# Patient Record
Sex: FEMALE | Race: WHITE | NOT HISPANIC OR LATINO | Employment: OTHER | URBAN - METROPOLITAN AREA
[De-identification: names, ages, dates, MRNs, and addresses within clinical notes are randomized per-mention and may not be internally consistent; named-entity substitution may affect disease eponyms.]

---

## 2020-01-08 ENCOUNTER — HOSPITAL ENCOUNTER (INPATIENT)
Facility: CLINIC | Age: 70
LOS: 3 days | Discharge: HOME | DRG: 438 | End: 2020-01-11
Attending: EMERGENCY MEDICINE | Admitting: INTERNAL MEDICINE
Payer: COMMERCIAL

## 2020-01-08 ENCOUNTER — APPOINTMENT (OUTPATIENT)
Dept: ULTRASOUND IMAGING | Facility: CLINIC | Age: 70
DRG: 438 | End: 2020-01-08
Attending: INTERNAL MEDICINE
Payer: COMMERCIAL

## 2020-01-08 DIAGNOSIS — F10.10 ALCOHOL ABUSE: ICD-10-CM

## 2020-01-08 DIAGNOSIS — R82.4 KETONURIA: ICD-10-CM

## 2020-01-08 DIAGNOSIS — F10.931 ALCOHOL WITHDRAWAL SYNDROME, WITH DELIRIUM (H): ICD-10-CM

## 2020-01-08 DIAGNOSIS — K85.20 ALCOHOL-INDUCED ACUTE PANCREATITIS, UNSPECIFIED COMPLICATION STATUS: ICD-10-CM

## 2020-01-08 DIAGNOSIS — E87.20 LACTIC ACIDOSIS: ICD-10-CM

## 2020-01-08 DIAGNOSIS — E87.29 HIGH ANION GAP METABOLIC ACIDOSIS: Primary | ICD-10-CM

## 2020-01-08 PROBLEM — F10.939 ALCOHOL WITHDRAWAL (H): Status: ACTIVE | Noted: 2020-01-08

## 2020-01-08 LAB
ALBUMIN SERPL-MCNC: 4.1 G/DL (ref 3.4–5)
ALBUMIN UR-MCNC: 50 MG/DL
ALP SERPL-CCNC: 90 U/L (ref 40–150)
ALT SERPL W P-5'-P-CCNC: 47 U/L (ref 0–50)
ANION GAP SERPL CALCULATED.3IONS-SCNC: 26 MMOL/L (ref 3–14)
APPEARANCE UR: CLEAR
AST SERPL W P-5'-P-CCNC: 103 U/L (ref 0–45)
BACTERIA #/AREA URNS HPF: ABNORMAL /HPF
BASOPHILS # BLD AUTO: 0.1 10E9/L (ref 0–0.2)
BASOPHILS NFR BLD AUTO: 0.8 %
BILIRUB SERPL-MCNC: 1.1 MG/DL (ref 0.2–1.3)
BILIRUB UR QL STRIP: NEGATIVE
BUN SERPL-MCNC: 9 MG/DL (ref 7–30)
CALCIUM SERPL-MCNC: 10.1 MG/DL (ref 8.5–10.1)
CHLORIDE SERPL-SCNC: 93 MMOL/L (ref 94–109)
CO2 SERPL-SCNC: 13 MMOL/L (ref 20–32)
COLOR UR AUTO: YELLOW
CREAT SERPL-MCNC: 0.63 MG/DL (ref 0.52–1.04)
DIFFERENTIAL METHOD BLD: ABNORMAL
EOSINOPHIL # BLD AUTO: 0 10E9/L (ref 0–0.7)
EOSINOPHIL NFR BLD AUTO: 0 %
ERYTHROCYTE [DISTWIDTH] IN BLOOD BY AUTOMATED COUNT: 12.6 % (ref 10–15)
ETHANOL SERPL-MCNC: <0.01 G/DL
GFR SERPL CREATININE-BSD FRML MDRD: >90 ML/MIN/{1.73_M2}
GLUCOSE SERPL-MCNC: 90 MG/DL (ref 70–99)
GLUCOSE UR STRIP-MCNC: NEGATIVE MG/DL
HCT VFR BLD AUTO: 44.2 % (ref 35–47)
HGB BLD-MCNC: 15.5 G/DL (ref 11.7–15.7)
HGB UR QL STRIP: NEGATIVE
HYALINE CASTS #/AREA URNS LPF: 4 /LPF (ref 0–2)
IMM GRANULOCYTES # BLD: 0.1 10E9/L (ref 0–0.4)
IMM GRANULOCYTES NFR BLD: 0.9 %
KETONES UR STRIP-MCNC: >150 MG/DL
LACTATE BLD-SCNC: 2.1 MMOL/L (ref 0.7–2)
LACTATE BLD-SCNC: 2.7 MMOL/L (ref 0.7–2)
LEUKOCYTE ESTERASE UR QL STRIP: NEGATIVE
LIPASE SERPL-CCNC: 1048 U/L (ref 73–393)
LYMPHOCYTES # BLD AUTO: 1 10E9/L (ref 0.8–5.3)
LYMPHOCYTES NFR BLD AUTO: 10.9 %
MAGNESIUM SERPL-MCNC: 1.7 MG/DL (ref 1.6–2.3)
MCH RBC QN AUTO: 35.7 PG (ref 26.5–33)
MCHC RBC AUTO-ENTMCNC: 35.1 G/DL (ref 31.5–36.5)
MCV RBC AUTO: 102 FL (ref 78–100)
MONOCYTES # BLD AUTO: 0.7 10E9/L (ref 0–1.3)
MONOCYTES NFR BLD AUTO: 8.1 %
MUCOUS THREADS #/AREA URNS LPF: PRESENT /LPF
NEUTROPHILS # BLD AUTO: 7.2 10E9/L (ref 1.6–8.3)
NEUTROPHILS NFR BLD AUTO: 79.3 %
NITRATE UR QL: NEGATIVE
NRBC # BLD AUTO: 0 10*3/UL
NRBC BLD AUTO-RTO: 0 /100
PH UR STRIP: 5.5 PH (ref 5–7)
PLATELET # BLD AUTO: 240 10E9/L (ref 150–450)
POTASSIUM SERPL-SCNC: 3.6 MMOL/L (ref 3.4–5.3)
PROT SERPL-MCNC: 8.2 G/DL (ref 6.8–8.8)
RBC # BLD AUTO: 4.34 10E12/L (ref 3.8–5.2)
RBC #/AREA URNS AUTO: <1 /HPF (ref 0–2)
SODIUM SERPL-SCNC: 132 MMOL/L (ref 133–144)
SOURCE: ABNORMAL
SP GR UR STRIP: 1.02 (ref 1–1.03)
SQUAMOUS #/AREA URNS AUTO: 4 /HPF (ref 0–1)
TROPONIN I SERPL-MCNC: <0.015 UG/L (ref 0–0.04)
UROBILINOGEN UR STRIP-MCNC: NORMAL MG/DL (ref 0–2)
WBC # BLD AUTO: 9.1 10E9/L (ref 4–11)
WBC #/AREA URNS AUTO: 1 /HPF (ref 0–5)

## 2020-01-08 PROCEDURE — 85025 COMPLETE CBC W/AUTO DIFF WBC: CPT | Performed by: EMERGENCY MEDICINE

## 2020-01-08 PROCEDURE — 96375 TX/PRO/DX INJ NEW DRUG ADDON: CPT

## 2020-01-08 PROCEDURE — 96361 HYDRATE IV INFUSION ADD-ON: CPT

## 2020-01-08 PROCEDURE — 25000125 ZZHC RX 250: Performed by: INTERNAL MEDICINE

## 2020-01-08 PROCEDURE — 99223 1ST HOSP IP/OBS HIGH 75: CPT | Mod: AI | Performed by: INTERNAL MEDICINE

## 2020-01-08 PROCEDURE — 76705 ECHO EXAM OF ABDOMEN: CPT

## 2020-01-08 PROCEDURE — 80320 DRUG SCREEN QUANTALCOHOLS: CPT | Performed by: EMERGENCY MEDICINE

## 2020-01-08 PROCEDURE — 83735 ASSAY OF MAGNESIUM: CPT | Performed by: EMERGENCY MEDICINE

## 2020-01-08 PROCEDURE — 96374 THER/PROPH/DIAG INJ IV PUSH: CPT

## 2020-01-08 PROCEDURE — 99285 EMERGENCY DEPT VISIT HI MDM: CPT | Mod: 25

## 2020-01-08 PROCEDURE — 25800030 ZZH RX IP 258 OP 636: Performed by: INTERNAL MEDICINE

## 2020-01-08 PROCEDURE — 25000132 ZZH RX MED GY IP 250 OP 250 PS 637: Performed by: INTERNAL MEDICINE

## 2020-01-08 PROCEDURE — 12000000 ZZH R&B MED SURG/OB

## 2020-01-08 PROCEDURE — 25800030 ZZH RX IP 258 OP 636: Performed by: EMERGENCY MEDICINE

## 2020-01-08 PROCEDURE — 25000128 H RX IP 250 OP 636: Performed by: EMERGENCY MEDICINE

## 2020-01-08 PROCEDURE — 83690 ASSAY OF LIPASE: CPT | Performed by: EMERGENCY MEDICINE

## 2020-01-08 PROCEDURE — 93005 ELECTROCARDIOGRAM TRACING: CPT

## 2020-01-08 PROCEDURE — 80053 COMPREHEN METABOLIC PANEL: CPT | Performed by: EMERGENCY MEDICINE

## 2020-01-08 PROCEDURE — 83605 ASSAY OF LACTIC ACID: CPT | Performed by: EMERGENCY MEDICINE

## 2020-01-08 PROCEDURE — C9113 INJ PANTOPRAZOLE SODIUM, VIA: HCPCS | Performed by: EMERGENCY MEDICINE

## 2020-01-08 PROCEDURE — 81001 URINALYSIS AUTO W/SCOPE: CPT | Performed by: EMERGENCY MEDICINE

## 2020-01-08 PROCEDURE — 25000128 H RX IP 250 OP 636: Performed by: INTERNAL MEDICINE

## 2020-01-08 PROCEDURE — 84484 ASSAY OF TROPONIN QUANT: CPT | Performed by: EMERGENCY MEDICINE

## 2020-01-08 RX ORDER — ACETAMINOPHEN 325 MG/1
650 TABLET ORAL EVERY 4 HOURS PRN
Status: DISCONTINUED | OUTPATIENT
Start: 2020-01-08 | End: 2020-01-11 | Stop reason: HOSPADM

## 2020-01-08 RX ORDER — ATENOLOL 50 MG/1
50 TABLET ORAL DAILY
COMMUNITY
End: 2022-12-04

## 2020-01-08 RX ORDER — ATENOLOL 50 MG/1
50 TABLET ORAL DAILY
Status: DISCONTINUED | OUTPATIENT
Start: 2020-01-08 | End: 2020-01-11 | Stop reason: HOSPADM

## 2020-01-08 RX ORDER — LORAZEPAM 2 MG/ML
1-2 INJECTION INTRAMUSCULAR EVERY 30 MIN PRN
Status: DISCONTINUED | OUTPATIENT
Start: 2020-01-08 | End: 2020-01-11 | Stop reason: HOSPADM

## 2020-01-08 RX ORDER — POLYETHYLENE GLYCOL 3350 17 G/17G
17 POWDER, FOR SOLUTION ORAL DAILY PRN
Status: DISCONTINUED | OUTPATIENT
Start: 2020-01-08 | End: 2020-01-11 | Stop reason: HOSPADM

## 2020-01-08 RX ORDER — LANOLIN ALCOHOL/MO/W.PET/CERES
100 CREAM (GRAM) TOPICAL DAILY
Status: DISCONTINUED | OUTPATIENT
Start: 2020-01-09 | End: 2020-01-11 | Stop reason: HOSPADM

## 2020-01-08 RX ORDER — LISINOPRIL 30 MG/1
30 TABLET ORAL DAILY
COMMUNITY
End: 2021-05-07

## 2020-01-08 RX ORDER — ONDANSETRON 2 MG/ML
4 INJECTION INTRAMUSCULAR; INTRAVENOUS ONCE
Status: COMPLETED | OUTPATIENT
Start: 2020-01-08 | End: 2020-01-08

## 2020-01-08 RX ORDER — FOLIC ACID 1 MG/1
1 TABLET ORAL DAILY
Status: DISCONTINUED | OUTPATIENT
Start: 2020-01-09 | End: 2020-01-11 | Stop reason: HOSPADM

## 2020-01-08 RX ORDER — LORAZEPAM 1 MG/1
1-2 TABLET ORAL EVERY 30 MIN PRN
Status: DISCONTINUED | OUTPATIENT
Start: 2020-01-08 | End: 2020-01-11 | Stop reason: HOSPADM

## 2020-01-08 RX ORDER — LIDOCAINE 40 MG/G
CREAM TOPICAL
Status: DISCONTINUED | OUTPATIENT
Start: 2020-01-08 | End: 2020-01-11 | Stop reason: HOSPADM

## 2020-01-08 RX ORDER — SODIUM CHLORIDE, SODIUM LACTATE, POTASSIUM CHLORIDE, CALCIUM CHLORIDE 600; 310; 30; 20 MG/100ML; MG/100ML; MG/100ML; MG/100ML
INJECTION, SOLUTION INTRAVENOUS CONTINUOUS
Status: DISCONTINUED | OUTPATIENT
Start: 2020-01-08 | End: 2020-01-10

## 2020-01-08 RX ORDER — ACETAMINOPHEN 325 MG/1
325-650 TABLET ORAL EVERY 8 HOURS PRN
COMMUNITY
End: 2024-04-27

## 2020-01-08 RX ORDER — MULTIPLE VITAMINS W/ MINERALS TAB 9MG-400MCG
1 TAB ORAL DAILY
Status: DISCONTINUED | OUTPATIENT
Start: 2020-01-09 | End: 2020-01-11 | Stop reason: HOSPADM

## 2020-01-08 RX ORDER — CALCIUM CARBONATE 500(1250)
1 TABLET ORAL DAILY
COMMUNITY
End: 2024-04-27

## 2020-01-08 RX ADMIN — SODIUM CHLORIDE 1000 ML: 9 INJECTION, SOLUTION INTRAVENOUS at 17:04

## 2020-01-08 RX ADMIN — LISINOPRIL 30 MG: 20 TABLET ORAL at 19:41

## 2020-01-08 RX ADMIN — SODIUM CHLORIDE 1000 ML: 9 INJECTION, SOLUTION INTRAVENOUS at 15:30

## 2020-01-08 RX ADMIN — PANTOPRAZOLE SODIUM 40 MG: 40 INJECTION, POWDER, FOR SOLUTION INTRAVENOUS at 15:58

## 2020-01-08 RX ADMIN — FOLIC ACID: 5 INJECTION, SOLUTION INTRAMUSCULAR; INTRAVENOUS; SUBCUTANEOUS at 19:46

## 2020-01-08 RX ADMIN — ONDANSETRON HYDROCHLORIDE 4 MG: 2 INJECTION, SOLUTION INTRAMUSCULAR; INTRAVENOUS at 15:30

## 2020-01-08 RX ADMIN — ATENOLOL 50 MG: 50 TABLET ORAL at 19:41

## 2020-01-08 ASSESSMENT — ENCOUNTER SYMPTOMS
ACTIVITY CHANGE: 1
VOMITING: 1
SHORTNESS OF BREATH: 0
BLOOD IN STOOL: 0
APPETITE CHANGE: 1
FEVER: 0
WEAKNESS: 1
CHILLS: 0
FATIGUE: 1
COUGH: 0
DIARRHEA: 1
ABDOMINAL PAIN: 0

## 2020-01-08 ASSESSMENT — ACTIVITIES OF DAILY LIVING (ADL): ADLS_ACUITY_SCORE: 11

## 2020-01-08 ASSESSMENT — MIFFLIN-ST. JEOR: SCORE: 1090.88

## 2020-01-08 NOTE — ED NOTES
[facility]  ED Nurse Handoff Report    Rizwana Aguilar is a 69 year old female   ED Chief complaint: Fall and Generalized Weakness  . ED Diagnosis:   Final diagnoses:   None     Allergies: No Known Allergies    Code Status: Full Code  Activity level - Baseline/Home:  Independent. Activity Level - Current:   Stand by Assist. Lift room needed: No. Bariatric: No   Needed: No   Isolation: No. Infection: Not Applicable.     Vital Signs:   Vitals:    01/08/20 1609 01/08/20 1615 01/08/20 1630 01/08/20 1645   BP:  (!) 165/95 (!) 156/105 (!) 169/117   Pulse:  116 115 144   Resp:       Temp:       TempSrc:       SpO2: 99% 100% 100% 94%   Weight:       Height:           Cardiac Rhythm:  ,   Cardiac  Cardiac Rhythm: Other (Comment)(ST with PVCs)  Pain level: 0-10 Pain Scale: 0  Patient confused: No. Patient Falls Risk: Yes.   Elimination Status: Has voided   Patient Report - Initial Complaint: Generalized weakness. Focused Assessment: Respiratory- Lung sounds diminished. Cardiac- Tele showing ST with occ. PVCs.  Neuro- A&O. GI- Nausea, vomiting and diarrhea over last few days. Bowel sounds active x 4. Musculoskeletal- Generalized weakness. MHCD- Daily drinker for 40+ years. Admits to a couple a day. Last drink yesterday. Drinks wine, daniel and vodka.   Tests Performed: Labs, EKG. Abnormal Results:   Labs Ordered and Resulted from Time of ED Arrival Up to the Time of Departure from the ED   CBC WITH PLATELETS DIFFERENTIAL - Abnormal; Notable for the following components:       Result Value     (*)     MCH 35.7 (*)     All other components within normal limits   COMPREHENSIVE METABOLIC PANEL - Abnormal; Notable for the following components:    Sodium 132 (*)     Chloride 93 (*)     Carbon Dioxide 13 (*)     Anion Gap 26 (*)      (*)     All other components within normal limits   LIPASE - Abnormal; Notable for the following components:    Lipase 1,048 (*)     All other components within normal  limits   LACTIC ACID WHOLE BLOOD - Abnormal; Notable for the following components:    Lactic Acid 2.7 (*)     All other components within normal limits   ROUTINE UA WITH MICROSCOPIC - Abnormal; Notable for the following components:    Ketones Urine >150 (*)     Protein Albumin Urine 50 (*)     Bacteria Urine Few (*)     Squamous Epithelial /HPF Urine 4 (*)     Mucous Urine Present (*)     Hyaline Casts 4 (*)     All other components within normal limits   TROPONIN I   MAGNESIUM   ALCOHOL ETHYL   LACTIC ACID   PERIPHERAL IV CATHETER   CARDIAC CONTINUOUS MONITORING   PULSE OXIMETRY NURSING   ORTHOSTATIC BLOOD PRESSURE AND PULSE   .   Treatments provided: 2 L NS, 4 mg Zofran, 40 mg protonix  Family Comments:  Blas at bedside  OBS brochure/video discussed/provided to patient:  N/A  ED Medications:   Medications   0.9% sodium chloride BOLUS (1,000 mLs Intravenous New Bag 1/8/20 1704)   0.9% sodium chloride BOLUS (0 mLs Intravenous Stopped 1/8/20 1704)   ondansetron (ZOFRAN) injection 4 mg (4 mg Intravenous Given 1/8/20 1530)   pantoprazole (PROTONIX) 40 mg IV push injection (40 mg Intravenous Given 1/8/20 1558)     Drips infusing:  Yes  For the majority of the shift, the patient's behavior Green. Interventions performed were N/A.     Severe Sepsis OR Septic Shock Diagnosis Present: No      ED Nurse Name/Phone Number: Mickie Lopez RN,   5:40 PM    RECEIVING UNIT ED HANDOFF REVIEW    Above ED Nurse Handoff Report was reviewed: Yes  Reviewed by: Olivier Echavarria RN on January 8, 2020 at 5:56 PM

## 2020-01-08 NOTE — ED PROVIDER NOTES
"  History     Chief Complaint:  Fall and Generalized Weakness    The history is provided by the patient and the spouse.      Rizwana Aguilar is a 69 year old female with a history of HTN who presents from [facility] for evaluation of generalized weakness and a fall. She has had fatigue and weakness for a least a couple months, worsening over the last 4 days. This has resulted in her sitting in her chair most of the time. She has associated yellow, non-bloody emesis with about 4-5 episodes in the last few days and mild diarrhea. Last night she was walking up the stairs and \"collapsed\" but this was witnessed by her  and she did not have loss of consciousness or head injury. He had to assist her up and into bed though she was able to get up and dressed independently today. She visited primary care who recommended ER visit. She denies injuries related to this fall and denies prodromal chest pain or shortness of breath.    The patient's  reports Rizwana complains of feeling ill for 4 years, complaining of \"the flu for a year.\" She saw a GI about 1.5 years ago and was diagnosed with a gastric ulcer. She was instructed to take omeprazole for her symptoms, but has not been complaint with this medication. She has discontinued use of NSAIDs, but continues to use alcohol daily. She admits to 2 drinks, but her  reports it is more than this. She denies abdominal pain, fever, cough, chills, or black or bloody stools. She has had anorexia, consuming only a piece of toast, a cocktail, and water yesterday.    Allergies:  NKDA     Medications:    Atenolol  Lisinopril      Past Medical History:    Gastric ulcer  UTI    Past Surgical History:    Oophorectomy    Family History:    No past pertinent family history.     Social History:  The patient was accompanied to the ED by her .  Alcohol Use: Positive, 2-3 drinks daily  Smoking Status: Negative   Marital Status:       Review of Systems " "  Constitutional: Positive for activity change, appetite change and fatigue. Negative for chills and fever.   Respiratory: Negative for cough and shortness of breath.    Cardiovascular: Negative for chest pain.   Gastrointestinal: Positive for diarrhea and vomiting. Negative for abdominal pain and blood in stool.   Neurological: Positive for weakness (generalized). Negative for syncope.   All other systems reviewed and are negative.    Physical Exam     Patient Vitals for the past 24 hrs:   BP Temp Temp src Pulse Heart Rate Resp SpO2 Height Weight   01/08/20 1929 (!) 189/118 98.5  F (36.9  C) Oral 142 -- 20 100 % -- --   01/08/20 1741 (!) 188/113 -- -- 134 144 -- 99 % -- --   01/08/20 1645 (!) 169/117 -- -- 144 149 -- 94 % -- --   01/08/20 1630 (!) 156/105 -- -- 115 116 -- 100 % -- --   01/08/20 1615 (!) 165/95 -- -- 116 105 -- 100 % -- --   01/08/20 1609 -- -- -- -- 110 -- 99 % -- --   01/08/20 1600 (!) 154/99 -- -- 101 112 -- -- -- --   01/08/20 1530 (!) 173/119 -- -- 122 131 -- 100 % -- --   01/08/20 1507 (!) 167/115 97.5  F (36.4  C) Oral -- 118 20 100 % 1.651 m (5' 5\") 56 kg (123 lb 7.3 oz)        Lying Orthostatic BP: 166/106  Pulse: 118 bpm  Sitting Orthostatic BP: 173/91  Pulse: 124 bpm  Standing Orthostatic BP: 169/117  Pulse: 140 bpm    Physical Exam  General: Well-developed and well-nourished. Well appearing elderly  woman. Cooperative.  Head:  Atraumatic.  Eyes:  Conjunctivae, lids, and sclerae are normal.  ENT:    Normal nose. Moist mucous membranes.  Neck:  Supple. Normal range of motion.  CV:  Tachycardic rate and regular rhythm. Normal heart sounds with no murmurs, rubs, or gallops detected.  Resp:  No respiratory distress. Clear to auscultation bilaterally without decreased breath sounds, wheezing, rales, or rhonchi.  GI:  Soft. Non-distended. Non-tender.    MS:  Normal ROM. No bilateral lower extremity edema.  Skin:  Warm. Non-diaphoretic. No pallor.  Neuro:  Awake. A&Ox3. Normal strength. " No tremor or tongue fasciculations.  Psych: Normal mood and affect. Normal speech.  Vitals reviewed.    Emergency Department Course   EKG  Indication: fall, weakness  Time: 1524  Rate 127 bpm. AK interval 124. QRS duration 66. QT/QTc 346/502.   Sinus tachycardia with premature supraventricular complexes  Nonspecific ST abnormality   Abnormal QRS-T angle, consider primary T wave abnormality  Abnormal ECG   No acute ST changes.  No EKG for comparison.    Laboratory:  CBC: WBC: 9.1, HGB 15.5, PLT: 240    CMP: Sodium 132 (L), Chloride 93 (L), Carbon Dioxide 13 (L), Anion Gap 26 (H),  (H), o/w WNL (Creatinine: 0.63)    1526 Troponin I: <0.015    Lipase: 1048 (H)    Magnesium: 1.7    52272 Lactic acid whole blood: 2.7 (H)    1526 Alcohol ethyl: <0.01    UA: Ketones >150, Protein Albumin 50, Bacteria Few, Squamous Epithelial 4 (H), Mucous Present, Hyalin Casts 4 (H), o/w WNL     Interventions:  1530 NS 1000 mL IV Bolus  1530 Zofran 4 mg IV  1558 Protonix 40 mg IV  1704 NS 1000 mL IV Bolus    Emergency Department Course:   Past medical records, nursing notes, and vitals reviewed.  1525: I performed an exam of the patient and obtained history, as documented above.    EKG obtained in the ED, see results above.      IV was inserted and blood was drawn for laboratory testing, results above.     The patient provided a urine sample here in the emergency department. This was sent for laboratory testing, findings above.     Medication and IV fluids administered.     1620 I rechecked and updated the patient.     1645 I spoke with Dr. Dalton, hospitalist, who agreed to admit the patient.     Findings and plan explained to the patient who consents to admission. Discussed the patient with Dr. Dalton, who will admit the patient to a Med/Surg bed for further monitoring, evaluation, and treatment.    I personally reviewed the laboratory results with the patient and answered all related questions prior to admission.     1826 I  rechecked and updated the patient. The patient reports she is doing well.     Impression & Plan    CMS Diagnoses: The Lactic acid level is elevated due to dehydration, acidosis, ketosis, at this time there is no sign of severe sepsis or septic shock. and None    Medical Decision Making:  Rizwana is a 69-year-old female who drinks daily and tells me she is felt unwell for the last 4 days with generalized weakness and vomiting.  She did have a fall yesterday which she insists was mechanical though she needed assistance to get up and get to bed.  Her  finally convinced her to see primary care who sent her to the emergency department.  She has no injuries from the fall and generally appears well.  She has no abdominal tenderness to warrant imaging of the abdomen.  EKG is reassuring without acute ST changes or arrhythmias and troponin is undetectable.  I doubt a cardiac etiology for her symptoms today and she did not have syncope yesterday.  Alcohol level is undetectable.  She does have tachycardia and hypertension though she does not have other signs of alcohol withdrawal including no diaphoresis, tremors, or tongue fasciculations and benzodiazepines are not indicated in the emergency department.  Her laboratory work-up is significant for pancreatitis with a lipase of 1,048 which is almost certainly related to alcohol.  Again, imaging of the abdomen can safely be deferred to an inpatient basis if indicated without pain or tenderness.  Electrolytes are generally reassuring though she does have evidence of anion gap acidosis with a bicarb of 13.  Concurrently she has a lactic acidosis to 2.7 and greater than 150 urine ketones.  I suspect this is related to degree of dehydration in addition to starvation ketosis and possibly a degree of alcoholic ketosis.  However, there is no evidence of infection including no leukocytosis or urinary tract infection and antibiotics are not indicated.  After 2 L of IV fluids, her  lactic acid improved to 2.1.  She was given Protonix and Zofran and had no further vomiting.  Otherwise she required no interventions while under my care but this patient does require admission for alcoholic pancreatitis with anion gap acidosis, lactic acidosis, and ketosis.  Orthostatics were negative for BP, but nearly positive for heart rate which increased 22 bpm with standing. I discussed the findings and this plan with the patient.  She is hesitant to be admitted as she prefers not to seek medical care, but ultimately acquiesced.  I answered all her questions.  I discussed patient case with Dr. Dalton, hospitalist, who accepts admission and has no further orders.    Diagnosis:    ICD-10-CM    1. High anion gap metabolic acidosis E87.2    2. Ketonuria R82.4    3. Lactic acidosis E87.2    4. Alcohol-induced acute pancreatitis, unspecified complication status K85.20    5. Alcohol abuse F10.10        Disposition:  Admitted to Med/Surg under the care of Dr. Dalton    I, Tamara Burger, am serving as a scribe on 1/8/2020 at 3:09 PM to personally document services performed by Annamaria Jack MD based on my observations and the provider's statements to me.      Tamara Burger  1/8/2020   [facility] EMERGENCY DEPARTMENT       Annamaria Jack MD  01/08/20 [id number]

## 2020-01-08 NOTE — H&P
[facility]    History and Physical - Hospitalist Service       Date of Admission:  1/8/2020     Assessment & Plan   Rizwana Aguilar is a 69 year old female with a history of possible liver cirrhosis, HTN, gastric ulcer, cancer status post lumpectomy and radiation who presented to the ED today for evaluation of weakness.    Constellation of patient's findings seem all related to alcohol use.  She has low p.o. intake, vomiting, generalized weakness.  She was found to have a metabolic acidosis with a bicarbonate of 13 related to starvation ketosis and lactic acidosis.  She has physiologic findings consistent with alcohol withdrawal with tachycardia and hypertension.  Admit inpatient status, IV fluids with LR at 150 cc/hour overnight, CIWA protocol with symptom triggered lorazepam, IV Protonix for gastritis without hematemesis.    Alcohol abuse, alcohol dependence, and alcohol withdrawal  - Patient with daily alcohol use admitted with signs of alcohol withdrawal, with tachycardia and hypertension.  - Patient denies history of complicated withdrawal but has never had a long period of sobriety.  - CIWA scale with symptom triggered lorazepam.  - Supplement thiamine and folate.  - Offered chemical dependency consult but patient declined.  thought she would benefit. Recommend re-addressing in the AM.    Alcoholic pancreatitis  - Lipase is elevated at 1000 on admission. No abdominal pain.   - Elevation in AST is related to alcohol. No sign of gall stone pancreatitis.  - As patient is pain-free on admission will allow clears tonight.    Vomiting  - Most likely related to alcohol induced gastritis.  Patient does have a history of LA grade C reflux esophagitis and nonbleeding gastric ulcers, though this was in the setting of daily NSAID use and follow-up EGD demonstrated healing. Hb 15 on admission is likely to be much lower tomorrow after IV fluids.  - Patient adamantly denies recent NSAID use.  - Continue IV  "Protonix 40 mg BID.  Don't see an indication for endoscopy at this time.    Anion gap metabolic acidosis   - Bicarbonate 13 admission with anion gap of 26.  - Secondary to starvation ketoacidosis and lactic acidosis.  Patient has ketones positive on urinalysis and history of low p.o. intake, indicating starvation ketosis  - Lactic acidosis is also elevated at 2.7, this is mostly related to vomiting and alcohol use with liver dysfunction and not representative of any hypoperfusion.  Will trend x1.  - Continuous IV fluids, recheck BMP in the AM.    Possible liver cirrhosis  - She has seen gastroenterology in the outpatient setting in 2018, note says \"likely cirrhosis.\"  Etiology most likely due to alcohol but she did have elevated ferritin, though TIBC was normal at 284, so not likely from hemochromatosis. Previous viral hepatitis testing negative. ASMA tested positive at 1:80 but on repeat was negative.   - No varices have been seen but patient noted to have changes consistent with portal hypertensive gastropathy on previous EGD.  - No known history of HE, though  reports recent decline in cognitive function.   Ascites not evident on exam.  - Will obtain RUQ to look at liver parenchyma. Don't think she needs lactulose at this time. Can likely follow up with GI in the outpatient setting.     Mild hyponatremia  - Na 132 on admission, likely hypovolemic hyponatremia with the report of nausea/vomiting.  - Repeat Na in the AM after IVF overnight.    HTN  - Hypertensive on admission. Home medications are atenolol 50 mg daily and lisinopril 30 mg. Continue the same.      Diet: Clear liquid  DVT Prophylaxis: SCDs  Billings Catheter: No  Code Status: DNR/DNI    Disposition Plan   Expected discharge: 2-3 days. Anticipate discharge to home.   Yovani Dalton MD  [facility]    ______________________________________________________________________    Chief Complaint   Weakness    History of Present " "Illness   Rizwana Aguilar is a 69 year old female with a history of possible liver cirrhosis, HTN, gastric ulcer, cancer status post lumpectomy and radiation who presented to the ED today for evaluation of weakness.    History is obtained primarily from the patient's .  He reports that the patient has been drinking heavily for years.  He states she drinks multiple strong mixed drinks, a glass of wine and daniel on a daily basis.  She has had issues with chronic nausea and flulike symptoms for years.   reports that over the last few days the patient has not had any appetite and not been able to keep any food down.  She has been vomiting a few times.  She has been feeling extremely weak and had a mechanical fall going up the stairs.  For these reasons, he brought the patient to her internal medicine clinic today, and she was subsequently referred to the ED.  In the ED, the patient was found to be hypertensive and tachycardic.  Lab work-up was notable for negative ethanol, hyponatremia at 132, elevated lipase greater than 1000, lactic acid of 2.7, bicarbonate of 13 with positive ketones on urinalysis.  She was given IV fluids, Zofran and Protonix.    On interview, the patient denies having abdominal pain.  She admits to the vomiting as above, though minimized the amount. No blood has been seen in the emesis. Denies chest pain or shortness of breath. Denies any recent NSAID use.  notes that there has also been concern about the patient's cognition as of late and plans are being made for outpatient neurocognitive evaluation.     Review of Systems    The 10 point Review of Systems is negative other than noted in the HPI or here.     Physical Exam   BP (!) 154/99   Pulse 101   Temp 97.5  F (36.4  C) (Oral)   Resp 20   Ht 1.651 m (5' 5\")   Wt 56 kg (123 lb 7.3 oz)   SpO2 99%   Breastfeeding No   BMI 20.54 kg/m         General: No distress, laying in the bed.     HEENT: No scleral icterus. Oropharynx " moist.     Neck: Supple. Normal range of motion.     Pulmonary: Normal work of breathing. Clear to auscultation bilaterally.    Cardiovascular: Regular rate and rhythm without murmur or extra heart sounds.    Abdomen: Soft and non-tender. No sign of fluid wave.     Extremities: No peripheral edema. No clubbing or cyanosis.     Neurologic: Awake, alert, appropriate. No sign of alteration in mentation.     Skin: Warm and dry. No jaundice. No spider angiomata seen. No palmar erythema.     Psychiatric: Normal affect and mood.     Data   Data reviewed today: I reviewed all medications, new labs and imaging results over the last 24 hours.     Recent Labs   Lab 01/08/20  1526   WBC 9.1   HGB 15.5   *      *   POTASSIUM 3.6   CHLORIDE 93*   CO2 13*   BUN 9   CR 0.63   ANIONGAP 26*   KHUSHI 10.1   GLC 90   ALBUMIN 4.1   PROTTOTAL 8.2   BILITOTAL 1.1   ALKPHOS 90   ALT 47   *   LIPASE 1,048*   TROPI <0.015     No results found for this or any previous visit (from the past 24 hour(s)).    Past Medical History    I have reviewed this patient's medical history and updated it with pertinent information if needed.   Past Medical History:   Diagnosis Date     Hypertension         Past Surgical History    I have reviewed this patient's surgical history and updated it with pertinent information if needed.  Past Surgical History:   Procedure Laterality Date     GYN SURGERY          Social History    I have reviewed this patient's social history and updated it with pertinent information if needed.  Daily drinker as outlined.  No tobacco or drug use.        Family History    I have reviewed this patient's family history and updated it with pertinent information if needed.   Father had Alzheimer's dementia.     Prior to Admission Medications    Prior to Admission Medications   Prescriptions Last Dose Informant Patient Reported? Taking?   ATENOLOL PO 1/7/2020 at Unknown time  Yes Yes   LISINOPRIL PO 1/7/2020 at  Unknown time  Yes Yes      Facility-Administered Medications: None        Allergies    No Known Allergies

## 2020-01-08 NOTE — ED TRIAGE NOTES
Pt sent to ED from [facility].  She has hx of ulcer and drinks alcohol daily, admits to 2-3 daily.  Her last drink was yesterday..  She is feeling generalized weakness and fell yesterday.

## 2020-01-09 LAB
ALBUMIN SERPL-MCNC: 2.8 G/DL (ref 3.4–5)
ALP SERPL-CCNC: 61 U/L (ref 40–150)
ALT SERPL W P-5'-P-CCNC: 28 U/L (ref 0–50)
ANION GAP SERPL CALCULATED.3IONS-SCNC: 20 MMOL/L (ref 3–14)
AST SERPL W P-5'-P-CCNC: 60 U/L (ref 0–45)
BILIRUB SERPL-MCNC: 1 MG/DL (ref 0.2–1.3)
BUN SERPL-MCNC: 8 MG/DL (ref 7–30)
CALCIUM SERPL-MCNC: 8.4 MG/DL (ref 8.5–10.1)
CHLORIDE SERPL-SCNC: 108 MMOL/L (ref 94–109)
CO2 SERPL-SCNC: 10 MMOL/L (ref 20–32)
CREAT SERPL-MCNC: 0.58 MG/DL (ref 0.52–1.04)
ERYTHROCYTE [DISTWIDTH] IN BLOOD BY AUTOMATED COUNT: 13 % (ref 10–15)
GFR SERPL CREATININE-BSD FRML MDRD: >90 ML/MIN/{1.73_M2}
GLUCOSE SERPL-MCNC: 81 MG/DL (ref 70–99)
HCT VFR BLD AUTO: 35.1 % (ref 35–47)
HGB BLD-MCNC: 11.9 G/DL (ref 11.7–15.7)
INTERPRETATION ECG - MUSE: NORMAL
MAGNESIUM SERPL-MCNC: 1.5 MG/DL (ref 1.6–2.3)
MAGNESIUM SERPL-MCNC: 3 MG/DL (ref 1.6–2.3)
MCH RBC QN AUTO: 34.5 PG (ref 26.5–33)
MCHC RBC AUTO-ENTMCNC: 33.9 G/DL (ref 31.5–36.5)
MCV RBC AUTO: 102 FL (ref 78–100)
PHOSPHATE SERPL-MCNC: 1.5 MG/DL (ref 2.5–4.5)
PHOSPHATE SERPL-MCNC: 1.7 MG/DL (ref 2.5–4.5)
PLATELET # BLD AUTO: 175 10E9/L (ref 150–450)
POTASSIUM SERPL-SCNC: 3.2 MMOL/L (ref 3.4–5.3)
POTASSIUM SERPL-SCNC: 3.4 MMOL/L (ref 3.4–5.3)
PROT SERPL-MCNC: 5.6 G/DL (ref 6.8–8.8)
RBC # BLD AUTO: 3.45 10E12/L (ref 3.8–5.2)
SODIUM SERPL-SCNC: 138 MMOL/L (ref 133–144)
WBC # BLD AUTO: 7.4 10E9/L (ref 4–11)

## 2020-01-09 PROCEDURE — 36415 COLL VENOUS BLD VENIPUNCTURE: CPT | Performed by: INTERNAL MEDICINE

## 2020-01-09 PROCEDURE — 12000000 ZZH R&B MED SURG/OB

## 2020-01-09 PROCEDURE — 25000128 H RX IP 250 OP 636: Performed by: INTERNAL MEDICINE

## 2020-01-09 PROCEDURE — 25800030 ZZH RX IP 258 OP 636: Performed by: INTERNAL MEDICINE

## 2020-01-09 PROCEDURE — 83735 ASSAY OF MAGNESIUM: CPT | Performed by: INTERNAL MEDICINE

## 2020-01-09 PROCEDURE — 84132 ASSAY OF SERUM POTASSIUM: CPT | Performed by: INTERNAL MEDICINE

## 2020-01-09 PROCEDURE — 85027 COMPLETE CBC AUTOMATED: CPT | Performed by: INTERNAL MEDICINE

## 2020-01-09 PROCEDURE — C9113 INJ PANTOPRAZOLE SODIUM, VIA: HCPCS | Performed by: INTERNAL MEDICINE

## 2020-01-09 PROCEDURE — 84100 ASSAY OF PHOSPHORUS: CPT | Performed by: INTERNAL MEDICINE

## 2020-01-09 PROCEDURE — 25000125 ZZHC RX 250: Performed by: INTERNAL MEDICINE

## 2020-01-09 PROCEDURE — 99232 SBSQ HOSP IP/OBS MODERATE 35: CPT | Performed by: INTERNAL MEDICINE

## 2020-01-09 PROCEDURE — 80053 COMPREHEN METABOLIC PANEL: CPT | Performed by: INTERNAL MEDICINE

## 2020-01-09 PROCEDURE — 25000132 ZZH RX MED GY IP 250 OP 250 PS 637: Performed by: INTERNAL MEDICINE

## 2020-01-09 RX ORDER — ONDANSETRON 2 MG/ML
4 INJECTION INTRAMUSCULAR; INTRAVENOUS EVERY 6 HOURS PRN
Status: DISCONTINUED | OUTPATIENT
Start: 2020-01-09 | End: 2020-01-11 | Stop reason: HOSPADM

## 2020-01-09 RX ORDER — POTASSIUM CHLORIDE 1.5 G/1.58G
20-40 POWDER, FOR SOLUTION ORAL
Status: DISCONTINUED | OUTPATIENT
Start: 2020-01-09 | End: 2020-01-11 | Stop reason: HOSPADM

## 2020-01-09 RX ORDER — POTASSIUM CL/LIDO/0.9 % NACL 10MEQ/0.1L
10 INTRAVENOUS SOLUTION, PIGGYBACK (ML) INTRAVENOUS
Status: DISCONTINUED | OUTPATIENT
Start: 2020-01-09 | End: 2020-01-11 | Stop reason: HOSPADM

## 2020-01-09 RX ORDER — POTASSIUM CHLORIDE 1500 MG/1
20-40 TABLET, EXTENDED RELEASE ORAL
Status: DISCONTINUED | OUTPATIENT
Start: 2020-01-09 | End: 2020-01-11 | Stop reason: HOSPADM

## 2020-01-09 RX ORDER — PROCHLORPERAZINE MALEATE 5 MG
5 TABLET ORAL EVERY 6 HOURS PRN
Status: DISCONTINUED | OUTPATIENT
Start: 2020-01-09 | End: 2020-01-11 | Stop reason: HOSPADM

## 2020-01-09 RX ORDER — MAGNESIUM SULFATE HEPTAHYDRATE 40 MG/ML
4 INJECTION, SOLUTION INTRAVENOUS EVERY 4 HOURS PRN
Status: DISCONTINUED | OUTPATIENT
Start: 2020-01-09 | End: 2020-01-11 | Stop reason: HOSPADM

## 2020-01-09 RX ORDER — POTASSIUM CHLORIDE 29.8 MG/ML
20 INJECTION INTRAVENOUS
Status: DISCONTINUED | OUTPATIENT
Start: 2020-01-09 | End: 2020-01-11 | Stop reason: HOSPADM

## 2020-01-09 RX ORDER — ONDANSETRON 4 MG/1
4 TABLET, ORALLY DISINTEGRATING ORAL EVERY 6 HOURS PRN
Status: DISCONTINUED | OUTPATIENT
Start: 2020-01-09 | End: 2020-01-11 | Stop reason: HOSPADM

## 2020-01-09 RX ORDER — PROCHLORPERAZINE 25 MG
12.5 SUPPOSITORY, RECTAL RECTAL EVERY 12 HOURS PRN
Status: DISCONTINUED | OUTPATIENT
Start: 2020-01-09 | End: 2020-01-11 | Stop reason: HOSPADM

## 2020-01-09 RX ORDER — POTASSIUM CHLORIDE 7.45 MG/ML
10 INJECTION INTRAVENOUS
Status: DISCONTINUED | OUTPATIENT
Start: 2020-01-09 | End: 2020-01-11 | Stop reason: HOSPADM

## 2020-01-09 RX ADMIN — Medication 10 MG: at 00:09

## 2020-01-09 RX ADMIN — SODIUM CHLORIDE, POTASSIUM CHLORIDE, SODIUM LACTATE AND CALCIUM CHLORIDE: 600; 310; 30; 20 INJECTION, SOLUTION INTRAVENOUS at 19:44

## 2020-01-09 RX ADMIN — MAGNESIUM SULFATE HEPTAHYDRATE 4 G: 40 INJECTION, SOLUTION INTRAVENOUS at 11:51

## 2020-01-09 RX ADMIN — POTASSIUM CHLORIDE 40 MEQ: 1.5 POWDER, FOR SOLUTION ORAL at 10:54

## 2020-01-09 RX ADMIN — LISINOPRIL 30 MG: 20 TABLET ORAL at 09:23

## 2020-01-09 RX ADMIN — POTASSIUM PHOSPHATE, MONOBASIC AND POTASSIUM PHOSPHATE, DIBASIC 20 MMOL: 224; 236 INJECTION, SOLUTION INTRAVENOUS at 14:00

## 2020-01-09 RX ADMIN — SODIUM CHLORIDE, POTASSIUM CHLORIDE, SODIUM LACTATE AND CALCIUM CHLORIDE: 600; 310; 30; 20 INJECTION, SOLUTION INTRAVENOUS at 06:01

## 2020-01-09 RX ADMIN — Medication 100 MG: at 09:21

## 2020-01-09 RX ADMIN — PANTOPRAZOLE SODIUM 40 MG: 40 INJECTION, POWDER, FOR SOLUTION INTRAVENOUS at 10:32

## 2020-01-09 RX ADMIN — ONDANSETRON 4 MG: 4 TABLET, ORALLY DISINTEGRATING ORAL at 14:25

## 2020-01-09 RX ADMIN — ENOXAPARIN SODIUM 40 MG: 40 INJECTION SUBCUTANEOUS at 09:24

## 2020-01-09 RX ADMIN — FOLIC ACID 1 MG: 1 TABLET ORAL at 09:22

## 2020-01-09 RX ADMIN — MULTIPLE VITAMINS W/ MINERALS TAB 1 TABLET: TAB at 09:22

## 2020-01-09 RX ADMIN — ATENOLOL 50 MG: 50 TABLET ORAL at 09:22

## 2020-01-09 RX ADMIN — Medication 10 MG: at 19:44

## 2020-01-09 ASSESSMENT — ACTIVITIES OF DAILY LIVING (ADL)
ADLS_ACUITY_SCORE: 14
ADLS_ACUITY_SCORE: 15
ADLS_ACUITY_SCORE: 15
ADLS_ACUITY_SCORE: 14
ADLS_ACUITY_SCORE: 15
ADLS_ACUITY_SCORE: 15

## 2020-01-09 NOTE — PLAN OF CARE
VSS. Afebrile. LS diminished. BS x4. Pt denies pain and nausea. Clear liquid diet, tolerating well. SBA to ambulate. PIV - LR @ 150 ml/hr. CIWA score of 3 and 1. A&O but forgetful; Alarm on bed for safety. Tele monitoring. Slept well. Discharge TBD.

## 2020-01-09 NOTE — PLAN OF CARE
DNR/DNI.  Tolerating clear liquids.  Up with stand by assist.  VSS on room air. Afebrile.  Banana bag @ 100.  Tele: SR w/ PACs.  Abd US completed. Results in chart.  BM yesterday.  CIWA: 3.  Alert and oriented, forgetful.  Disposition pending.

## 2020-01-09 NOTE — CONSULTS
"CLINICAL NUTRITION SERVICES  -  ASSESSMENT NOTE      Future/Additional Recommendations: High protein supplements    Malnutrition: % Weight Loss:  > 7.5% in 3 months (severe malnutrition)  % Intake:  </= 50% for >/= 5 days (severe malnutrition)  Subcutaneous Fat Loss:  Orbital region moderate depletion and Upper arm region severe depletion  Muscle Loss:  Temporal region mild to moderate depletion, Clavicle bone region moderate depletion, Acromion bone region mostly rouded, Dorsal hand region moderate depletion, Patellar region moderate depletion, Anterior thigh region moderate to severe depletion and Posterior calf region moderate to severe depletion with sagging skin.   Fluid Retention:  None noted    Malnutrition Diagnosis: Severe malnutrition  In Context of:  Acute illness or injury and social and environmental factors      REASON FOR ASSESSMENT  Rizwana Aguilar is a 69 year old female seen by Registered Dietitian for Provider Order - Malnutrition   Hiistory of possible liver cirrhosis, HTN, gastric ulcer, cancer status post lumpectomy and radiation    NUTRITION HISTORY  Information obtained from patient. Questionable accuracy of diet history with conflicting information reported.   Pt states that nutrition at home is \"fantastic\" and her  is the primary cook. She reports that she eats three meals a day with no additional snacks. She has no previous diet restrictions and no food allergies. General meals include toast with peanut butter for breakfast, 1/2 sandwich (BLT) and soup for lunch, and meat and a salad for dinner. Pt reports that she has had no intake for 4 days but hasn't been feeling well for week. Pt reports poor appetite and vomiting being a barrier to eating. She has been consuming ensure 1Xday for the last 3 months. Consumes only water throughout the day and did not discuss ETOH consumption.     CURRENT NUTRITION ORDERS  Diet Order:     Clear Liquid, ADAT    Current Intake/Tolerance:  Pt is only " "tolerating clear liquids at the moment. States her stomach hurts when she eats.    NUTRITION FOCUSED PHYSICAL ASSESSMENT FOR DIAGNOSING MALNUTRITION)  Yes  Observed:    Muscle wasting (refer to documentation in Malnutrition section) and Subcutaneous fat loss (refer to documentation in Malnutrition section)    Obtained from Chart/Interdisciplinary Team:  Generalized weakness, assist, CIWA, and confused.     ANTHROPOMETRICS  Height: 5' 5\"  Weight: 123 lbs 7.32 oz  Body mass index is 20.54 kg/m .  Weight Status:  Normal BMI  IBW: 125 lbs   % IBW: 98%  Weight History: No recent weight history to review. Per pt usual body weight 140 lbs (63.6 kg). 12% loss in 3 months.     LABS  Labs reviewed  Lipase 1,048 (H) pancreatitis   Potassium 3.2 (L) vomiting   Phosphorus 1.5 (L)  Magnesium 1.5 (L)  Positive urinary ketones   Urinary function WNL     MEDICATIONS  Medications reviewed  Folic Acid 1 mg  Thiamine 100 mg   MTV/M  IV fluids @150 mL/hr    ASSESSED NUTRITION NEEDS PER APPROVED PRACTICE GUIDELINES:    Dosing Weight 56 kg   Estimated Energy Needs: 7952-6226 kcals (25-30+ Kcal/Kg) 1096 MSJ X 1.3 (1425)  Justification: maintenance  Estimated Protein Needs: 56-67 grams protein (1-1.2+ g pro/Kg)   Justification: maintenance  Estimated Fluid Needs: >/=1 mL/Kcal  Justification: maintenance    MALNUTRITION:  % Weight Loss:  > 7.5% in 3 months (severe malnutrition)  % Intake:  </= 50% for >/= 5 days (severe malnutrition)  Subcutaneous Fat Loss: Orbital region moderate depletion and Upper arm region severe depletion  Muscle Loss:  Temporal region mild to moderate depletion, Clavicle bone region moderate depletion, Acromion bone region mostly rounded, Dorsal hand region moderate depletion, Patellar region moderate depletion, Anterior thigh region moderate to severe depletion and Posterior calf region moderate to severe depletion with sagging skin.   Fluid Retention:  None noted    Malnutrition Diagnosis: Severe malnutrition  In " Context of:  Acute illness or injury and social and environmental factors     NUTRITION DIAGNOSIS:  Inadequate energy intake related to excessive alcohol use, pancreatitis, vomiting and abdominal pain with eating as evidenced by >7.5% weight loss in 3 months, fat and muscle loss, and consuming only 50% of needs for 1 week PTA.     NUTRITION INTERVENTIONS  Recommendations / Nutrition Prescription  High protein shakes once diet advances. Diet advancement per MD. Continue micronutrients as ordered. Electrolyte replacement per MD.   .  Implementation  Nutrition education: Provided education on protein sources including boost. Also introduced the idea of a low salt diet and pt declined stating she consumes a low salt diet at home.   Medical Food Supplement- boost at 10 and 2 with diet advancement. Collaboration and Referral of Nutrition care- discussed during IDT rounds and discussed with RN.   .  Nutrition Goals  Patient to consume boost shakes TID.    Diet advancement and tolerance >/= full liquids within 48 hours  .  MONITORING AND EVALUATION:  Progress towards goals will be monitored and evaluated per protocol and Practice Guidelines    Alaina Velez  Dietetic Intern

## 2020-01-09 NOTE — PLAN OF CARE
Intermittent nausea and received zofran x1, CIWA scores 0/2, denies pain, up with Ax1 and holds on to IV pole, unsteady, started on clear liquids and appetite is poor, electrolytes all needing to be replaced today per protocol, had to slow potassium phosphate infusion d/t vein irritation but site has good blood return and no swelling or redness, declined and treatment and is not planning on quitting drinking per MD, nutrition following

## 2020-01-09 NOTE — PHARMACY-ADMISSION MEDICATION HISTORY
Admission medication history interview status for this patient is complete. See [facility] admission navigator for allergy information, prior to admission medications and immunization status.     Medication history interview source(s):Patient  Medication history resources (including written lists, pill bottles, clinic record): SureScripts and Care Everywhere  Primary pharmacy: [address], [place] [address]    Changes made to PTA medication list:  Added: acetaminophen and calcim  Deleted: none  Changed: added dosage regimen for lisinopril and atenolol    Actions taken by pharmacist (provider contacted, etc):None     Additional medication history information:None    Medication reconciliation/reorder completed by provider prior to medication history?  No     Do you take OTC medications (eg tylenol, ibuprofen, fish oil, eye/ear drops, etc)? Yes - see list.     Prior to Admission medications    Medication Sig Last Dose Taking? Auth Provider   acetaminophen (TYLENOL) 325 MG tablet Take 325-650 mg by mouth every 8 hours as needed for mild pain Past Month at Unknown time Yes Unknown, Entered By History   atenolol (TENORMIN) 50 MG tablet Take 50 mg by mouth daily  1/7/2020 at am Yes Reported, Patient   calcium carbonate (OS-KHUSHI) 500 MG tablet Take 1 tablet by mouth daily Past Week at Unknown time Yes Unknown, Entered By History   lisinopril (PRINIVIL/ZESTRIL) 30 MG tablet Take 30 mg by mouth daily  1/7/2020 at am Yes Reported, Patient

## 2020-01-09 NOTE — PROGRESS NOTES
"[facility]  Hospitalist Progress Note  Scarlet Damico MD 01/09/20  Text Page  Pager: [phone] (7am-6pm)    Reason for Stay (Diagnosis): weakness, alcohol dependence, pancreatitis         Assessment and Plan:      Summary of Stay: Rizwana Aguilar is a 69 year old female with past medical history of possible cirrhosis, hypertension, prior gastric ulcer (in the setting of significant NSAID use), breast cancer status post lumpectomy and radiation who was admitted on 1/8/2020 with diffuse weakness at the urging of her  to come to the hospital with associated poor oral intake and vomiting.  Patient was found to have anion gap metabolic acidosis and likely pancreatitis.    Problem List/Assessment and Plan:     Alcohol abuse, alcohol dependence, at risk for alcohol withdrawal: Patient drinks alcohol daily.  She minimizes the amount of alcohol she drinks and does not feel that she has a problem with alcohol.  Discussed the importance of alcohol cessation but patient reports that this is \"part of my routine\" and she is not interested in quitting.  Declines chemical dependency consultation.  At this time monitoring for withdrawal but she has not required treatment.  -CIWA protocol with vitamins  -Offered CD consult but patient declines this     Alcoholic pancreatitis: Presented with poor oral intake, nausea, vomiting for several days prior to admission.  She had no abdominal pain on admission.  She was found to have a lipase of 1000 as well as mild elevation of AST.  As above she drinks on a daily basis.  This morning she has mild mid abdominal pain and nausea but no emesis so far.  She has tolerated ice chips so far today but has no appetite.  This is certainly a mild case of pancreatitis but will monitor her closely as diet is advanced.  -Clear liquid diet, advance as tolerated  -Antiemetics as needed     Vomiting: This could be due to pancreatitis as above.  She also has history of LA grade C reflux " "esophagitis and non bleeding gastric ulcers, though this was in the setting of daily NSAID use and follow-up EGD demonstrated healing.  Certainly could have alcoholic gastritis.  Patient adamantly denies recent NSAID use.  -PPI  -PRN antiemetics     Anion gap metabolic acidosis: Presented with a bicarbonate of 13 and an anion gap of 26.  Patient also has ketones positive on UA and a history of poor oral intake for several days as well as significant alcohol use.  Her lactate was elevated to 2.7 on admission and improved to 2.1 so far with fluids.  This is likely a combination of starvation ketoacidosis, lactic acidosis as well as related to her significant alcohol use.  Her bicarbonate remains low at 10 this morning but anion gap is improved slightly to 20.  She still has not eaten and will continue IV fluids and try to advance diet.  -Continue LR at 150 cc/h  -BMP in the morning     Possible liver cirrhosis: She has seen gastroenterology in the outpatient setting in 2018, note says \"likely cirrhosis.\"  Etiology most likely due to alcohol but she did have elevated ferritin, though TIBC was normal at 284, so not likely from hemochromatosis. Previous viral hepatitis testing negative. ASMA tested positive at 1:80 but on repeat was negative. No varices have been seen but patient noted to have changes consistent with portal hypertensive gastropathy on previous EGD. No known history of HE, though  reports recent decline in cognitive function.  Right upper quadrant ultrasound showed no ascites.  -Follow-up in the outpatient setting with GI  -Certainly needs alcohol cessation    Mild hyponatremia -resolved: Na 132 on admission, hypovolemic hyponatremia with the report of nausea/vomiting as well as poor oral intake.  Sodium normalized with hydration.     HTN: Hypertensive on admission. Home medications are atenolol 50 mg daily and lisinopril 30 mg. Continue the same.    Diffuse weakness: Due to combination of " "metabolic abnormalities, poor oral intake and alcohol use.  Treatment as above.  PT has been consulted.    Malnutrition: Nutrition consulted.    Mild cognitive impairment: Patient seems to have very poor insight into her medical disease.  She has a hard time giving me an accurate history.  The patient's  has noticed that her cognition has seemed to be declining over the past several months and reportedly has plans to have neuropsych testing in the outpatient setting.     Diet: Advance Diet as Tolerated: Clear Liquid Diet    DVT Prophylaxis: Enoxaparin (Lovenox) SQ  Billings Catheter: not present  Code Status: DNR/DNI      Disposition Plan   Expected discharge: 1-3 days, recommended to TBD based on therapy recommendations once safe disposition plan/ TCU bed available and improved lab results, tolerating diet.  Entered: Scarlet Damico MD 01/09/2020, 8:55 AM       The patient's care was discussed with the Bedside Nurse, Patient and Patient's Family.    Hospitalist Service  [facility]          Interval History (Subjective):      Patient reports she feels absolutely fine today.  Notes she would not have come to the hospital if her  didn't make her.  She stated there was nothing wrong but with specific questioning she has had nausea for the past 3 days with some emesis.  She does have some mid abdominal pain which is mild.  States she does not want to try anything other than water.  Reports she has not eaten in 3 days.  She drinks a few drinks and a glass of wine daily but states this is not very much.  Declines meeting with CD.  States she sees no reason to quit drinking as it is \"part of my routine\".  She states she has cut back from what she used to drink.    I left a message with her  this morning.                    Physical Exam:      Last Vital Signs:  BP (P) 115/66 (BP Location: Right arm)   Pulse 142   Temp (P) 98.3  F (36.8  C) (Oral)   Resp (P) 20   Ht 1.651 m (5' 5\")   Wt " 56 kg (123 lb 7.3 oz)   SpO2 (P) 100%   Breastfeeding No   BMI 20.54 kg/m      General: Alert, awake, no acute distress.  HEENT: Normocephalic and atraumatic, eyes anicteric and without scleral injection, EOMI, face symmetric, MMM.  Cardiac: RRR, normal S1, S2. No m/g/r, no LE edema.  Pulmonary: Normal chest rise, normal work of breathing.  Lungs CTAB without crackles or wheezing.  Abdomen: soft, non-tender, non-distended.  Normoactive bowel sounds, no guarding or rebound tenderness.  Extremities: no deformities.  Warm, well perfused.  Skin: no rashes or lesions.  Warm and Dry.  Neuro: No focal deficits.  Speech clear.  Coordination and strength grossly normal.  Psych: Alert and oriented x3, very poor insight into her disease.  Cannot give accurate description of what was occurring PTA. Appropriate affect.         Medications:      All current medications were reviewed with changes reflected in problem list.         Data:      All new lab and imaging data was reviewed.   Labs:  Recent Labs   Lab 01/08/20  1526   WBC 9.1   HGB 15.5   HCT 44.2   *        Recent Labs   Lab 01/09/20  0729 01/08/20  1526    132*   POTASSIUM 3.2* 3.6   CHLORIDE 108 93*   CO2 10* 13*   ANIONGAP 20* 26*   GLC 81 90   BUN 8 9   CR 0.58 0.63   GFRESTIMATED >90 >90   GFRESTBLACK >90 >90   KHUSHI 8.4* 10.1   MAG  --  1.7   PROTTOTAL 5.6* 8.2   ALBUMIN 2.8* 4.1   BILITOTAL 1.0 1.1   ALKPHOS 61 90   AST 60* 103*   ALT 28 47     Recent Labs   Lab 01/08/20  1526   LIPASE 1,048*      Imaging:   Recent Results (from the past 24 hour(s))   US Abdomen Limited    Narrative    US ABDOMEN LIMITED   1/8/2020 7:26 PM     HISTORY: Evaluate for cirrhosis.    COMPARISON: None.    FINDINGS:    Gallbladder: Normal with no cholelithiasis, wall thickening or focal  tenderness.      Bile ducts: CHD is normal diameter. No intrahepatic biliary  dilatation.    Liver: Liver length is 14.7 cm. Increased echogenicity of the liver  compared to the  right kidney.    Pancreas: Partially obscured by overlying bowel gas,  but grossly  unremarkable.     Right kidney: Normal.     Aorta and IVC: Not specifically assessed.       Impression    IMPRESSION: Increased echogenicity of the liver compared to the right  kidney could relate to fatty infiltration or underlying liver disease.  No acute abnormality is seen.    MD Scarlet BENSON MD.

## 2020-01-10 ENCOUNTER — APPOINTMENT (OUTPATIENT)
Dept: PHYSICAL THERAPY | Facility: CLINIC | Age: 70
DRG: 438 | End: 2020-01-10
Attending: INTERNAL MEDICINE
Payer: COMMERCIAL

## 2020-01-10 LAB
ALBUMIN SERPL-MCNC: 2.4 G/DL (ref 3.4–5)
ALP SERPL-CCNC: 58 U/L (ref 40–150)
ALT SERPL W P-5'-P-CCNC: 23 U/L (ref 0–50)
ANION GAP SERPL CALCULATED.3IONS-SCNC: 11 MMOL/L (ref 3–14)
AST SERPL W P-5'-P-CCNC: 43 U/L (ref 0–45)
BILIRUB SERPL-MCNC: 0.6 MG/DL (ref 0.2–1.3)
BUN SERPL-MCNC: 3 MG/DL (ref 7–30)
CALCIUM SERPL-MCNC: 8.9 MG/DL (ref 8.5–10.1)
CHLORIDE SERPL-SCNC: 106 MMOL/L (ref 94–109)
CO2 SERPL-SCNC: 20 MMOL/L (ref 20–32)
CREAT SERPL-MCNC: 0.42 MG/DL (ref 0.52–1.04)
ERYTHROCYTE [DISTWIDTH] IN BLOOD BY AUTOMATED COUNT: 12.9 % (ref 10–15)
GFR SERPL CREATININE-BSD FRML MDRD: >90 ML/MIN/{1.73_M2}
GLUCOSE SERPL-MCNC: 92 MG/DL (ref 70–99)
HCT VFR BLD AUTO: 34.5 % (ref 35–47)
HGB BLD-MCNC: 12.2 G/DL (ref 11.7–15.7)
LACTATE BLD-SCNC: 0.9 MMOL/L (ref 0.7–2)
MAGNESIUM SERPL-MCNC: 2 MG/DL (ref 1.6–2.3)
MCH RBC QN AUTO: 34.9 PG (ref 26.5–33)
MCHC RBC AUTO-ENTMCNC: 35.4 G/DL (ref 31.5–36.5)
MCV RBC AUTO: 99 FL (ref 78–100)
PHOSPHATE SERPL-MCNC: 1.3 MG/DL (ref 2.5–4.5)
PHOSPHATE SERPL-MCNC: 1.9 MG/DL (ref 2.5–4.5)
PLATELET # BLD AUTO: 162 10E9/L (ref 150–450)
POTASSIUM SERPL-SCNC: 3.2 MMOL/L (ref 3.4–5.3)
POTASSIUM SERPL-SCNC: 3.5 MMOL/L (ref 3.4–5.3)
POTASSIUM SERPL-SCNC: 3.8 MMOL/L (ref 3.4–5.3)
PROT SERPL-MCNC: 5.1 G/DL (ref 6.8–8.8)
RBC # BLD AUTO: 3.5 10E12/L (ref 3.8–5.2)
SODIUM SERPL-SCNC: 137 MMOL/L (ref 133–144)
WBC # BLD AUTO: 6.3 10E9/L (ref 4–11)

## 2020-01-10 PROCEDURE — 25800030 ZZH RX IP 258 OP 636: Performed by: INTERNAL MEDICINE

## 2020-01-10 PROCEDURE — 25000128 H RX IP 250 OP 636: Performed by: INTERNAL MEDICINE

## 2020-01-10 PROCEDURE — 99233 SBSQ HOSP IP/OBS HIGH 50: CPT | Performed by: INTERNAL MEDICINE

## 2020-01-10 PROCEDURE — 84100 ASSAY OF PHOSPHORUS: CPT | Performed by: INTERNAL MEDICINE

## 2020-01-10 PROCEDURE — 25000125 ZZHC RX 250: Performed by: INTERNAL MEDICINE

## 2020-01-10 PROCEDURE — 97116 GAIT TRAINING THERAPY: CPT | Mod: GP

## 2020-01-10 PROCEDURE — 25000132 ZZH RX MED GY IP 250 OP 250 PS 637: Performed by: INTERNAL MEDICINE

## 2020-01-10 PROCEDURE — 97530 THERAPEUTIC ACTIVITIES: CPT | Mod: GP

## 2020-01-10 PROCEDURE — 84132 ASSAY OF SERUM POTASSIUM: CPT | Performed by: INTERNAL MEDICINE

## 2020-01-10 PROCEDURE — 83605 ASSAY OF LACTIC ACID: CPT | Performed by: INTERNAL MEDICINE

## 2020-01-10 PROCEDURE — 85027 COMPLETE CBC AUTOMATED: CPT | Performed by: INTERNAL MEDICINE

## 2020-01-10 PROCEDURE — 80053 COMPREHEN METABOLIC PANEL: CPT | Performed by: INTERNAL MEDICINE

## 2020-01-10 PROCEDURE — 36415 COLL VENOUS BLD VENIPUNCTURE: CPT | Performed by: INTERNAL MEDICINE

## 2020-01-10 PROCEDURE — 12000000 ZZH R&B MED SURG/OB

## 2020-01-10 PROCEDURE — 97161 PT EVAL LOW COMPLEX 20 MIN: CPT | Mod: GP

## 2020-01-10 PROCEDURE — 83735 ASSAY OF MAGNESIUM: CPT | Performed by: INTERNAL MEDICINE

## 2020-01-10 RX ADMIN — POTASSIUM CHLORIDE 40 MEQ: 1.5 POWDER, FOR SOLUTION ORAL at 09:15

## 2020-01-10 RX ADMIN — LISINOPRIL 30 MG: 20 TABLET ORAL at 09:17

## 2020-01-10 RX ADMIN — MULTIPLE VITAMINS W/ MINERALS TAB 1 TABLET: TAB at 09:18

## 2020-01-10 RX ADMIN — POTASSIUM PHOSPHATE, MONOBASIC AND POTASSIUM PHOSPHATE, DIBASIC 20 MMOL: 224; 236 INJECTION, SOLUTION INTRAVENOUS at 10:15

## 2020-01-10 RX ADMIN — OMEPRAZOLE 40 MG: 20 CAPSULE, DELAYED RELEASE ORAL at 07:02

## 2020-01-10 RX ADMIN — POTASSIUM PHOSPHATE, MONOBASIC AND POTASSIUM PHOSPHATE, DIBASIC 20 MMOL: 224; 236 INJECTION, SOLUTION INTRAVENOUS at 20:14

## 2020-01-10 RX ADMIN — SODIUM CHLORIDE, POTASSIUM CHLORIDE, SODIUM LACTATE AND CALCIUM CHLORIDE: 600; 310; 30; 20 INJECTION, SOLUTION INTRAVENOUS at 01:56

## 2020-01-10 RX ADMIN — Medication 10 MG: at 20:38

## 2020-01-10 RX ADMIN — ENOXAPARIN SODIUM 40 MG: 40 INJECTION SUBCUTANEOUS at 09:17

## 2020-01-10 RX ADMIN — SODIUM CHLORIDE, POTASSIUM CHLORIDE, SODIUM LACTATE AND CALCIUM CHLORIDE: 600; 310; 30; 20 INJECTION, SOLUTION INTRAVENOUS at 09:02

## 2020-01-10 RX ADMIN — FOLIC ACID 1 MG: 1 TABLET ORAL at 09:18

## 2020-01-10 RX ADMIN — Medication 100 MG: at 09:18

## 2020-01-10 RX ADMIN — ATENOLOL 50 MG: 50 TABLET ORAL at 09:17

## 2020-01-10 ASSESSMENT — ACTIVITIES OF DAILY LIVING (ADL)
ADLS_ACUITY_SCORE: 18
ADLS_ACUITY_SCORE: 15
ADLS_ACUITY_SCORE: 15
ADLS_ACUITY_SCORE: 14
ADLS_ACUITY_SCORE: 15
ADLS_ACUITY_SCORE: 14

## 2020-01-10 NOTE — PLAN OF CARE
"  PT: Patient seen by physical therapy for evaluation and treatment.  Patient with PMH including cirrhosis, HTN, prior gastric ulcer, breast cancer s/p lumpectomy and radiation now admitted with diffuse weakness, poor oral intake and vomiting with gap metabolic acidosis and likely pancreatitis.  Patient lives with her spouse in multi level house (6 steps to enter, 2 steps to living room, 1 step to kitchen, 1 step to dining room).  Spouse is retired and home during the day.  Patient does not use an assistive device at baseline.  Patient and spouse report significant decrease in balance and ambulation in the week or two leading up to admission.  Patient reports falling x2 (\"caught toe on step\" and \"legs just gave out on me\").  Spouse reports that he is unable to assist her to standing, patient crawled to bed and was able to pull herself onto bed with spouse providing significant assistance.      Discharge Planner PT   Patient plan for discharge: Patient reports that she wants to leave for home as soon as possible.  Current status: Patient supine upon arrival.  Patient is independent with bed mobility.  Patient transferred to standing with CGA.  Patient amb 100 feet without assistive device with CGA.  Patient with slow gait speed, wide base of support, intermittently reaching out for railings/walls.  Patient experienced loss of balance with head turns while walking (able to self correct) and declined retro walking \"I can't walk backwards!\"  Patient demonstrated loss of balance with tandem stance (foot placed in front of the other) and single leg stance.  Patient amb 150 feet with 2WW and SBA.  Patient with improved gait speed and narrowed base of support.  Patient able to tolerate mild balance perturbations (turning corners, turning head) without loss of balance.  Recommended to patient and spouse use of walker with all mobility, placement of grab bars within house at single steps to assist with balance, and Home PT " "for ongoing gait and balance training as well as home safety evaluation (for placement of grab bars).   in agreement, patient polite but silent by end of session.  Immediately following session,  came out of room stating that patient wanted to leave hospital \"Right now.\"  RN staff aware.  Barriers to return to prior living situation: high fall risk, stairs, decreased balance, decreased safety awareness, limited insight into deficits  Recommendations for discharge: Home with CGA from spouse on stairs, use of walker with all mobility, home PT for home safety evaluation.  Rationale for recommendations: Patient presents below baseline for functional mobility.  Patient with balance deficits resulting in high fall risk.  Patient with poor safety awareness and limited insight into deficits.  Patient would benefit from home PT to address balance and gait deficits and to also perform home safety evaluation.       Entered by: Vivien Carlin 01/10/2020 11:41 AM       "

## 2020-01-10 NOTE — PROGRESS NOTES
01/10/20 1051   Quick Adds   Type of Visit Initial PT Evaluation   Living Environment   Lives With spouse   Living Arrangements house   Home Accessibility stairs to enter home;stairs within home   Number of Stairs, Main Entrance 6   Stair Railings, Main Entrance railing on right side (ascending)   Number of Stairs, Within Home, Primary 2   Stair Railings, Within Home, Primary railing on right side (ascending)   Living Environment Comment Patient has multiple levels within home with multiple single steps to access kitchen, dining room, living room.  There is a rail to enter living room, other steps in house do not have rails.  Walk in shower, no grab bars, no shower chair.   is retired and is at home most of the day.   Self-Care   Usual Activity Tolerance fair   Current Activity Tolerance poor   Regular Exercise No   Equipment Currently Used at Home cane, straight   Activity/Exercise/Self-Care Comment Spouse reports decreased balance and independence a few days prior to coming to the hospital   Functional Level Prior   Ambulation 0-->independent   Transferring 0-->independent   Toileting 0-->independent   Bathing 0-->independent   Fall history within last six months yes   Number of times patient has fallen within last six months 2   Which of the above functional risks had a recent onset or change? ambulation;transferring   Prior Functional Level Comment Patient reports prior to a wekk before admission, patient was independent with mobility, good balance.  Within a couple of days prior to admission, patient with decreased independence with poor balance.  Patient sustained 2 falls, spouse unable to lift patient up.  Patient crawled to bed, spouse provided max A to enter bed.     General Information   Onset of Illness/Injury or Date of Surgery - Date 01/08/20   Referring Physician Scarlet Damico MD   Patient/Family Goals Statement return to home, safety with walking   Pertinent History of Current Problem  (include personal factors and/or comorbidities that impact the POC) Patient with PMH including cirrhosis, HTN, prior gastric ulcer, breast cancer s/p lumpectomy and radiation now admitted with diffuse weakness, poor oral intake and vomiting with gap metabolic acidosis and likely pancreatitis.    Precautions/Limitations fall precautions   Cognitive Status Examination   Orientation orientation to person, place and time   Personal Safety and Judgment impulsive   Cognitive Comment decreased safety awareness, limited insight into deficits   Pain Assessment   Patient Currently in Pain No   Posture    Posture Forward head position;Protracted shoulders   Range of Motion (ROM)   ROM Comment WFL   Strength   Strength Comments Decreased global strength, decreased    Bed Mobility   Bed Mobility Comments independent   Transfer Skills   Transfer Comments sitting<>standing CGA without assistive device, SBA with 2WW   Gait   Gait Comments amb 100 feet without assistive device with CGA   Balance   Balance Comments Patient with balance deficits and hx of falls   General Therapy Interventions   Planned Therapy Interventions balance training;gait training;transfer training;home program guidelines;progressive activity/exercise   Clinical Impression   Criteria for Skilled Therapeutic Intervention yes, treatment indicated   PT Diagnosis Decreased independence with mobility   Influenced by the following impairments decreased balance   Functional limitations due to impairments Difficulty with gait, high fall risk   Clinical Presentation Stable/Uncomplicated   Clinical Presentation Rationale complex pmh, stable presentation, fair social support   Clinical Decision Making (Complexity) Low complexity   Therapy Frequency Daily   Predicted Duration of Therapy Intervention (days/wks) 3 days   Anticipated Equipment Needs at Discharge walker   Anticipated Discharge Disposition Home with Home Therapy   Risk & Benefits of therapy have been explained  "Yes   Patient, Family & other staff in agreement with plan of care Yes   [employer] AM-PAC TM \"6 Clicks\"   2016, Trustees of [employer], under license to [employer].  All rights reserved.   6 Clicks Short Forms Basic Mobility Inpatient Short Form   [employer] [facility] AM-PAC  \"6 Clicks\" V.2 Basic Mobility Inpatient Short Form   1. Turning from your back to your side while in a flat bed without using bedrails? 4 - None   2. Moving from lying on your back to sitting on the side of a flat bed without using bedrails? 4 - None   3. Moving to and from a bed to a chair (including a wheelchair)? 3 - A Little   4. Standing up from a chair using your arms (e.g., wheelchair, or bedside chair)? 3 - A Little   5. To walk in hospital room? 3 - A Little   6. Climbing 3-5 steps with a railing? 3 - A Little   Basic Mobility Raw Score (Score out of 24.Lower scores equate to lower levels of function) 20   Total Evaluation Time   Total Evaluation Time (Minutes) 5     "

## 2020-01-10 NOTE — CONSULTS
"Care Transition Initial Assessment - SW     Met with: Patient and Family  Spouse Blas present   Active Problems:    Alcohol withdrawal (H)       DATA  Lives With: (P) spouse   Living Arrangements: house- has 6 stairs to enter the home.    Description of Support System: (P) Supportive, Involved  Who is your support system?: (P) , Children  Support Assessment: (P) Adequate family and caregiver support, Adequate social supports.   Identified issues/concerns regarding health management: PT admitted due to ETOH withdrawals    @   Resources List: (P) Home Care- Offered and refused         Transportation Anticipated: (P) family or friend will provide    ASSESSMENT  Cognitive Status:  awake, alert and oriented  Concerns to be addressed: Met with pt and spouse. MD requested that SW see for CD resources.  Pt did agree that she does drink. PT stated she gas a glass of wine daily,. A bourbon at night and 2 vodka drinks. PT does not believe she has a drinking program and not interested in any resources related to CD  SW asked spouse about his habit. He did report to also drinking and he drives to the liquor store and does purchase Alcohol for himself and his wife, \" this is How I know that she is drinking more than she reported  to you\"  CARMENCITA spoke to both pt and spouse about Yariel's program, and provided Resources for CD at [facility] and Mediation support should family feel some kind of intervention would be helpful.    SW went back to offer home care following PT eval.. Pt was clear that she did not want this and refused to agree she needed this support.  Pt was requesting to leave and was willing leave AMA       PLAN  MD/CARMENCITA met with pt and spouse, after a lot of encouragement pt did agree to stay one more night    No further needs at this time. PT/Spouse aware they can request SW again should pt change her mind about refusion home care PT    [facility]  Inpatient Care Coordination   [phone]  M James " [facility]

## 2020-01-10 NOTE — PLAN OF CARE
Pt remains hospitalized for ETOH WD/pancreatitis.Vital signs stable, on RA, denies any pain/nausea. CIWA scores = 0. Up with assist x 1, ambulated to bathroom, voiding without difficulty, unsteady at times. PIV /hr. On tele monitoring. Phosp recheck 1.7. PT/SW/Nutrition following.

## 2020-01-10 NOTE — PROGRESS NOTES
"[facility]  Hospitalist Progress Note  Scarlet Damico MD 01/09/20  Text Page  Pager: [phone] (7am-6pm)    Reason for Stay (Diagnosis): weakness, alcohol dependence, pancreatitis         Assessment and Plan:      Summary of Stay: Rizwana Aguilar is a 69 year old female with past medical history of possible cirrhosis, hypertension, prior gastric ulcer (in the setting of significant NSAID use), breast cancer status post lumpectomy and radiation who was admitted on 1/8/2020 with diffuse weakness at the urging of her  to come to the hospital with associated poor oral intake and vomiting.  Patient was found to have anion gap metabolic acidosis and pancreatitis.  Electrolyte abnormalities are improving but patient remains weak.  She has not been eating, diet advanced today and need to encourage oral intake.    Addendum: This afternoon patient wanted to leave AMA.  I met with her and her .  She had only eaten a few bites of cottage cheese.  Explained that she needs to remain hospitalized and she wanted to leave.  Her  states she cannot come home right now in her current state.  She stated she would go to a hotel, when asked how she would get there she stated she would call a taxi.  She ultimately agreed to stay overnight.  She is not holdable so if she refuses to stay overnight she would need to sign out AMA (although I think its unlikely that she would actually follow through with this).    Problem List/Assessment and Plan:     Alcohol abuse, alcohol dependence, at risk for alcohol withdrawal: Patient drinks alcohol daily.  She minimizes the amount of alcohol she drinks and does not feel that she has a problem with alcohol.  Discussed the importance of alcohol cessation but patient reports that this is \"part of my routine\" and she is not interested in quitting.  Declines chemical dependency consultation.  At this time monitoring for withdrawal but she has not required treatment.  I spoke " with the patient and spouse, Blas today at the hospital as well as the patient's daughter, Mickie by phone (lives in [place]).  They were wondering about commitment.  Explained that at this time I do not have enough to commit the patient.  Social work has been consulted to meet with the family as well.  Spoke extensively with the patient's family regarding alcohol treatment.  -CIWA protocol with vitamins  -Offered CD consult but patient declines this  -Social work consult     Alcoholic pancreatitis: Presented with poor oral intake, nausea, vomiting for several days prior to admission.  She had no abdominal pain on admission.  She was found to have a lipase of 1000 as well as mild elevation of AST.  As above she drinks on a daily basis.  This morning she has mild mid abdominal pain and nausea but no emesis so far.  She has tolerated some clears but has refused to eat anything further.  I explained today that she needs to be eating before she can leave the hospital.  We will monitor her symptoms when she starts to eat.    -Regular diet  -Antiemetics as needed     Vomiting: This could be due to pancreatitis as above.  She also has history of LA grade C reflux esophagitis and non bleeding gastric ulcers, though this was in the setting of daily NSAID use and follow-up EGD demonstrated healing.  Certainly could have alcoholic gastritis.  Patient adamantly denies recent NSAID use.  -PPI  -PRN antiemetics     Anion gap metabolic acidosis - Resolve: Presented with a bicarbonate of 13 and an anion gap of 26.  Patient also has ketones positive on UA and a history of poor oral intake for several days as well as significant alcohol use.  Her lactate was elevated to 2.7 on admission and improved to 2.1 so far with fluids.  This is likely a combination of starvation ketoacidosis, lactic acidosis as well as related to her significant alcohol use.  This has resolved with IV fluids.  -Stop IV fluids and encourage oral intake  -BMP  "in the morning     Possible liver cirrhosis: She has seen gastroenterology in the outpatient setting in 2018, note says \"likely cirrhosis.\"  Etiology most likely due to alcohol but she did have elevated ferritin, though TIBC was normal at 284, so not likely from hemochromatosis. Previous viral hepatitis testing negative. ASMA tested positive at 1:80 but on repeat was negative. No varices have been seen but patient noted to have changes consistent with portal hypertensive gastropathy on previous EGD. No known history of HE, though  reports recent decline in cognitive function.  Right upper quadrant ultrasound showed no ascites.  -Follow-up in the outpatient setting with GI  -Certainly needs alcohol cessation    Mild hyponatremia -resolved: Na 132 on admission, hypovolemic hyponatremia with the report of nausea/vomiting as well as poor oral intake.  Sodium normalized with hydration.     HTN: Hypertensive on admission. Home medications are atenolol 50 mg daily and lisinopril 30 mg. Continue the same.    Diffuse weakness: Due to combination of metabolic abnormalities, poor oral intake and alcohol use.  Treatment as above.  PT has been consulted.    Severe malnutrition: This is in the context of ongoing alcohol use and poor oral intake.  Nutrition consulted.    Mild cognitive impairment: Patient seems to have very poor insight into her medical disease.  She has a hard time giving me an accurate history.  The patient's  has noticed that her cognition has seemed to be declining over the past several months and reportedly has plans to have neuropsych testing in the outpatient setting.   I discussed with the patient's daughter by phone that cognitive decline could certainly be related to her ongoing alcohol use.  I would recommend cessation of alcohol and if cognitive impairment is not improving then consider further work-up.    Diet: Regular Diet Adult    DVT Prophylaxis: Enoxaparin (Lovenox) ANNA Billings " "Catheter: not present  Code Status: DNR/DNI      Disposition Plan   Expected discharge: 1-2 days, recommended to prior living arrangement once safe disposition plan/ TCU bed available and eating well.  Entered: Scarlet Damico MD 01/10/2020, 11:32 AM       The patient's care was discussed with the Bedside Nurse, Patient and Patient's Family.    Hospitalist Service  [facility]          Interval History (Subjective):      The patient was seen at the bedside with her spouse, Blas this morning.  She states she feels great and wants to go home.  I spoke with her nurse who has been trying to encourage her to eat but she has declined this.  The patient states that she has not been offered any food.  She has only done sips of clears.  I explained that she cannot leave the hospital if she is not eating.  She agrees that she will try to eat.  Denies nausea, vomiting or pain.  She continues to states she feels fine and does not want to be here.    I met with her , Blas, outside the room.  He asked that I call the patient's daughter, Mickie by phone.  She lives in [place].  He asked about possible commitment and we talked about this.  At this time I do not have enough to qualify her for commitment I did discuss that it is an option for family to pursue commitment in the outpatient setting.    I also called Mickie and reviewed her mother's status with her.  Again discussed commitment.  All of her questions were answered.  She was very appreciative of the phone call and will talk to Blas after he speaks with social work.                  Physical Exam:      Last Vital Signs:  /68 (BP Location: Right arm)   Pulse 142   Temp 96.5  F (35.8  C) (Oral)   Resp 16   Ht 1.651 m (5' 5\")   Wt 56 kg (123 lb 7.3 oz)   SpO2 98%   Breastfeeding No   BMI 20.54 kg/m      General: Alert, awake, no acute distress.  HEENT: Normocephalic and atraumatic, eyes anicteric and without scleral injection, EOMI, face " symmetric, MMM.  Cardiac: RRR, normal S1, S2. No m/g/r, no LE edema.  Pulmonary: Normal chest rise, normal work of breathing.  Lungs CTAB without crackles or wheezing.  Abdomen: soft, non-tender, non-distended.  Normoactive bowel sounds, no guarding or rebound tenderness.  Extremities: no deformities.  Warm, well perfused.  Skin: no rashes or lesions.  Warm and Dry.  Neuro: No focal deficits.  Speech clear.  Coordination and strength grossly normal.  Psych: Alert and oriented x3, very poor insight into her disease.  Cannot give accurate description of what was occurring PTA. Appropriate affect.         Medications:      All current medications were reviewed with changes reflected in problem list.         Data:      All new lab and imaging data was reviewed.   Labs:  Recent Labs   Lab 01/10/20  0702 01/09/20  0729 01/08/20  1526   WBC 6.3 7.4 9.1   HGB 12.2 11.9 15.5   HCT 34.5* 35.1 44.2   MCV 99 102* 102*    175 240     Recent Labs   Lab 01/10/20  0702 01/09/20  2238 01/09/20  1647 01/09/20  0729 01/08/20  1526     --   --  138 132*   POTASSIUM 3.2* 3.4  --  3.2* 3.6   CHLORIDE 106  --   --  108 93*   CO2 20  --   --  10* 13*   ANIONGAP 11  --   --  20* 26*   GLC 92  --   --  81 90   BUN 3*  --   --  8 9   CR 0.42*  --   --  0.58 0.63   GFRESTIMATED >90  --   --  >90 >90   GFRESTBLACK >90  --   --  >90 >90   KHUSHI 8.9  --   --  8.4* 10.1   MAG  --   --  3.0* 1.5* 1.7   PHOS 1.3* 1.7*  --  1.5*  --    PROTTOTAL 5.1*  --   --  5.6* 8.2   ALBUMIN 2.4*  --   --  2.8* 4.1   BILITOTAL 0.6  --   --  1.0 1.1   ALKPHOS 58  --   --  61 90   AST 43  --   --  60* 103*   ALT 23  --   --  28 47     Recent Labs   Lab 01/08/20  1526   LIPASE 1,048*      Imaging:   No results found for this or any previous visit (from the past 24 hour(s)).    Scarlet Damico MD.       I spent 35 minutes with the patient/family discussing care plan as well as alcohol treatment.

## 2020-01-10 NOTE — PLAN OF CARE
"Pt is alert.  Forgetfulness and self safety are apparent.  K 3.2, phos 1.3 this AM.  Both replaced today with recheck at 1730. Ciwa score is 0. Pt has had very little po intake, having a few bites of mac and cheese and cottage cheese. Pt asked for AMA forms earlier today, see MD note.  Pt  supportive, but not stating he is comfortable with taking patient home today.  Pt agreed to stay the night.  Educated on electrolytes, however education is difficult, as patient states, \"there is nothing wrong with me.\"  BP 97/56 (BP Location: Right arm)   Pulse 142   Temp 98.5  F (36.9  C) (Oral)   Resp 16   Ht 1.651 m (5' 5\")   Wt 56 kg (123 lb 7.3 oz)   SpO2 98%   Breastfeeding No   BMI 20.54 kg/m     "

## 2020-01-10 NOTE — PLAN OF CARE
Pt up Ax1 w/ gait belt. Unsteady, refuses to use walker. VSS. Denies pain. Oriented x4, forgetful. Incontinent of urine, refuses to wear a brief. Shower offered but pt refused. Tele NSR. CIWA 0 & 0. Poor appetite, not eating.

## 2020-01-11 ENCOUNTER — APPOINTMENT (OUTPATIENT)
Dept: PHYSICAL THERAPY | Facility: CLINIC | Age: 70
DRG: 438 | End: 2020-01-11
Payer: COMMERCIAL

## 2020-01-11 VITALS
OXYGEN SATURATION: 98 % | TEMPERATURE: 97.6 F | DIASTOLIC BLOOD PRESSURE: 67 MMHG | BODY MASS INDEX: 20.57 KG/M2 | HEIGHT: 65 IN | RESPIRATION RATE: 20 BRPM | WEIGHT: 123.46 LBS | HEART RATE: 142 BPM | SYSTOLIC BLOOD PRESSURE: 109 MMHG

## 2020-01-11 LAB
ANION GAP SERPL CALCULATED.3IONS-SCNC: 5 MMOL/L (ref 3–14)
BUN SERPL-MCNC: 3 MG/DL (ref 7–30)
CALCIUM SERPL-MCNC: 8.9 MG/DL (ref 8.5–10.1)
CHLORIDE SERPL-SCNC: 107 MMOL/L (ref 94–109)
CO2 SERPL-SCNC: 28 MMOL/L (ref 20–32)
CREAT SERPL-MCNC: 0.49 MG/DL (ref 0.52–1.04)
ERYTHROCYTE [DISTWIDTH] IN BLOOD BY AUTOMATED COUNT: 12.8 % (ref 10–15)
GFR SERPL CREATININE-BSD FRML MDRD: >90 ML/MIN/{1.73_M2}
GLUCOSE SERPL-MCNC: 103 MG/DL (ref 70–99)
HCT VFR BLD AUTO: 31.8 % (ref 35–47)
HGB BLD-MCNC: 11 G/DL (ref 11.7–15.7)
MCH RBC QN AUTO: 34.4 PG (ref 26.5–33)
MCHC RBC AUTO-ENTMCNC: 34.6 G/DL (ref 31.5–36.5)
MCV RBC AUTO: 99 FL (ref 78–100)
PHOSPHATE SERPL-MCNC: 3.1 MG/DL (ref 2.5–4.5)
PLATELET # BLD AUTO: 141 10E9/L (ref 150–450)
POTASSIUM SERPL-SCNC: 3.2 MMOL/L (ref 3.4–5.3)
RBC # BLD AUTO: 3.2 10E12/L (ref 3.8–5.2)
SODIUM SERPL-SCNC: 140 MMOL/L (ref 133–144)
WBC # BLD AUTO: 4.5 10E9/L (ref 4–11)

## 2020-01-11 PROCEDURE — 36415 COLL VENOUS BLD VENIPUNCTURE: CPT | Performed by: INTERNAL MEDICINE

## 2020-01-11 PROCEDURE — 80048 BASIC METABOLIC PNL TOTAL CA: CPT | Performed by: INTERNAL MEDICINE

## 2020-01-11 PROCEDURE — 97530 THERAPEUTIC ACTIVITIES: CPT | Mod: GP | Performed by: PHYSICAL THERAPIST

## 2020-01-11 PROCEDURE — 84100 ASSAY OF PHOSPHORUS: CPT | Performed by: INTERNAL MEDICINE

## 2020-01-11 PROCEDURE — 85027 COMPLETE CBC AUTOMATED: CPT | Performed by: INTERNAL MEDICINE

## 2020-01-11 PROCEDURE — 25000132 ZZH RX MED GY IP 250 OP 250 PS 637: Performed by: INTERNAL MEDICINE

## 2020-01-11 PROCEDURE — 99239 HOSP IP/OBS DSCHRG MGMT >30: CPT | Performed by: INTERNAL MEDICINE

## 2020-01-11 PROCEDURE — 97116 GAIT TRAINING THERAPY: CPT | Mod: GP | Performed by: PHYSICAL THERAPIST

## 2020-01-11 PROCEDURE — 82565 ASSAY OF CREATININE: CPT | Performed by: INTERNAL MEDICINE

## 2020-01-11 RX ORDER — LANOLIN ALCOHOL/MO/W.PET/CERES
100 CREAM (GRAM) TOPICAL DAILY
Qty: 30 TABLET | Refills: 0 | Status: SHIPPED | OUTPATIENT
Start: 2020-01-11 | End: 2022-12-04

## 2020-01-11 RX ORDER — FOLIC ACID 1 MG/1
1 TABLET ORAL DAILY
Qty: 30 TABLET | Refills: 0 | Status: SHIPPED | OUTPATIENT
Start: 2020-01-11 | End: 2024-04-27

## 2020-01-11 RX ORDER — GABAPENTIN 300 MG/1
300 CAPSULE ORAL AT BEDTIME
Qty: 30 CAPSULE | Refills: 0 | Status: SHIPPED | OUTPATIENT
Start: 2020-01-11 | End: 2021-05-07

## 2020-01-11 RX ADMIN — POTASSIUM CHLORIDE 40 MEQ: 1.5 POWDER, FOR SOLUTION ORAL at 10:24

## 2020-01-11 RX ADMIN — FOLIC ACID 1 MG: 1 TABLET ORAL at 08:53

## 2020-01-11 RX ADMIN — MULTIPLE VITAMINS W/ MINERALS TAB 1 TABLET: TAB at 08:53

## 2020-01-11 RX ADMIN — Medication 100 MG: at 08:53

## 2020-01-11 RX ADMIN — OMEPRAZOLE 40 MG: 20 CAPSULE, DELAYED RELEASE ORAL at 06:45

## 2020-01-11 ASSESSMENT — ACTIVITIES OF DAILY LIVING (ADL)
ADLS_ACUITY_SCORE: 18
ADLS_ACUITY_SCORE: 15
ADLS_ACUITY_SCORE: 15

## 2020-01-11 NOTE — PLAN OF CARE
Patient remains hospitalized for ETOH withdrawal. Forgetful at times. Pulled out IV towards end of shift, and states she did it in a 'subconscious state,' and thought she was in a hotel instead of the hospital. Denies symptoms of withdrawal, CIWA Q4H scoring 0. K+ 3.5 and Phos 1.9. Currently replacing phos. Telemetry- accelerated junction rhythm. Regular diet- poor appetite. Voiding without difficulty. Last BM 1/10. BS hypoactive. Eager to discharge home, spoke to daughter tonight who stated that she, her sister, and patients  will be having a meeting tomorrow to discuss patients ETOH abuse. Will continue to monitor.

## 2020-01-11 NOTE — PLAN OF CARE
Discharge Planner PT   Patient plan for discharge: Home with family. States that her  will install a  rail at the two stairs currently without a rail.  States she will not use a walker . States she goes to [employer] to exercise.   Current status: independent sit to stand from bed, gait  feet, no assist 400 feet without deviation from path, good gait pattern, no LOB, turns 180 degrees and head turns included. Full flight of stairs with handrail and SBA, not able to do a couple of stairs with just hand on the wall as she would at home until  installs the rail.   Barriers to return to prior living situation: stairs.  Currently demonstrates good balance and functional mobility but may change  if ETOH intake  changes.  Recommendations for discharge: home with  or daughter supervision on the stairs.  Rationale for recommendations: at this time pt functioning well with mobility. Due  to history of fall and some deconditioning recommend supervision to assist prn on the stairs for safety.        Entered by: Delia Wilson 01/11/2020 9:13 AM

## 2020-01-11 NOTE — PROGRESS NOTES
Physical Therapy Discharge Summary    Reason for therapy discharge:    Discharged to home.    Progress towards therapy goal(s). See goals on Care Plan in [facility] electronic health record for goal details.  Goals met    Therapy recommendation(s):    Continue home exercise program. Supervision on the stairs.

## 2020-01-11 NOTE — PLAN OF CARE
Patient alert and oriented x4. Up Ax1 with gait belt, ambulating to bathroom. Phos replacement finished this shift, recheck 3.1. Scoring 0 on CIWA scale throughout shift. On tele, per tele tech AJR, HR 68. Denies pain, and nausea. Discharge pending.

## 2020-01-11 NOTE — DISCHARGE SUMMARY
"[facility]  Discharge Summary  Name: Rizwana Aguilar    MRN: [id number]  YOB: 1950    Age: 69 year old  Date of Discharge:  1/11/2020  Date of Admission: 1/8/2020  Primary Care Provider: Adry Chery  Discharge Physician:  Mp Roblero MD  Discharging Service:  Hospitalist      Hospital Course/Discharge Diagnoses:  I was contacted by nursing staff earlier this morning as Mrs. Aguilar was requesting immediate discharge and stating that she would be leaving the hospital today \"no matter what\".  Chart reviewed, care discussed with her nurse.  I note she was making some comments about leaving AMA yesterday and ultimately agreed to stay after some discussion with Dr. Damico.  At that point she was felt not to be holdable.  Overnight Ms. Aguilar did not receive Ativan for withdrawal and this morning is alert and oriented.  She has been slowly advancing her diet.  Her  is also present as is her oldest daughter.    We had a long and very explicit conversation regarding the risks associated with ongoing alcohol use including recurrent/worsening pancreatitis, progressive cognitive decline, falls, bleeding ulcers among others.  Her family appears extremely supportive and appropriately concerned about her.  After further discussion the patient agrees to allow her family to dump out all alcohol at home and agrees not to operate a motor vehicle unless explicitly cleared through her primary care clinic.  And note per discussion with family she has not really been driving in any case.    After discussion we are starting 300 mg gabapentin in the evening to help with restlessness and cravings.  She should follow-up with her primary care clinic and if willing to do so chemical dependency clinic to discuss other options to help remain sober.  Her family appeared very thankful and agreeable to the plan of care and will be helping her with the transition home.  I also did discuss with physical therapy and they " "felt she did not necessarily need a walker at home and did relatively well on stairs.     Please see the plan of care from Dr. Damico's progress note from yesterday for full details regarding hospital course up until this morning:     \"Rizwana Aguilar is a 69 year old female with past medical history of possible cirrhosis, hypertension, prior gastric ulcer (in the setting of significant NSAID use), breast cancer status post lumpectomy and radiation who was admitted on 1/8/2020 with diffuse weakness at the urging of her  to come to the hospital with associated poor oral intake and vomiting.  Patient was found to have anion gap metabolic acidosis and pancreatitis.  Electrolyte abnormalities are improving but patient remains weak.  She has not been eating, diet advanced today and need to encourage oral intake.     Addendum: This afternoon patient wanted to leave AMA.  I met with her and her .  She had only eaten a few bites of cottage cheese.  Explained that she needs to remain hospitalized and she wanted to leave.  Her  states she cannot come home right now in her current state.  She stated she would go to a hotel, when asked how she would get there she stated she would call a taxi.  She ultimately agreed to stay overnight.  She is not holdable so if she refuses to stay overnight she would need to sign out AMA (although I think its unlikely that she would actually follow through with this).     Problem List/Assessment and Plan:      Alcohol abuse, alcohol dependence, at risk for alcohol withdrawal: Patient drinks alcohol daily.  She minimizes the amount of alcohol she drinks and does not feel that she has a problem with alcohol.  Discussed the importance of alcohol cessation but patient reports that this is \"part of my routine\" and she is not interested in quitting.  Declines chemical dependency consultation.  At this time monitoring for withdrawal but she has not required treatment.  I spoke with the " patient and spouse, Blas today at the hospital as well as the patient's daughter, Mickie by phone (lives in [place]).  They were wondering about commitment.  Explained that at this time I do not have enough to commit the patient.  Social work has been consulted to meet with the family as well.  Spoke extensively with the patient's family regarding alcohol treatment.  -CIWA protocol with vitamins  -Offered CD consult but patient declines this  -Social work consult     Alcoholic pancreatitis: Presented with poor oral intake, nausea, vomiting for several days prior to admission.  She had no abdominal pain on admission.  She was found to have a lipase of 1000 as well as mild elevation of AST.  As above she drinks on a daily basis.  This morning she has mild mid abdominal pain and nausea but no emesis so far.  She has tolerated some clears but has refused to eat anything further.  I explained today that she needs to be eating before she can leave the hospital.  We will monitor her symptoms when she starts to eat.    -Regular diet  -Antiemetics as needed     Vomiting: This could be due to pancreatitis as above.  She also has history of LA grade C reflux esophagitis and non bleeding gastric ulcers, though this was in the setting of daily NSAID use and follow-up EGD demonstrated healing.  Certainly could have alcoholic gastritis.  Patient adamantly denies recent NSAID use.  -PPI  -PRN antiemetics     Anion gap metabolic acidosis - Resolve: Presented with a bicarbonate of 13 and an anion gap of 26.  Patient also has ketones positive on UA and a history of poor oral intake for several days as well as significant alcohol use.  Her lactate was elevated to 2.7 on admission and improved to 2.1 so far with fluids.  This is likely a combination of starvation ketoacidosis, lactic acidosis as well as related to her significant alcohol use.  This has resolved with IV fluids.  -Stop IV fluids and encourage oral intake  -BMP in the  "morning     Possible liver cirrhosis: She has seen gastroenterology in the outpatient setting in 2018, note says \"likely cirrhosis.\"  Etiology most likely due to alcohol but she did have elevated ferritin, though TIBC was normal at 284, so not likely from hemochromatosis. Previous viral hepatitis testing negative. ASMA tested positive at 1:80 but on repeat was negative. No varices have been seen but patient noted to have changes consistent with portal hypertensive gastropathy on previous EGD. No known history of HE, though  reports recent decline in cognitive function.  Right upper quadrant ultrasound showed no ascites.  -Follow-up in the outpatient setting with GI  -Certainly needs alcohol cessation     Mild hyponatremia -resolved: Na 132 on admission, hypovolemic hyponatremia with the report of nausea/vomiting as well as poor oral intake.  Sodium normalized with hydration.     HTN: Hypertensive on admission. Home medications are atenolol 50 mg daily and lisinopril 30 mg. Continue the same.     Diffuse weakness: Due to combination of metabolic abnormalities, poor oral intake and alcohol use.  Treatment as above.  PT has been consulted.     Severe malnutrition: This is in the context of ongoing alcohol use and poor oral intake.  Nutrition consulted.     Mild cognitive impairment: Patient seems to have very poor insight into her medical disease.  She has a hard time giving me an accurate history.  The patient's  has noticed that her cognition has seemed to be declining over the past several months and reportedly has plans to have neuropsych testing in the outpatient setting.   I discussed with the patient's daughter by phone that cognitive decline could certainly be related to her ongoing alcohol use.  I would recommend cessation of alcohol and if cognitive impairment is not improving then consider further work-up.\"       Discharge Disposition:  Discharged to home     Allergies:  No Known Allergies "     Discharge Medications:        Review of your medicines      START taking      Dose / Directions   folic acid 1 MG tablet  Commonly known as:  FOLVITE      Dose:  1 mg  Take 1 tablet (1 mg) by mouth daily  Quantity:  30 tablet  Refills:  0     order for DME  Used for:  High anion gap metabolic acidosis      Equipment being ordered: Walker Wheels () and Walker ()  Treatment Diagnosis: decreased stability with gait  Quantity:  1 each  Refills:  0     vitamin B1 100 MG tablet  Commonly known as:  THIAMINE      Dose:  100 mg  Take 1 tablet (100 mg) by mouth daily  Quantity:  30 tablet  Refills:  0        CONTINUE these medicines which have NOT CHANGED      Dose / Directions   acetaminophen 325 MG tablet  Commonly known as:  TYLENOL      Dose:  325-650 mg  Take 325-650 mg by mouth every 8 hours as needed for mild pain  Refills:  0     atenolol 50 MG tablet  Commonly known as:  TENORMIN      Dose:  50 mg  Take 50 mg by mouth daily  Refills:  0     calcium carbonate 500 MG tablet  Commonly known as:  OS-KHUSHI      Dose:  1 tablet  Take 1 tablet by mouth daily  Refills:  0     lisinopril 30 MG tablet  Commonly known as:  PRINIVIL/ZESTRIL      Dose:  30 mg  Take 30 mg by mouth daily  Refills:  0           Where to get your medicines      These medications were sent to [facility] [address] [address]    Phone:  [phone]     folic acid 1 MG tablet    vitamin B1 100 MG tablet     Some of these will need a paper prescription and others can be bought over the counter. Ask your nurse if you have questions.    Bring a paper prescription for each of these medications    order for DME         Condition on Discharge:  Discharge condition: Stable       Code status on discharge: Full Code     History of Illness:  See detailed admission note for full details.    Physical Exam:  Vital signs:  Temp: 97.6  F (36.4  C) Temp src: Oral BP: 109/67  "  Heart Rate: 70 Resp: 20 SpO2: 98 % O2 Device: None (Room air)   Height: 165.1 cm (5' 5\") Weight: 56 kg (123 lb 7.3 oz)  Estimated body mass index is 20.54 kg/m  as calculated from the following:    Height as of this encounter: 1.651 m (5' 5\").    Weight as of this encounter: 56 kg (123 lb 7.3 oz).    Wt Readings from Last 1 Encounters:   01/08/20 56 kg (123 lb 7.3 oz)     General: Alert, awake, no acute distress.  HEENT: NC/AT, eyes anicteric, external occular movements intact, face symmetric.  Dentition WNL, MM moist.  Cardiac: RRR, S1, S2.  No murmurs appreciated.  Pulmonary: Normal chest rise, normal work of breathing.  Lungs CTA BL  Abdomen: soft, non-tender, non-distended.  Bowel Sounds Present.  No guarding.  Extremities: no deformities.  Warm, well perfused.  Skin: no rashes or lesions noted.  Warm and Dry.  Neuro: No focal deficits noted.  Speech clear.  Coordination and strength grossly normal.  Psych: Appropriate affect.    Procedures other than Imaging:  None  Results for orders placed or performed during the hospital encounter of 01/08/20   US Abdomen Limited    Narrative    US ABDOMEN LIMITED   1/8/2020 7:26 PM     HISTORY: Evaluate for cirrhosis.    COMPARISON: None.    FINDINGS:    Gallbladder: Normal with no cholelithiasis, wall thickening or focal  tenderness.      Bile ducts: CHD is normal diameter. No intrahepatic biliary  dilatation.    Liver: Liver length is 14.7 cm. Increased echogenicity of the liver  compared to the right kidney.    Pancreas: Partially obscured by overlying bowel gas,  but grossly  unremarkable.     Right kidney: Normal.     Aorta and IVC: Not specifically assessed.       Impression    IMPRESSION: Increased echogenicity of the liver compared to the right  kidney could relate to fatty infiltration or underlying liver disease.  No acute abnormality is seen.    LUCIEN STEVENSON MD        Imaging:  Results for orders placed or performed during the hospital encounter of 01/08/20   US " Abdomen Limited    Narrative    US ABDOMEN LIMITED   1/8/2020 7:26 PM     HISTORY: Evaluate for cirrhosis.    COMPARISON: None.    FINDINGS:    Gallbladder: Normal with no cholelithiasis, wall thickening or focal  tenderness.      Bile ducts: CHD is normal diameter. No intrahepatic biliary  dilatation.    Liver: Liver length is 14.7 cm. Increased echogenicity of the liver  compared to the right kidney.    Pancreas: Partially obscured by overlying bowel gas,  but grossly  unremarkable.     Right kidney: Normal.     Aorta and IVC: Not specifically assessed.       Impression    IMPRESSION: Increased echogenicity of the liver compared to the right  kidney could relate to fatty infiltration or underlying liver disease.  No acute abnormality is seen.    LUCIEN STEVENSON MD       Consultations:  none       Recent Lab Results:  Recent Labs   Lab 01/11/20  0730 01/10/20  0702 01/09/20  0729   WBC 4.5 6.3 7.4   HGB 11.0* 12.2 11.9   HCT 31.8* 34.5* 35.1   MCV 99 99 102*   * 162 175          Lab Results   Component Value Date     01/11/2020     01/10/2020     01/09/2020    Lab Results   Component Value Date    CHLORIDE 107 01/11/2020    CHLORIDE 106 01/10/2020    CHLORIDE 108 01/09/2020    Lab Results   Component Value Date    BUN 3 01/11/2020    BUN 3 01/10/2020    BUN 8 01/09/2020      Lab Results   Component Value Date    POTASSIUM 3.2 01/11/2020    POTASSIUM 3.5 01/10/2020    POTASSIUM 3.8 01/10/2020    Lab Results   Component Value Date    CO2 28 01/11/2020    CO2 20 01/10/2020    CO2 10 01/09/2020    Lab Results   Component Value Date    CR 0.49 01/11/2020    CR 0.42 01/10/2020    CR 0.58 01/09/2020             Pending Results:    Unresulted Labs Ordered in the Past 30 Days of this Admission     No orders found from 12/9/2019 to 1/9/2020.           Discharge Instructions and Follow-Up:   Discharge Procedure Orders   Reason for your hospital stay   Order Comments: Alcohol withdrawal     Follow-up and  recommended labs and tests    Order Comments: Follow up with primary care provider, FELIPE GODOY, within 7 days for hospital follow- up.  I recommend you discuss options for outpatient CD treatment and alcohol cessation.     Activity   Order Comments: Your activity upon discharge: activity as tolerated     Order Specific Question Answer Comments   Is discharge order? Yes      Diet   Order Comments: Follow this diet upon discharge: Orders Placed This Encounter      Regular Diet Adult     Order Specific Question Answer Comments   Is discharge order? Yes        Total time spent in face to face contact with the patient and coordinating discharge was:  50 Minutes.

## 2021-05-07 ENCOUNTER — APPOINTMENT (OUTPATIENT)
Dept: CT IMAGING | Facility: CLINIC | Age: 71
DRG: 438 | End: 2021-05-07
Attending: PHYSICIAN ASSISTANT
Payer: COMMERCIAL

## 2021-05-07 ENCOUNTER — HOSPITAL ENCOUNTER (INPATIENT)
Facility: CLINIC | Age: 71
LOS: 2 days | Discharge: HOME | DRG: 438 | End: 2021-05-09
Attending: PHYSICIAN ASSISTANT | Admitting: INTERNAL MEDICINE
Payer: COMMERCIAL

## 2021-05-07 DIAGNOSIS — K85.90 ACUTE PANCREATITIS: ICD-10-CM

## 2021-05-07 DIAGNOSIS — E87.29 ALCOHOLIC KETOACIDOSIS: ICD-10-CM

## 2021-05-07 DIAGNOSIS — R11.2 NAUSEA WITH VOMITING: ICD-10-CM

## 2021-05-07 DIAGNOSIS — F10.939 ALCOHOL WITHDRAWAL (H): ICD-10-CM

## 2021-05-07 LAB
ALBUMIN SERPL-MCNC: 4.3 G/DL (ref 3.4–5)
ALP SERPL-CCNC: 95 U/L (ref 40–150)
ALT SERPL W P-5'-P-CCNC: 130 U/L (ref 0–50)
ANION GAP SERPL CALCULATED.3IONS-SCNC: 24 MMOL/L (ref 3–14)
AST SERPL W P-5'-P-CCNC: 130 U/L (ref 0–45)
BASE DEFICIT BLDV-SCNC: 17.7 MMOL/L
BASOPHILS # BLD AUTO: 0.1 10E9/L (ref 0–0.2)
BASOPHILS NFR BLD AUTO: 1 %
BILIRUB SERPL-MCNC: 0.9 MG/DL (ref 0.2–1.3)
BUN SERPL-MCNC: 12 MG/DL (ref 7–30)
CALCIUM SERPL-MCNC: 10 MG/DL (ref 8.5–10.1)
CHLORIDE SERPL-SCNC: 99 MMOL/L (ref 94–109)
CO2 SERPL-SCNC: 13 MMOL/L (ref 20–32)
CREAT SERPL-MCNC: 0.92 MG/DL (ref 0.52–1.04)
DIFFERENTIAL METHOD BLD: ABNORMAL
EOSINOPHIL # BLD AUTO: 0 10E9/L (ref 0–0.7)
EOSINOPHIL NFR BLD AUTO: 0 %
ERYTHROCYTE [DISTWIDTH] IN BLOOD BY AUTOMATED COUNT: 12.6 % (ref 10–15)
GFR SERPL CREATININE-BSD FRML MDRD: 64 ML/MIN/{1.73_M2}
GLUCOSE BLDC GLUCOMTR-MCNC: 88 MG/DL (ref 70–99)
GLUCOSE SERPL-MCNC: 95 MG/DL (ref 70–99)
HCO3 BLDV-SCNC: 9 MMOL/L (ref 21–28)
HCT VFR BLD AUTO: 50.5 % (ref 35–47)
HGB BLD-MCNC: 16.8 G/DL (ref 11.7–15.7)
INR PPP: 0.86 (ref 0.86–1.14)
KETONES BLD-SCNC: 6.3 MMOL/L (ref 0–0.6)
LABORATORY COMMENT REPORT: NORMAL
LACTATE BLD-SCNC: 2.4 MMOL/L (ref 0.7–2)
LIPASE SERPL-CCNC: 7093 U/L (ref 73–393)
LYMPHOCYTES # BLD AUTO: 0.1 10E9/L (ref 0.8–5.3)
LYMPHOCYTES NFR BLD AUTO: 1 %
MAGNESIUM SERPL-MCNC: 1.4 MG/DL (ref 1.6–2.3)
MCH RBC QN AUTO: 35.1 PG (ref 26.5–33)
MCHC RBC AUTO-ENTMCNC: 33.3 G/DL (ref 31.5–36.5)
MCV RBC AUTO: 106 FL (ref 78–100)
MONOCYTES # BLD AUTO: 0.3 10E9/L (ref 0–1.3)
MONOCYTES NFR BLD AUTO: 4 %
NEUTROPHILS # BLD AUTO: 6.3 10E9/L (ref 1.6–8.3)
NEUTROPHILS NFR BLD AUTO: 94 %
O2/TOTAL GAS SETTING VFR VENT: ABNORMAL %
OXYHGB MFR BLDV: 45 %
PCO2 BLDV: 27 MM HG (ref 40–50)
PH BLDV: 7.16 PH (ref 7.32–7.43)
PHOSPHATE SERPL-MCNC: 2.6 MG/DL (ref 2.5–4.5)
PLATELET # BLD AUTO: 215 10E9/L (ref 150–450)
PLATELET # BLD EST: ABNORMAL 10*3/UL
PO2 BLDV: 27 MM HG (ref 25–47)
POTASSIUM SERPL-SCNC: 4.8 MMOL/L (ref 3.4–5.3)
PROT SERPL-MCNC: 8.5 G/DL (ref 6.8–8.8)
RBC # BLD AUTO: 4.78 10E12/L (ref 3.8–5.2)
RBC MORPH BLD: ABNORMAL
SARS-COV-2 RNA RESP QL NAA+PROBE: NEGATIVE
SODIUM SERPL-SCNC: 136 MMOL/L (ref 133–144)
SPECIMEN SOURCE: NORMAL
WBC # BLD AUTO: 6.8 10E9/L (ref 4–11)

## 2021-05-07 PROCEDURE — 120N000001 HC R&B MED SURG/OB

## 2021-05-07 PROCEDURE — 258N000003 HC RX IP 258 OP 636: Performed by: PHYSICIAN ASSISTANT

## 2021-05-07 PROCEDURE — 74177 CT ABD & PELVIS W/CONTRAST: CPT

## 2021-05-07 PROCEDURE — 250N000011 HC RX IP 250 OP 636: Performed by: INTERNAL MEDICINE

## 2021-05-07 PROCEDURE — 83690 ASSAY OF LIPASE: CPT | Performed by: PHYSICIAN ASSISTANT

## 2021-05-07 PROCEDURE — 258N000003 HC RX IP 258 OP 636: Performed by: INTERNAL MEDICINE

## 2021-05-07 PROCEDURE — HZ2ZZZZ DETOXIFICATION SERVICES FOR SUBSTANCE ABUSE TREATMENT: ICD-10-PCS | Performed by: INTERNAL MEDICINE

## 2021-05-07 PROCEDURE — 83735 ASSAY OF MAGNESIUM: CPT | Performed by: INTERNAL MEDICINE

## 2021-05-07 PROCEDURE — 250N000011 HC RX IP 250 OP 636: Performed by: PHYSICIAN ASSISTANT

## 2021-05-07 PROCEDURE — 83605 ASSAY OF LACTIC ACID: CPT | Performed by: PHYSICIAN ASSISTANT

## 2021-05-07 PROCEDURE — 87635 SARS-COV-2 COVID-19 AMP PRB: CPT | Performed by: PHYSICIAN ASSISTANT

## 2021-05-07 PROCEDURE — 36415 COLL VENOUS BLD VENIPUNCTURE: CPT | Performed by: INTERNAL MEDICINE

## 2021-05-07 PROCEDURE — 85025 COMPLETE CBC W/AUTO DIFF WBC: CPT | Performed by: PHYSICIAN ASSISTANT

## 2021-05-07 PROCEDURE — 250N000013 HC RX MED GY IP 250 OP 250 PS 637: Performed by: INTERNAL MEDICINE

## 2021-05-07 PROCEDURE — C9803 HOPD COVID-19 SPEC COLLECT: HCPCS

## 2021-05-07 PROCEDURE — 85610 PROTHROMBIN TIME: CPT | Performed by: PHYSICIAN ASSISTANT

## 2021-05-07 PROCEDURE — 74177 CT ABD & PELVIS W/CONTRAST: CPT | Mod: 26 | Performed by: RADIOLOGY

## 2021-05-07 PROCEDURE — 96365 THER/PROPH/DIAG IV INF INIT: CPT

## 2021-05-07 PROCEDURE — 82805 BLOOD GASES W/O2 SATURATION: CPT | Performed by: PHYSICIAN ASSISTANT

## 2021-05-07 PROCEDURE — 96375 TX/PRO/DX INJ NEW DRUG ADDON: CPT

## 2021-05-07 PROCEDURE — 80053 COMPREHEN METABOLIC PANEL: CPT | Performed by: PHYSICIAN ASSISTANT

## 2021-05-07 PROCEDURE — 82010 KETONE BODYS QUAN: CPT | Performed by: PHYSICIAN ASSISTANT

## 2021-05-07 PROCEDURE — 84100 ASSAY OF PHOSPHORUS: CPT | Performed by: INTERNAL MEDICINE

## 2021-05-07 PROCEDURE — 96361 HYDRATE IV INFUSION ADD-ON: CPT

## 2021-05-07 PROCEDURE — 96366 THER/PROPH/DIAG IV INF ADDON: CPT

## 2021-05-07 PROCEDURE — 99291 CRITICAL CARE FIRST HOUR: CPT | Mod: 25

## 2021-05-07 PROCEDURE — 99223 1ST HOSP IP/OBS HIGH 75: CPT | Mod: AI | Performed by: INTERNAL MEDICINE

## 2021-05-07 PROCEDURE — 999N001017 HC STATISTIC GLUCOSE BY METER IP

## 2021-05-07 RX ORDER — MORPHINE SULFATE 2 MG/ML
2 INJECTION, SOLUTION INTRAMUSCULAR; INTRAVENOUS
Status: DISCONTINUED | OUTPATIENT
Start: 2021-05-07 | End: 2021-05-09 | Stop reason: HOSPADM

## 2021-05-07 RX ORDER — IOPAMIDOL 755 MG/ML
500 INJECTION, SOLUTION INTRAVASCULAR ONCE
Status: COMPLETED | OUTPATIENT
Start: 2021-05-07 | End: 2021-05-07

## 2021-05-07 RX ORDER — HYDRALAZINE HYDROCHLORIDE 20 MG/ML
10 INJECTION INTRAMUSCULAR; INTRAVENOUS EVERY 4 HOURS PRN
Status: DISCONTINUED | OUTPATIENT
Start: 2021-05-07 | End: 2021-05-09 | Stop reason: HOSPADM

## 2021-05-07 RX ORDER — SODIUM CHLORIDE 9 MG/ML
INJECTION, SOLUTION INTRAVENOUS CONTINUOUS
Status: DISCONTINUED | OUTPATIENT
Start: 2021-05-07 | End: 2021-05-08

## 2021-05-07 RX ORDER — LORAZEPAM 2 MG/ML
0.5 INJECTION INTRAMUSCULAR ONCE
Status: COMPLETED | OUTPATIENT
Start: 2021-05-07 | End: 2021-05-07

## 2021-05-07 RX ORDER — LABETALOL HYDROCHLORIDE 5 MG/ML
10 INJECTION, SOLUTION INTRAVENOUS ONCE
Status: COMPLETED | OUTPATIENT
Start: 2021-05-07 | End: 2021-05-07

## 2021-05-07 RX ORDER — NALOXONE HYDROCHLORIDE 0.4 MG/ML
0.4 INJECTION, SOLUTION INTRAMUSCULAR; INTRAVENOUS; SUBCUTANEOUS
Status: DISCONTINUED | OUTPATIENT
Start: 2021-05-07 | End: 2021-05-09 | Stop reason: HOSPADM

## 2021-05-07 RX ORDER — AMOXICILLIN 250 MG
2 CAPSULE ORAL 2 TIMES DAILY PRN
Status: DISCONTINUED | OUTPATIENT
Start: 2021-05-07 | End: 2021-05-09 | Stop reason: HOSPADM

## 2021-05-07 RX ORDER — SODIUM CHLORIDE 9 MG/ML
INJECTION, SOLUTION INTRAVENOUS CONTINUOUS
Status: DISCONTINUED | OUTPATIENT
Start: 2021-05-07 | End: 2021-05-07

## 2021-05-07 RX ORDER — NALOXONE HYDROCHLORIDE 0.4 MG/ML
0.2 INJECTION, SOLUTION INTRAMUSCULAR; INTRAVENOUS; SUBCUTANEOUS
Status: DISCONTINUED | OUTPATIENT
Start: 2021-05-07 | End: 2021-05-09 | Stop reason: HOSPADM

## 2021-05-07 RX ORDER — HEPARIN SODIUM 5000 [USP'U]/.5ML
5000 INJECTION, SOLUTION INTRAVENOUS; SUBCUTANEOUS EVERY 12 HOURS
Status: DISCONTINUED | OUTPATIENT
Start: 2021-05-07 | End: 2021-05-09 | Stop reason: HOSPADM

## 2021-05-07 RX ORDER — LANOLIN ALCOHOL/MO/W.PET/CERES
100 CREAM (GRAM) TOPICAL DAILY
Status: DISCONTINUED | OUTPATIENT
Start: 2021-05-15 | End: 2021-05-09 | Stop reason: HOSPADM

## 2021-05-07 RX ORDER — LANOLIN ALCOHOL/MO/W.PET/CERES
100 CREAM (GRAM) TOPICAL 3 TIMES DAILY
Status: DISCONTINUED | OUTPATIENT
Start: 2021-05-09 | End: 2021-05-09 | Stop reason: HOSPADM

## 2021-05-07 RX ORDER — MULTIPLE VITAMINS W/ MINERALS TAB 9MG-400MCG
1 TAB ORAL DAILY
Status: DISCONTINUED | OUTPATIENT
Start: 2021-05-07 | End: 2021-05-09 | Stop reason: HOSPADM

## 2021-05-07 RX ORDER — THIAMINE HYDROCHLORIDE 100 MG/ML
100 INJECTION, SOLUTION INTRAMUSCULAR; INTRAVENOUS ONCE
Status: COMPLETED | OUTPATIENT
Start: 2021-05-07 | End: 2021-05-07

## 2021-05-07 RX ORDER — ACETAMINOPHEN 325 MG/1
650 TABLET ORAL EVERY 4 HOURS PRN
Status: DISCONTINUED | OUTPATIENT
Start: 2021-05-07 | End: 2021-05-09 | Stop reason: HOSPADM

## 2021-05-07 RX ORDER — ONDANSETRON 2 MG/ML
4 INJECTION INTRAMUSCULAR; INTRAVENOUS ONCE
Status: COMPLETED | OUTPATIENT
Start: 2021-05-07 | End: 2021-05-07

## 2021-05-07 RX ORDER — LISINOPRIL 20 MG/1
20 TABLET ORAL DAILY
COMMUNITY
End: 2024-04-27

## 2021-05-07 RX ORDER — ACETAMINOPHEN 650 MG/1
650 SUPPOSITORY RECTAL EVERY 4 HOURS PRN
Status: DISCONTINUED | OUTPATIENT
Start: 2021-05-07 | End: 2021-05-09 | Stop reason: HOSPADM

## 2021-05-07 RX ORDER — LORAZEPAM 2 MG/ML
1 INJECTION INTRAMUSCULAR ONCE
Status: COMPLETED | OUTPATIENT
Start: 2021-05-07 | End: 2021-05-07

## 2021-05-07 RX ORDER — POLYETHYLENE GLYCOL 3350 17 G/17G
17 POWDER, FOR SOLUTION ORAL DAILY PRN
Status: DISCONTINUED | OUTPATIENT
Start: 2021-05-07 | End: 2021-05-09 | Stop reason: HOSPADM

## 2021-05-07 RX ORDER — FLUMAZENIL 0.1 MG/ML
0.2 INJECTION, SOLUTION INTRAVENOUS
Status: DISCONTINUED | OUTPATIENT
Start: 2021-05-07 | End: 2021-05-09 | Stop reason: HOSPADM

## 2021-05-07 RX ORDER — LIDOCAINE 40 MG/G
CREAM TOPICAL
Status: DISCONTINUED | OUTPATIENT
Start: 2021-05-07 | End: 2021-05-09 | Stop reason: HOSPADM

## 2021-05-07 RX ORDER — OLANZAPINE 5 MG/1
5-10 TABLET, ORALLY DISINTEGRATING ORAL EVERY 6 HOURS PRN
Status: DISCONTINUED | OUTPATIENT
Start: 2021-05-07 | End: 2021-05-09 | Stop reason: HOSPADM

## 2021-05-07 RX ORDER — FOLIC ACID 1 MG/1
1 TABLET ORAL DAILY
Status: DISCONTINUED | OUTPATIENT
Start: 2021-05-07 | End: 2021-05-09 | Stop reason: HOSPADM

## 2021-05-07 RX ORDER — LORAZEPAM 1 MG/1
1-2 TABLET ORAL EVERY 30 MIN PRN
Status: DISCONTINUED | OUTPATIENT
Start: 2021-05-07 | End: 2021-05-09 | Stop reason: HOSPADM

## 2021-05-07 RX ORDER — LISINOPRIL 5 MG/1
5 TABLET ORAL DAILY
Status: DISCONTINUED | OUTPATIENT
Start: 2021-05-08 | End: 2021-05-09 | Stop reason: HOSPADM

## 2021-05-07 RX ORDER — ONDANSETRON 2 MG/ML
4 INJECTION INTRAMUSCULAR; INTRAVENOUS EVERY 6 HOURS PRN
Status: DISCONTINUED | OUTPATIENT
Start: 2021-05-07 | End: 2021-05-09 | Stop reason: HOSPADM

## 2021-05-07 RX ORDER — ATENOLOL 50 MG/1
50 TABLET ORAL DAILY
Status: DISCONTINUED | OUTPATIENT
Start: 2021-05-08 | End: 2021-05-09 | Stop reason: HOSPADM

## 2021-05-07 RX ORDER — ONDANSETRON 4 MG/1
4 TABLET, ORALLY DISINTEGRATING ORAL EVERY 6 HOURS PRN
Status: DISCONTINUED | OUTPATIENT
Start: 2021-05-07 | End: 2021-05-09 | Stop reason: HOSPADM

## 2021-05-07 RX ORDER — LANOLIN ALCOHOL/MO/W.PET/CERES
200 CREAM (GRAM) TOPICAL 3 TIMES DAILY
Status: DISCONTINUED | OUTPATIENT
Start: 2021-05-07 | End: 2021-05-09 | Stop reason: HOSPADM

## 2021-05-07 RX ORDER — AMOXICILLIN 250 MG
1 CAPSULE ORAL 2 TIMES DAILY PRN
Status: DISCONTINUED | OUTPATIENT
Start: 2021-05-07 | End: 2021-05-09 | Stop reason: HOSPADM

## 2021-05-07 RX ORDER — HALOPERIDOL 5 MG/ML
2.5-5 INJECTION INTRAMUSCULAR EVERY 6 HOURS PRN
Status: DISCONTINUED | OUTPATIENT
Start: 2021-05-07 | End: 2021-05-09 | Stop reason: HOSPADM

## 2021-05-07 RX ORDER — LORAZEPAM 2 MG/ML
1-2 INJECTION INTRAMUSCULAR EVERY 30 MIN PRN
Status: DISCONTINUED | OUTPATIENT
Start: 2021-05-07 | End: 2021-05-09 | Stop reason: HOSPADM

## 2021-05-07 RX ADMIN — Medication 5 MG: at 22:36

## 2021-05-07 RX ADMIN — LORAZEPAM 1 MG: 2 INJECTION INTRAMUSCULAR; INTRAVENOUS at 22:43

## 2021-05-07 RX ADMIN — HYDRALAZINE HYDROCHLORIDE 10 MG: 20 INJECTION INTRAMUSCULAR; INTRAVENOUS at 20:06

## 2021-05-07 RX ADMIN — THIAMINE HYDROCHLORIDE 100 MG: 100 INJECTION, SOLUTION INTRAMUSCULAR; INTRAVENOUS at 17:33

## 2021-05-07 RX ADMIN — ONDANSETRON 4 MG: 2 INJECTION INTRAMUSCULAR; INTRAVENOUS at 16:32

## 2021-05-07 RX ADMIN — SODIUM CHLORIDE 1000 ML: 9 INJECTION, SOLUTION INTRAVENOUS at 17:28

## 2021-05-07 RX ADMIN — LORAZEPAM 1 MG: 2 INJECTION INTRAMUSCULAR; INTRAVENOUS at 17:32

## 2021-05-07 RX ADMIN — SODIUM CHLORIDE 1000 ML: 9 INJECTION, SOLUTION INTRAVENOUS at 16:23

## 2021-05-07 RX ADMIN — IOPAMIDOL 73 ML: 755 INJECTION, SOLUTION INTRAVENOUS at 18:45

## 2021-05-07 RX ADMIN — HEPARIN SODIUM 5000 UNITS: 5000 INJECTION INTRAVENOUS; SUBCUTANEOUS at 20:40

## 2021-05-07 RX ADMIN — LABETALOL HYDROCHLORIDE 10 MG: 5 INJECTION, SOLUTION INTRAVENOUS at 17:43

## 2021-05-07 RX ADMIN — SODIUM CHLORIDE 1000 ML: 9 INJECTION, SOLUTION INTRAVENOUS at 20:07

## 2021-05-07 RX ADMIN — ONDANSETRON 4 MG: 2 INJECTION INTRAMUSCULAR; INTRAVENOUS at 21:06

## 2021-05-07 RX ADMIN — DEXTROSE AND SODIUM CHLORIDE: 5; 900 INJECTION, SOLUTION INTRAVENOUS at 17:44

## 2021-05-07 RX ADMIN — LORAZEPAM 0.5 MG: 2 INJECTION INTRAMUSCULAR; INTRAVENOUS at 16:33

## 2021-05-07 ASSESSMENT — ACTIVITIES OF DAILY LIVING (ADL): ADLS_ACUITY_SCORE: 13

## 2021-05-07 ASSESSMENT — MIFFLIN-ST. JEOR: SCORE: 1179.06

## 2021-05-07 NOTE — ED TRIAGE NOTES
"Patient states she has had vomiting and nausea for one week, and \"a little\" diarrhea. Patient states she has not been able to eat for the last day and a half. The patient says she has pain in the lower quadrants. Pt has hx of stomach ulcer- \"from taking too much Aleve.\" The patient states she has not taken Aleve for years, but rather takes Tylenol.  "

## 2021-05-07 NOTE — PHARMACY-ADMISSION MEDICATION HISTORY
Admission medication history interview status for this patient is complete. See [facility] admission navigator for allergy information, prior to admission medications and immunization status.     Medication history interview done, indicate source(s): Patient  Medication history resources (including written lists, pill bottles, clinic record): SureScripts and Care Everywhere  Pharmacy: [facility] [address] [place]. [facility] for discharge    Changes made to PTA medication list:  Added: magnesium  Deleted: gabapentin  Changed: lisinopril 30mg daily --> 5mg daily    Actions taken by pharmacist (provider contacted, etc): Spoke with patient about home med list     Additional medication history information: None    Medication reconciliation/reorder completed by provider prior to medication history?  N   (Y/N)     Prior to Admission medications    Medication Sig Last Dose Taking? Auth Provider   acetaminophen (TYLENOL) 325 MG tablet Take 325-650 mg by mouth every 8 hours as needed for mild pain 5/5/2021 at pm Yes Unknown, Entered By History   atenolol (TENORMIN) 50 MG tablet Take 50 mg by mouth daily  5/5/2021 at am Yes Reported, Patient   calcium carbonate (OS-KHUSHI) 500 MG tablet Take 1 tablet by mouth daily Past Month at Unknown time Yes Unknown, Entered By History   folic acid (FOLVITE) 1 MG tablet Take 1 tablet (1 mg) by mouth daily Past Month at Unknown time Yes Justin Roblero MD   lisinopril (ZESTRIL) 5 MG tablet Take 5 mg by mouth daily 5/5/2021 at am Yes Unknown, Entered By History   Magnesium Oxide 250 MG TABS Take 250 mg by mouth daily Past Month at Unknown time Yes Unknown, Entered By History   vitamin B1 (THIAMINE) 100 MG tablet Take 1 tablet (100 mg) by mouth daily Past Month at Unknown time Yes Justin Roblero MD

## 2021-05-07 NOTE — H&P
[facility]  History and Physical  Hospitalist - Waqas Garcia DO       Date of Admission:  5/7/2021    Chief Complaint   Abdominal pain, nausea, and vomiting    History is obtained from the patient as well as the emergency department personnel.    History of Present Illness   Rizwana Aguilar is a 70 year old female with past medical history of alcohol abuse with alcohol dependence, alcoholic pancreatitis, possible alcoholic liver cirrhosis, hypertension who presented on 5/7/2021 with chief complaint of abdominal pain, nausea, and vomiting.  The patient states approximately 3 days ago she developed diffuse abdominal pain.  She states it is constant in nature.  She reports that she developed nausea and vomiting approximately 2 days ago and has been unable to keep down food for the last 2 days.  She also reports she has been unable to take her daily medications because of this.  She states she was drinking 2 glasses of vodka per day with her last drink on 5/6/2021.  She denies any history of alcohol withdrawal or alcohol withdrawal seizures.  She states she is recently been trying to cut down on her alcohol.  She currently lives with her  at home.  The patient was admitted in 1/2021 for similar issue and found to have alcoholic pancreatitis at that time.    In the emergency department the patient was found to have a lipase of 7093, bicarb 13, anion gap 24, ketone quantitative 6.3, lactic acid 2.4, AST//130, creatinine 0.92.  Secondary to the patient's abdominal pain and elevated lipase a CT abdomen and pelvis was obtained.    ASSESSMENT/PLAN    1.  Acute Intractable Abdominal Pain, Nausea, and Vomiting  - Likely secondary to etoh abuse and pancreatitis  - Zofran prn    2.  Acute Alcoholic Pancreatitis  - CT abd/pelv pending  - IVF - 0.9% @ 150/hr  - Pain control  - Clear Liquid Diet    3.  Severe Alcohol Abuse with Impending Alcohol Withdrawal  - CIWA  - MV, FA, Thiamine  - IVF - 0.9% @  150/hr  - Discussed the importance of stopping alcohol use    4.  Anion Gap Metabolic Acidosis  - Likely secondary to starvation ketosis, alcoholic ketosis, and vomiting  - IVF as above  - Repeat LA in AM  - Daily BMP    5.  Acute Kidney Injury  - Baseline Cr = 0.4-0.5  - IVF  - Daily BMP    6.  Polycythemia  - Possibly secondary to dehydration  - IVF  - Daily CBC    7.  Liver Disease - possible alcoholic liver cirrhosis  - CT abd/pelv pending  - Per pt, follows with GI as outpatient though no notes in chart  - Daily hepatic panel    8.  Hypertension  - Resume antihypertensives once confirmed by pharmacy    Chronic Medical Problems:  GERD  IBS  Hx of Nicotine Dependence with Cigarettes - Quit >20 yrs ago    PLAN: Resume home medications as appropriate once confirmed by pharmacy.     DVT Prophylaxis: Heparin SQ  Code Status: Full Code  Discharge Plan: Expected discharge: 2 - 3 days, recommended to prior living arrangement once adequate pain management/ tolerating PO medications.      Waqas Garica DO    Primary Care Physician   FELIPE GODOY    -----------------------------------------------------------------------------------------------------------------------------------------------------------------------------------------------------    Past Medical History    I have reviewed this patient's medical history and updated it with pertinent information if needed.   Past Medical History:   Diagnosis Date     Hypertension    alcohol abuse with alcohol dependence, alcoholic pancreatitis, possible alcoholic liver cirrhosis    Past Surgical History   I have reviewed this patient's surgical history and updated it with pertinent information if needed.  Past Surgical History:   Procedure Laterality Date     GYN SURGERY       OVARY REMOVAL 1/1/2001 - 12/31/2001        CATARACT REMOVAL 5/10/17 Right Zcb00 22.0 Target: plano ABW     CATARACT REMOVAL 06/21/2017 Left ZCB00, 22.0, wiliam, ANNALISA     BREAST BIOPSY 08/20/2018 Left  invasive ductal ca     BREAST LUMPECTOMY 09/12/2018 Left ca           Prior to Admission Medications   Prior to Admission Medications   Prescriptions Last Dose Informant Patient Reported? Taking?   Magnesium Oxide 250 MG TABS Past Month at Unknown time  Yes Yes   Sig: Take 250 mg by mouth daily   acetaminophen (TYLENOL) 325 MG tablet 5/5/2021 at pm  Yes Yes   Sig: Take 325-650 mg by mouth every 8 hours as needed for mild pain   atenolol (TENORMIN) 50 MG tablet 5/5/2021 at am  Yes Yes   Sig: Take 50 mg by mouth daily    calcium carbonate (OS-KHUSHI) 500 MG tablet Past Month at Unknown time  Yes Yes   Sig: Take 1 tablet by mouth daily   folic acid (FOLVITE) 1 MG tablet Past Month at Unknown time  No Yes   Sig: Take 1 tablet (1 mg) by mouth daily   lisinopril (ZESTRIL) 5 MG tablet 5/5/2021 at am  Yes Yes   Sig: Take 5 mg by mouth daily   vitamin B1 (THIAMINE) 100 MG tablet Past Month at Unknown time  No Yes   Sig: Take 1 tablet (100 mg) by mouth daily      Facility-Administered Medications: None     Allergies   Allergies   Allergen Reactions     Penicillins Unknown       Social History   I have reviewed this patient's social history and updated it with pertinent information if needed. Rizwana Aguilar reports hx of tobacco use with cigarettes.  Quit over 20 years ago.  Reports daily alcohol use.  2 drinks daily with Vodka  Former Smoker Cigarettes 1 30 Quit: 12/08/2004       Family History   I have reviewed this patient's family history and updated it with pertinent information if  needed.   Father had Alzheimers Dementia  Cataract Birth Father       High Blood Pressure Birth Father       Cataract Birth Mother       High Blood Pressure Birth Mother       Cancer, Breast Daughter       Cancer, Breast Paternal Aunt         -----------------------------------------------------------------------------------------------------------------------------------------------------------------------------------------------------    Review of  Systems   The 10 point Review of Systems is negative other than noted in the HPI or here.     Physical Exam   Temp: 97  F (36.1  C) Temp src: Temporal BP: (!) 166/99 Pulse: 112   Resp: 17 SpO2: 100 % O2 Device: None (Room air)    Vital Signs with Ranges  Temp:  [97  F (36.1  C)] 97  F (36.1  C)  Pulse:  [112-137] 112  Resp:  [15-21] 17  BP: (144-187)/() 166/99  SpO2:  [99 %-100 %] 100 %  145 lbs 0 oz    Constitutional: Awake, alert, cooperative, no apparent distress.  Eyes: Conjunctiva and pupils examined and normal.  HEENT: Moist mucous membranes, normal dentition.  Respiratory: Clear to auscultation bilaterally, no crackles or wheezing.  Cardiovascular: Regular rate and rhythm, normal S1 and S2, and no murmur noted.  GI: Soft, non-distended, non-tender, normal bowel sounds.  Lymph/Hematologic: No anterior cervical or supraclavicular adenopathy.  Skin: No rashes, no cyanosis, no edema.  Musculoskeletal: No joint swelling, erythema or tenderness.  Neurologic: Cranial nerves 2-12 intact, normal strength and sensation, tremulous.  Psychiatric: Alert, oriented to person, place and time, no obvious anxiety or depression.     Data     Recent Labs   Lab 05/07/21  1621   WBC 6.8   HGB 16.8*   *      INR 0.86      POTASSIUM 4.8   CHLORIDE 99   CO2 13*   BUN 12   CR 0.92   ANIONGAP 24*   KHUSHI 10.0   GLC 95   ALBUMIN 4.3   PROTTOTAL 8.5   BILITOTAL 0.9   ALKPHOS 95   *   *   LIPASE 7,093*       No results found for this or any previous visit (from the past 24 hour(s)).

## 2021-05-07 NOTE — ED NOTES
DATE:  5/7/2021   TIME OF RECEIPT FROM LAB:  8509  LAB TEST:  PH, HCO3  LAB VALUE:  HCO3: 9, pH: 7.16  RESULTS GIVEN WITH READ-BACK TO (PROVIDER):  Beny Mark*  TIME LAB VALUE REPORTED TO PROVIDER:   1727

## 2021-05-07 NOTE — ED NOTES
"[facility]  ED Nurse Handoff Report    Rizwana Aguilar is a 70 year old female   ED Chief complaint: Vomiting  . ED Diagnosis:   Final diagnoses:   Alcoholic ketoacidosis   Alcohol withdrawal (H)   Nausea with vomiting   Acute pancreatitis     Allergies:   Allergies   Allergen Reactions     Penicillins Unknown       Code Status: Full Code  Activity level - Baseline/Home:  Independent. Activity Level - Current:   Stand by Assist. Lift room needed: No. Bariatric: No   Needed: No   Isolation: No. Infection: Not Applicable.     Vital Signs:   Vitals:    05/07/21 1735 05/07/21 1740 05/07/21 1745 05/07/21 1750   BP:   (!) 172/93 (!) 144/93   Pulse: 137 137 135 115   Resp: 16 21 17 19   Temp:       TempSrc:       SpO2: 100% 99% 99% 100%   Weight:           Cardiac Rhythm:  ,      Pain level:    Patient confused: No. Patient Falls Risk: Yes.   Elimination Status: Has voided   Patient Report - Initial Complaint:   ED Triage Notes Filed Patient states she has had vomiting and nausea for one week, and \"a little\" diarrhea. Patient states she has not been able to eat for the last day and a half. The patient says she has pain in the lower quadrants. Pt has hx of stomach ulcer- \"from taking too much Aleve.\" The patient states she has not taken Aleve for years, but rather takes Tylenol.     . Focused Assessment:   17:00 Cardiac Cardiac - Cardiac WDL: .WDL except; rhythm  Cardiac Rhythm: tachycardic   Cardiac Monitoring - EKG Monitoring: Yes  Cardiac Regularity: Regular  Cardiac Rhythm: ST  MN      17:00 Gastrointestinal Gastrointestinal - Gastrointestinal WDL: .WDL except; GI symptoms; nausea and vomiting  GI Signs/Symptoms: diarrhea; nausea; vomiting  MN     17:00 Skin Color/Condition Skin - Skin WDL: .WDL except  Skin Moisture: dry,flaky  Skin Turgor: slow return to original state  MN     17:00 Neurological Cognitive - Cognitive/Neuro/Behavioral WDL: WDL   [place] Coma Scale - Best Eye Response: 4-->(E4) " spontaneous  Best Motor Response: 6-->(M6) obeys commands  Best Verbal Response: 5-->(V5) oriented  [place] Coma Scale Score: 15          Tests Performed: labs, imaging. Abnormal Results:   Labs Ordered and Resulted from Time of ED Arrival Up to the Time of Departure from the ED   CBC WITH PLATELETS DIFFERENTIAL - Abnormal; Notable for the following components:       Result Value    Hemoglobin 16.8 (*)     Hematocrit 50.5 (*)      (*)     MCH 35.1 (*)     Absolute Lymphocytes 0.1 (*)     All other components within normal limits   COMPREHENSIVE METABOLIC PANEL - Abnormal; Notable for the following components:    Carbon Dioxide 13 (*)     Anion Gap 24 (*)      (*)      (*)     All other components within normal limits   LIPASE - Abnormal; Notable for the following components:    Lipase 7,093 (*)     All other components within normal limits   BLOOD GAS VENOUS AND OXYHGB - Abnormal; Notable for the following components:    Ph Venous 7.16 (*)     PCO2 Venous 27 (*)     Bicarbonate Venous 9 (*)     All other components within normal limits   KETONE BETA-HYDROXYBUTYRATE QUANTITATIVE - Abnormal; Notable for the following components:    Ketone Quantitative 6.3 (*)     All other components within normal limits   LACTIC ACID WHOLE BLOOD - Abnormal; Notable for the following components:    Lactic Acid 2.4 (*)     All other components within normal limits   INR   SARS-COV-2 (COVID-19) VIRUS RT-PCR   GLUCOSE BY METER     .   Treatments provided: see MAR  Family Comments:  was at bedside, has gone home  OBS brochure/video discussed/provided to patient:  N/A  ED Medications:   Medications   0.9% sodium chloride BOLUS (1,000 mLs Intravenous New Bag 5/7/21 1623)     Followed by   0.9% sodium chloride BOLUS (1,000 mLs Intravenous New Bag 5/7/21 1728)     Followed by   sodium chloride 0.9% infusion (has no administration in time range)   dextrose 5% and 0.9% NaCl infusion ( Intravenous New Bag 5/7/21 6259)    ondansetron (ZOFRAN) injection 4 mg (4 mg Intravenous Given 5/7/21 1632)   LORazepam (ATIVAN) injection 0.5 mg (0.5 mg Intravenous Given 5/7/21 1633)   thiamine (B-1) injection 100 mg (100 mg Intravenous Given 5/7/21 1733)   LORazepam (ATIVAN) injection 1 mg (1 mg Intravenous Given 5/7/21 1732)   labetalol (NORMODYNE/TRANDATE) injection 10 mg (10 mg Intravenous Given 5/7/21 1743)     Drips infusing:  No  For the majority of the shift, the patient's behavior Green. Interventions performed were N/A.    Sepsis treatment initiated: No     Patient tested for COVID 19 prior to admission: YES    ED Nurse Name/Phone Number: Sari Jay RN,   6:02 PM    RECEIVING UNIT ED HANDOFF REVIEW    Above ED Nurse Handoff Report was reviewed: Yes  Reviewed by: Rodríguez Goins RN on May 7, 2021 at 6:16 PM

## 2021-05-07 NOTE — ED PROVIDER NOTES
History   Chief Complaint:  Vomiting       HPI   Rizwana Aguilar is a 70 year old female with history of alcohol abuse, alcohol withdrawal seizures who presents with nausea/vomiting/diarrhea/abdominal pain. She notes the onset of generalized abdominal pain. Pain is constant, aching, feels like her usual ulcer pain but worse, 7-8/10. She notes having 2-3 episodes of loose stool/diarrhea a day. Denies dark/bloody stool/green stools/discolored stools. Today she began to have vomiting at least 3 episodes. Denies coffee ground emesis. She has been unable to take medication or drink alcohol today. She typically drinks multiple alcoholic beverages, hard liquor 3+ drinks, daily.   Denies fever, sweats, sore throat, coughing, wheezing, chest pain, SOB, urinary complaints, potential exposure to contaminated foods, sick contacts, recent travel.    Review of Systems  See HPI    Allergies:  Penicillins    Medications:  Prilosec   Tenormin  Zestril    Past Medical History:    Hypertension  Alcohol withdrawal  Depressive disorder  Alcohol dependence  Alcoholic cirrhosis of liver  IBS  GERD  Liver disease    Past Surgical History:    Ovary removal  Cataract removal  Breast biopsy  Breast lumpectomy     Family History:    Cataracts  Hypertension    Social History:  Patient presents with spouse  ETOH use over 3 drinks daily  Denies illicit drug use    Physical Exam     Patient Vitals for the past 24 hrs:   BP Temp Temp src Pulse Resp SpO2 Weight   05/07/21 1442 (!) 183/106 97  F (36.1  C) Temporal 125 18 99 % 65.8 kg (145 lb)     Physical Exam  Vitals signs and nursing note reviewed.   Constitutional:       Appearance: She is not diaphoretic.   HENT:      Head: Normocephalic and atraumatic.   Eyes:      General: No scleral icterus.     Extraocular Movements: Extraocular movements intact.      Pupils: Pupils are equal, round, and reactive to light.   Cardiovascular:      Rate and Rhythm: Regular rhythm. Tachycardia present.      Heart  sounds: Normal heart sounds.   Pulmonary:      Effort: Pulmonary effort is normal. No respiratory distress.      Breath sounds: Normal breath sounds.   Abdominal:      General: Bowel sounds are normal.      Palpations: Abdomen is soft.      Tenderness: There is no abdominal tenderness.   Musculoskeletal:         General: No tenderness.   Skin:     General: Skin is warm.      Findings: No rash.   Neurological:      Mental Status: She is alert.      Comments: Tremulous       Emergency Department Course     Imaging:    CT Abdomen Pelvis w Contrast   Final Result   IMPRESSION:    1.  Acute edematous interstitial pancreatitis.   2.  Hepatic steatosis.   3.  Osseous demineralization with a remote L1 compression fracture. Recommend correlation with DEXA scan, if not previously performed.        Laboratory:    Labs Ordered and Resulted from Time of ED Arrival Up to the Time of Departure from the ED   CBC WITH PLATELETS DIFFERENTIAL - Abnormal; Notable for the following components:       Result Value    Hemoglobin 16.8 (*)     Hematocrit 50.5 (*)      (*)     MCH 35.1 (*)     Absolute Lymphocytes 0.1 (*)     All other components within normal limits   COMPREHENSIVE METABOLIC PANEL - Abnormal; Notable for the following components:    Carbon Dioxide 13 (*)     Anion Gap 24 (*)      (*)      (*)     All other components within normal limits   LIPASE - Abnormal; Notable for the following components:    Lipase 7,093 (*)     All other components within normal limits   BLOOD GAS VENOUS AND OXYHGB - Abnormal; Notable for the following components:    Ph Venous 7.16 (*)     PCO2 Venous 27 (*)     Bicarbonate Venous 9 (*)     All other components within normal limits   KETONE BETA-HYDROXYBUTYRATE QUANTITATIVE - Abnormal; Notable for the following components:    Ketone Quantitative 6.3 (*)     All other components within normal limits   LACTIC ACID WHOLE BLOOD - Abnormal; Notable for the following components:     Lactic Acid 2.4 (*)     All other components within normal limits   INR   SARS-COV-2 (COVID-19) VIRUS RT-PCR   GLUCOSE BY METER     Emergency Department Course:    Reviewed:  I reviewed the patient's nursing notes, vitals, past medical records, Care Everywhere.     Assessments:      I evaluated the patient and performed an exam as above.        I updated the patient on results and discussed plan of care.    Consults:       I spoke with Dr. Garcia of the hospitalist service regarding patient's presentation, findings, and plan of care.    Interventions:  Medications   0.9% sodium chloride BOLUS (0 mLs Intravenous Stopped 5/7/21 1728)     Followed by   0.9% sodium chloride BOLUS (1,000 mLs Intravenous New Bag 5/7/21 1728)     Followed by   sodium chloride 0.9% infusion (has no administration in time range)   dextrose 5% and 0.9% NaCl infusion ( Intravenous New Bag 5/7/21 1744)   sodium chloride (PF) 0.9% PF flush 100 mL (has no administration in time range)   iopamidol (ISOVUE-370) solution 500 mL (has no administration in time range)   ondansetron (ZOFRAN) injection 4 mg (4 mg Intravenous Given 5/7/21 1632)   LORazepam (ATIVAN) injection 0.5 mg (0.5 mg Intravenous Given 5/7/21 1633)   thiamine (B-1) injection 100 mg (100 mg Intravenous Given 5/7/21 1733)   LORazepam (ATIVAN) injection 1 mg (1 mg Intravenous Given 5/7/21 1732)   labetalol (NORMODYNE/TRANDATE) injection 10 mg (10 mg Intravenous Given 5/7/21 1743)       Disposition:  The patient was admitted to the hospital under the care of hospitalist team.     Impression & Plan     Medical Decision Making:  She presents for evaluation of multiple symptoms. She initially presents hypertensive/tachycardic and dehydrated. There are physical exam changes of alcohol withdrawal as well complicating the matter. There is consideration her cessation of Atenolol 2/2 PO intolerance may be impacting her vital signs. Volume resuscitation occurred while obtaining diagnostic  labs.  She demonstrated changes of alcohol ketoacidosis prompting Thiamine and D5 normal saline after initial normal saline bolus. Ativan was administered repeatedly to assist with her symptoms of alcohol withdrawal. She is noted to demonstrate alcohol induced pancreatitis as well likely accounting for her pain.  Given her being a poor historian, potential for alternative diagnosis, CT imaging obtained to ensure no surgical emergency is demonstrated.      Critical Care Time: was 0 minutes for this patient excluding procedures    Covid-19  Rizwana Aguilar was evaluated during a global COVID-19 pandemic, which necessitated consideration that the patient might be at risk for infection with the SARS-CoV-2 virus that causes COVID-19.   Applicable protocols for evaluation were followed during the patient's care.   COVID-19 was considered as part of the patient's evaluation. The plan for testing is:  a test was obtained during this visit.    Diagnosis:    ICD-10-CM    1. Alcoholic ketoacidosis  E87.2 CBC with platelets differential     Lipase   2. Alcohol withdrawal (H)  F10.239    3. Nausea with vomiting  R11.2    4. Acute pancreatitis  K85.90      Scribe Disclosure:  Zainab PEREZ, am serving as a scribe at 3:39 PM on 5/7/2021 to document services personally performed by Beny Mark based on my observations and the provider's statements to me.      Beny Mark PA-C  05/07/21 2013

## 2021-05-07 NOTE — ED NOTES
DATE:  5/7/2021   TIME OF RECEIPT FROM LAB:  1702  LAB TEST:  ketone  LAB VALUE:  6.3  RESULTS GIVEN WITH READ-BACK TO (PROVIDER):  Beny Mark*  TIME LAB VALUE REPORTED TO PROVIDER:   0600

## 2021-05-08 LAB
ALBUMIN SERPL-MCNC: 3.4 G/DL (ref 3.4–5)
ALP SERPL-CCNC: 79 U/L (ref 40–150)
ALT SERPL W P-5'-P-CCNC: 97 U/L (ref 0–50)
ANION GAP SERPL CALCULATED.3IONS-SCNC: 12 MMOL/L (ref 3–14)
ANION GAP SERPL CALCULATED.3IONS-SCNC: 16 MMOL/L (ref 3–14)
AST SERPL W P-5'-P-CCNC: 95 U/L (ref 0–45)
BASE DEFICIT BLDV-SCNC: 8 MMOL/L
BILIRUB DIRECT SERPL-MCNC: 0.3 MG/DL (ref 0–0.2)
BILIRUB SERPL-MCNC: 0.8 MG/DL (ref 0.2–1.3)
BUN SERPL-MCNC: 5 MG/DL (ref 7–30)
BUN SERPL-MCNC: 8 MG/DL (ref 7–30)
CALCIUM SERPL-MCNC: 8.1 MG/DL (ref 8.5–10.1)
CALCIUM SERPL-MCNC: 8.4 MG/DL (ref 8.5–10.1)
CHLORIDE SERPL-SCNC: 110 MMOL/L (ref 94–109)
CHLORIDE SERPL-SCNC: 110 MMOL/L (ref 94–109)
CO2 SERPL-SCNC: 13 MMOL/L (ref 20–32)
CO2 SERPL-SCNC: 16 MMOL/L (ref 20–32)
CREAT SERPL-MCNC: 0.54 MG/DL (ref 0.52–1.04)
CREAT SERPL-MCNC: 0.71 MG/DL (ref 0.52–1.04)
ERYTHROCYTE [DISTWIDTH] IN BLOOD BY AUTOMATED COUNT: 12.5 % (ref 10–15)
GFR SERPL CREATININE-BSD FRML MDRD: 87 ML/MIN/{1.73_M2}
GFR SERPL CREATININE-BSD FRML MDRD: >90 ML/MIN/{1.73_M2}
GLUCOSE SERPL-MCNC: 142 MG/DL (ref 70–99)
GLUCOSE SERPL-MCNC: 85 MG/DL (ref 70–99)
HCO3 BLDV-SCNC: 15 MMOL/L (ref 21–28)
HCT VFR BLD AUTO: 42.3 % (ref 35–47)
HGB BLD-MCNC: 14.3 G/DL (ref 11.7–15.7)
KETONES BLD-SCNC: 5.8 MMOL/L (ref 0–0.6)
LACTATE BLD-SCNC: 1.2 MMOL/L (ref 0.7–2)
LIPASE SERPL-CCNC: 2411 U/L (ref 73–393)
MAGNESIUM SERPL-MCNC: 1.4 MG/DL (ref 1.6–2.3)
MCH RBC QN AUTO: 35.3 PG (ref 26.5–33)
MCHC RBC AUTO-ENTMCNC: 33.8 G/DL (ref 31.5–36.5)
MCV RBC AUTO: 104 FL (ref 78–100)
O2/TOTAL GAS SETTING VFR VENT: ABNORMAL %
OXYHGB MFR BLDV: 90 %
PCO2 BLDV: 26 MM HG (ref 40–50)
PH BLDV: 7.38 PH (ref 7.32–7.43)
PHOSPHATE SERPL-MCNC: 1.2 MG/DL (ref 2.5–4.5)
PLATELET # BLD AUTO: 135 10E9/L (ref 150–450)
PO2 BLDV: 52 MM HG (ref 25–47)
POTASSIUM SERPL-SCNC: 3.1 MMOL/L (ref 3.4–5.3)
POTASSIUM SERPL-SCNC: 3.2 MMOL/L (ref 3.4–5.3)
POTASSIUM SERPL-SCNC: 3.9 MMOL/L (ref 3.4–5.3)
PROT SERPL-MCNC: 7 G/DL (ref 6.8–8.8)
RBC # BLD AUTO: 4.05 10E12/L (ref 3.8–5.2)
SODIUM SERPL-SCNC: 138 MMOL/L (ref 133–144)
SODIUM SERPL-SCNC: 139 MMOL/L (ref 133–144)
WBC # BLD AUTO: 7.5 10E9/L (ref 4–11)

## 2021-05-08 PROCEDURE — 80048 BASIC METABOLIC PNL TOTAL CA: CPT | Performed by: INTERNAL MEDICINE

## 2021-05-08 PROCEDURE — 250N000013 HC RX MED GY IP 250 OP 250 PS 637: Performed by: HOSPITALIST

## 2021-05-08 PROCEDURE — 83735 ASSAY OF MAGNESIUM: CPT | Performed by: HOSPITALIST

## 2021-05-08 PROCEDURE — 85027 COMPLETE CBC AUTOMATED: CPT | Performed by: INTERNAL MEDICINE

## 2021-05-08 PROCEDURE — 83605 ASSAY OF LACTIC ACID: CPT | Performed by: INTERNAL MEDICINE

## 2021-05-08 PROCEDURE — 258N000003 HC RX IP 258 OP 636: Performed by: INTERNAL MEDICINE

## 2021-05-08 PROCEDURE — 84132 ASSAY OF SERUM POTASSIUM: CPT | Performed by: INTERNAL MEDICINE

## 2021-05-08 PROCEDURE — 99233 SBSQ HOSP IP/OBS HIGH 50: CPT | Performed by: HOSPITALIST

## 2021-05-08 PROCEDURE — 80076 HEPATIC FUNCTION PANEL: CPT | Performed by: INTERNAL MEDICINE

## 2021-05-08 PROCEDURE — 250N000011 HC RX IP 250 OP 636: Performed by: INTERNAL MEDICINE

## 2021-05-08 PROCEDURE — 250N000013 HC RX MED GY IP 250 OP 250 PS 637: Performed by: INTERNAL MEDICINE

## 2021-05-08 PROCEDURE — 258N000003 HC RX IP 258 OP 636: Performed by: HOSPITALIST

## 2021-05-08 PROCEDURE — 83690 ASSAY OF LIPASE: CPT | Performed by: INTERNAL MEDICINE

## 2021-05-08 PROCEDURE — 84100 ASSAY OF PHOSPHORUS: CPT | Performed by: HOSPITALIST

## 2021-05-08 PROCEDURE — 36415 COLL VENOUS BLD VENIPUNCTURE: CPT | Performed by: HOSPITALIST

## 2021-05-08 PROCEDURE — 82010 KETONE BODYS QUAN: CPT | Performed by: INTERNAL MEDICINE

## 2021-05-08 PROCEDURE — 82805 BLOOD GASES W/O2 SATURATION: CPT | Performed by: HOSPITALIST

## 2021-05-08 PROCEDURE — 120N000001 HC R&B MED SURG/OB

## 2021-05-08 PROCEDURE — 80048 BASIC METABOLIC PNL TOTAL CA: CPT | Performed by: HOSPITALIST

## 2021-05-08 PROCEDURE — 36415 COLL VENOUS BLD VENIPUNCTURE: CPT | Performed by: INTERNAL MEDICINE

## 2021-05-08 RX ORDER — POTASSIUM CHLORIDE 1.5 G/1.58G
20 POWDER, FOR SOLUTION ORAL ONCE
Status: COMPLETED | OUTPATIENT
Start: 2021-05-08 | End: 2021-05-08

## 2021-05-08 RX ORDER — DEXTROSE MONOHYDRATE, SODIUM CHLORIDE, AND POTASSIUM CHLORIDE 50; 1.49; 4.5 G/1000ML; G/1000ML; G/1000ML
INJECTION, SOLUTION INTRAVENOUS CONTINUOUS
Status: DISCONTINUED | OUTPATIENT
Start: 2021-05-08 | End: 2021-05-09 | Stop reason: HOSPADM

## 2021-05-08 RX ORDER — MAGNESIUM OXIDE 400 MG/1
400 TABLET ORAL 2 TIMES DAILY
Status: DISCONTINUED | OUTPATIENT
Start: 2021-05-08 | End: 2021-05-09

## 2021-05-08 RX ADMIN — POTASSIUM & SODIUM PHOSPHATES POWDER PACK 280-160-250 MG 2 PACKET: 280-160-250 PACK at 09:50

## 2021-05-08 RX ADMIN — MAGNESIUM OXIDE TAB 400 MG (241.3 MG ELEMENTAL MG) 400 MG: 400 (241.3 MG) TAB at 09:50

## 2021-05-08 RX ADMIN — POTASSIUM CHLORIDE 20 MEQ: 1.5 POWDER, FOR SOLUTION ORAL at 22:38

## 2021-05-08 RX ADMIN — ATENOLOL 50 MG: 50 TABLET ORAL at 08:15

## 2021-05-08 RX ADMIN — HEPARIN SODIUM 5000 UNITS: 5000 INJECTION INTRAVENOUS; SUBCUTANEOUS at 08:15

## 2021-05-08 RX ADMIN — POTASSIUM CHLORIDE, DEXTROSE MONOHYDRATE AND SODIUM CHLORIDE 1000 ML: 150; 5; 450 INJECTION, SOLUTION INTRAVENOUS at 19:18

## 2021-05-08 RX ADMIN — THIAMINE HCL TAB 100 MG 200 MG: 100 TAB at 08:15

## 2021-05-08 RX ADMIN — FOLIC ACID 1 MG: 1 TABLET ORAL at 08:15

## 2021-05-08 RX ADMIN — Medication 5 MG: at 21:23

## 2021-05-08 RX ADMIN — SODIUM CHLORIDE 1000 ML: 9 INJECTION, SOLUTION INTRAVENOUS at 08:30

## 2021-05-08 RX ADMIN — DEXTROSE AND SODIUM CHLORIDE: 5; 900 INJECTION, SOLUTION INTRAVENOUS at 07:41

## 2021-05-08 RX ADMIN — MAGNESIUM OXIDE TAB 400 MG (241.3 MG ELEMENTAL MG) 400 MG: 400 (241.3 MG) TAB at 20:35

## 2021-05-08 RX ADMIN — POTASSIUM CHLORIDE 20 MEQ: 1.5 POWDER, FOR SOLUTION ORAL at 16:30

## 2021-05-08 RX ADMIN — THIAMINE HCL TAB 100 MG 200 MG: 100 TAB at 21:23

## 2021-05-08 RX ADMIN — MULTIPLE VITAMINS W/ MINERALS TAB 1 TABLET: TAB at 08:15

## 2021-05-08 RX ADMIN — THIAMINE HCL TAB 100 MG 200 MG: 100 TAB at 16:30

## 2021-05-08 RX ADMIN — HEPARIN SODIUM 5000 UNITS: 5000 INJECTION INTRAVENOUS; SUBCUTANEOUS at 20:36

## 2021-05-08 RX ADMIN — LISINOPRIL 5 MG: 5 TABLET ORAL at 08:15

## 2021-05-08 RX ADMIN — SODIUM CHLORIDE: 9 INJECTION, SOLUTION INTRAVENOUS at 04:14

## 2021-05-08 RX ADMIN — POTASSIUM & SODIUM PHOSPHATES POWDER PACK 280-160-250 MG 2 PACKET: 280-160-250 PACK at 21:23

## 2021-05-08 RX ADMIN — POTASSIUM & SODIUM PHOSPHATES POWDER PACK 280-160-250 MG 2 PACKET: 280-160-250 PACK at 16:30

## 2021-05-08 ASSESSMENT — MIFFLIN-ST. JEOR: SCORE: 1182.23

## 2021-05-08 ASSESSMENT — ACTIVITIES OF DAILY LIVING (ADL)
ADLS_ACUITY_SCORE: 12
ADLS_ACUITY_SCORE: 13
ADLS_ACUITY_SCORE: 13
ADLS_ACUITY_SCORE: 12

## 2021-05-08 NOTE — PLAN OF CARE
"A&Ox4, SBA, RA, Tele ST, CIWA 1&1, PIV (L) arm infusing NS 150mL. Clear Liq Diet. Daily Wt. Denies Pain. VS: BP (!) 152/94 (BP Location: Right arm)   Pulse 134   Temp 97.8  F (36.6  C) (Oral)   Resp 20   Ht 1.702 m (5' 7\")   Wt 62.5 kg (137 lb 11.2 oz)   SpO2 100%   BMI 21.57 kg/m     "

## 2021-05-08 NOTE — PLAN OF CARE
A/O x 4. VSS on RA except elevated BP improved w/ scheduled HTN meds. C/O abd pain associated w/ coughing, pt aware of PRN analgesic and verbalized understanding that available if needed. CIWA 1, 2, 2. Tele ST/SR ('s-120's) w/ occ PVCs, denied CP. LS clear, denied SOB. PIV to left intact, infusing w/ D5 NS @ 150 ml/hr. AM Mg and Phosph replaced per protocol. Up freq SBA, informed refusal for GB/W, good UOP and one loose BM. Potential discharge 2-3 days. Spouse at bedside. Will continue POC.

## 2021-05-08 NOTE — PROVIDER NOTIFICATION
MD Paged: FYI, Pt had critical ketone lab value of 5.8 - trended down from prev. value of 6.3    Thank you

## 2021-05-08 NOTE — CONSULTS
Care Management Initial Consult    General Information  Assessment completed with: Patient         Primary Care Provider verified and updated as needed:     Readmission within the last 30 days:        Reason for Consult: discharge planning, substance use concerns  Advance Care Planning:            Communication Assessment  Patient's communication style: spoken language (English or Bilingual)    Hearing Difficulty or Deaf: no   Wear Glasses or Blind: yes    Cognitive  Cognitive/Neuro/Behavioral: WDL                      Living Environment:   People in home: spouse     Current living Arrangements: house      Able to return to prior arrangements: yes       Family/Social Support:  Care provided by: self, spouse/significant other  Provides care for: no one  Marital Status:     Blas        Description of Support System: Supportive, Involved    Support Assessment: Adequate family and caregiver support, Adequate social supports    Current Resources:   Patient receiving home care services:       Community Resources:    Equipment currently used at home: none  Supplies currently used at home:      Chemical Dependency Status:    Offered and discussed CD resources but patient declined at this time.           Values/Beliefs:  Spiritual, Cultural Beliefs, Yazidism Practices, Values that affect care:                 Additional Information:  Patient reported he resides with her spouse. She noted she is independent in ADL and IADLS. Spouse is able to assist as needed. Spouse able to transport upon discharge. Discussed substance use. Patient reported she has reduced her drinking significantly over the past few months. She noted she is not interested in treatment at this time. Offered [facility] unit but pt declined and noted she able to manage independently. Offered other community resources but pt declined at this time. No SW needs identified. Re-consult if  is needed.     Geronimo Carrasco,  [facility]  Care Management   [phone]

## 2021-05-08 NOTE — CONSULTS
"CLINICAL NUTRITION SERVICES  -  ASSESSMENT NOTE  Recommendations Ordered by Registered Dietitian (RD):     Phos add on   Future/Additional Recommendations:    Oral nutrition supplements   Malnutrition:   % Weight Loss: unable to determine   % Intake:</= 50% for >/= 5 days (severe malnutrition)  Subcutaneous Fat Loss: moderate to severe, as outlined below   Muscle Loss: moderate to severe, as outlined below   Fluid Retention:None noted    Malnutrition Diagnosis: Severe malnutrition  In Context of:  Acute illness or injury with underlying Environmental or social circumstances     REASON FOR ASSESSMENT  Rizwana Aguilar is a 70 year old female seen by Registered Dietitian for Provider Order - Alcohol Withdrawal + positive malnutrition risk screen    History of alcohol abuse with alcohol dependence, alcoholic pancreatitis, possible alcoholic liver cirrhosis, hypertension who presented on 5/7/2021 with chief complaint of abdominal pain, nausea, and vomiting.      NUTRITION HISTORY    Information obtained from patient and spouse    Food allergies/intolerances: NKFA    Patient is on a regular diet at home.    Typical food/fluid intake PTA: 2 days to 1 week - conflicting info from patient vs spouse    Breakfast and Lunch: \"a little bit\" - hard to get specifics    Dinner: skipping in the last week    Fluids: water, 2% milk, 2 martinis    Supplements at home: 1 boost per day previously, not in the last week    Living situation: with spouse    Food access concerns: did not address    Factors affecting nutrition intake include: nausea, vomiting, diarrhea, decreased appetite    CURRENT NUTRITION ORDERS    Diet: Clear Liquid     Supplement: none   Current Intake/Tolerance:    Per flow sheet review, no intake for meals documented.     Factors affecting nutrition intake include: altered GI function    NUTRITION FOCUSED PHYSICAL ASSESSMENT FOR DIAGNOSING MALNUTRITION + PHYSICAL FINDINGS  Completed and Observed:  Yes   Muscle loss: " "moderate or greater in upper body (scapular, clavicle, acromion) regions; moderate to severe depletion in temporal, calf, patellar and upper thigh regions  Fat loss: moderate to severe depletion in upper and lower arms, orbital regions; moderate depletion in thoracic region  Obtained from Chart/Interdisciplinary Team    Olivier nutrition score: 2; total score: 18    Last BM:     Generalized weakness     Edema: none    CIWA trending    Temp (24hrs), Av.5  F (36.4  C), Min:96.8  F (36  C), Max:98.3  F (36.8  C)     I/O last 3 completed shifts:    In: 1000 [IV Piggyback:1000]    Out: -     ANTHROPOMETRICS  Height: 5' 7\"  Weight: 62 kg   Body mass index is 21.57 kg/m .  Weight Status:  Normal BMI  Weight History: patient endorses weight loss in last week  Wt Readings from Last 10 Encounters:   21 62.5 kg (137 lb 11.2 oz)   20 56 kg (123 lb 7.3 oz)       ASSESSED NUTRITION NEEDS (PER APPROVED PRACTICE GUIDELINES, Dosing weight: 62 kg):  Estimated Energy Needs: 1117-9234 kcals (25-30 kcal/kg)  Justification: minimum maintenance  Estimated Protein Needs: 62-74+ grams protein (1-1.2 g per kg)   Justification: preservation of lean body mass  Estimated Fluid Needs: per provider    LABS  Labs reviewed; lipase 2411 (H)  Electrolytes  Potassium (mmol/L)   Date Value   2021 3.9   2021 4.8   2020 3.2 (L)     Phosphorus (mg/dL)   Date Value   2021 1.2 (L)   2021 2.6   2020 3.1    Blood Glucose  Glucose (mg/dL)   Date Value   2021 85   2021 95   2020 103 (H)   01/10/2020 92   2020 81    Inflammatory Markers  WBC (10e9/L)   Date Value   2021 7.5   2021 6.8     Albumin (g/dL)   Date Value   2021 3.4   2021 4.3      Magnesium (mg/dL)   Date Value   2021 1.4 (L)   2021 1.4 (L)   01/10/2020 2.0     Sodium (mmol/L)   Date Value   2021 139   2021 136   2020 140    Renal  Urea Nitrogen (mg/dL)   Date Value "   05/08/2021 8   05/07/2021 12   01/11/2020 3 (L)     Creatinine (mg/dL)   Date Value   05/08/2021 0.71   05/07/2021 0.92   01/11/2020 0.49 (L)     Additional  Ketones Urine (mg/dL)   Date Value   01/08/2020 >150 (A)        MEDICATIONS    Medications reviewed    atenolol  50 mg Oral Daily     folic acid  1 mg Oral Daily     heparin ANTICOAGULANT  5,000 Units Subcutaneous Q12H     lisinopril  5 mg Oral Daily     magnesium oxide  400 mg Oral BID     multivitamin w/minerals  1 tablet Oral Daily     potassium & sodium phosphates  2 packet Oral or Feeding Tube TID     sodium chloride (PF)  3 mL Intracatheter Q8H     thiamine  200 mg Oral TID    Followed by     [START ON 5/9/2021] thiamine  100 mg Oral TID    Followed by     [START ON 5/15/2021] thiamine  100 mg Oral Daily          dextrose 5% and 0.9% NaCl 150 mL/hr at 05/08/21 0741          NUTRITION DIAGNOSIS:  Malnutrition related to poor oral intake and ongoing ETOH abuse as evidenced by fat and muscle wasting    Inadequate oral intake related to altered GI function (pancreatitis) as evidenced by clear liquid diet restriction    INTERVENTIONS  Recommendations / Nutrition Prescription  Diet advancement per MD discretion + oral nutrition supplements to increase calorie and protein intake when appropriate  Continue micronutrients as ordered. Phos add on with D5 infusion, risk for refeeding syndrome    Implementation  Nutrition education: reviewed oral nutrition supplements options with diet advancement; can provide additional education for discharge when more appropriate   Biochemical data: phos add on   Medical food supplement: Ensure between meals when diet advanced    Goals  Diet >/= full liquids within 48 hours    MONITORING AND EVALUATION:  Progress towards goals will be monitored and evaluated per protocol and Practice Guidelines      Preeti Wright, MS, RDN-AP, LD, CNSC  Pager - 3rd floor/ICU: [phone]  Pager - All other floors: [phone]  Pager -  Weekend/holiday: [phone]  Office: [phone]

## 2021-05-08 NOTE — PROGRESS NOTES
[facility]    Hospitalist Progress Note  Name: Rizwana Aguilar    MRN: [id number]  Provider:  Pablo Garcia DO, MPH  Date of Service: 05/08/2021    Summary of Stay: Rizwana Aguilar is a 70 year old female with a history of longstanding alcohol abuse/dependence, alcoholic pancreatitis, suspected liver cirrhosis, and hypertension admitted on 5/7/2021 with abdominal pain for 3 days duration.  Was found to have lipase of 7000 with bicarb of 13 and anion gap of 24.  Serum ketones were 6.3 with lactic acid 2.4.  Transaminases slightly elevated at 130 apiece.  Creatinine slightly elevated at 0.9.  CT showed edematous pancreatitis consistent with acute pancreatitis.    Problem List:   1. Acute alcoholic pancreatitis: CT of the abdomen shows an edematous pancreatitis but no complications such as necrosis, cyst, or abscess.  We will continue D5 normal saline at 150 cc/h.  Continue clear liquid diet but will not advance further today.  Continue to use analgesics and antiemetics as needed.  2. Anion gap metabolic acidosis: Ketones and lactic acid were elevated which I suspect is the etiology.  Quite acidotic on admission and bicarb is still only 13.  Will give 1 L of normal saline bolus now over 2 hours and continue aggressive fluid resuscitation.  Plan to recheck BMP and VBG in 3 hours and if no improvement in bicarb we will add bicarb to her fluids.  3. Alcohol use disorder: Continues to drink regularly high volumes of alcohol.  Per her  she minimizes her usage.  Currently no evidence of withdrawal but certainly high risk for withdrawal to develop over the coming days.  Will not start gabapentin protocol we will continue the CIWA protocol with trigger dosing lorazepam.  4. Acute kidney injury: Creatinine elevated at 2.9 with baseline 0.5.  Already trending down with IV fluids.  5. Electrolyte derangement: Potassium normal but magnesium and phosphorus low.  Replacement protocols available.  6. Polycythemia:  Likely due to volume depletion.  Continue aggressive IV fluids.  Hematocrit trending down.  7. Transaminitis: Likely due to alcoholic hepatitis with possible cirrhosis.  CT shows steatosis.  Continue to trend liver function test.  Complete alcohol cessation necessary.  8. Hypertension: Blood pressure remains elevated.  Likely some degree of withdrawal contributing.  Continue prior to admission atenolol and lisinopril.  IV hydralazine available as needed.    DVT Prophylaxis: Heparin subcutaneous.  Code Status: Full Code  Diet: Clear Liquid Diet    Billings Catheter: not present  Disposition: Expected discharge in 2-3 days to home. Goals prior to discharge include manage pancreatitis.   Incidental Findings: None.  Family updated today: Yes      Interval History   Assumed care from previous hospitalist. The history was fully reviewed.  The patient reports doing well. No chest pain or shortness of breath. No nausea, vomiting, diarrhea, constipation. No fevers. No other specific complaints identified.     -Data reviewed today: I personally reviewed all new labs and imaging results over the last 24 hours.     Physical Exam   Temp: 98.3  F (36.8  C) Temp src: Oral BP: (!) 145/77 Pulse: 84   Resp: 20 SpO2: 99 % O2 Device: None (Room air)    Vitals:    05/07/21 1442 05/07/21 1943 05/08/21 0612   Weight: 65.8 kg (145 lb) 62.1 kg (137 lb) 62.5 kg (137 lb 11.2 oz)     Vital Signs with Ranges  Temp:  [96.8  F (36  C)-98.3  F (36.8  C)] 98.3  F (36.8  C)  Pulse:  [] 84  Resp:  [15-24] 20  BP: (137-192)/() 145/77  SpO2:  [99 %-100 %] 99 %  I/O last 3 completed shifts:  In: 1000 [IV Piggyback:1000]  Out: -     GENERAL: No apparent distress. Awake, alert, and fully oriented. Chronically ill, malnourished.  HEENT: Normocephalic, atraumatic. Extraocular movements intact.  CARDIOVASCULAR: Regular rate and rhythm without murmurs or rubs. No S3.  PULMONARY: Clear bilaterally.  GASTROINTESTINAL: Soft, non-tender, non-distended.  Bowel sounds normoactive.   EXTREMITIES: No cyanosis or clubbing. No edema.  NEUROLOGICAL: CN 2-12 grossly intact, no focal neurological deficits.  DERMATOLOGICAL: No rash, ulcer, bruising, nor jaundice.     Medications     dextrose 5% and 0.9% NaCl 150 mL/hr at 05/08/21 0741       atenolol  50 mg Oral Daily     folic acid  1 mg Oral Daily     heparin ANTICOAGULANT  5,000 Units Subcutaneous Q12H     lisinopril  5 mg Oral Daily     magnesium oxide  400 mg Oral BID     multivitamin w/minerals  1 tablet Oral Daily     potassium & sodium phosphates  2 packet Oral or Feeding Tube TID     sodium chloride (PF)  3 mL Intracatheter Q8H     thiamine  200 mg Oral TID    Followed by     [START ON 5/9/2021] thiamine  100 mg Oral TID    Followed by     [START ON 5/15/2021] thiamine  100 mg Oral Daily     Data     Laboratory:  Recent Labs   Lab 05/08/21  0617 05/07/21  1621   WBC 7.5 6.8   HGB 14.3 16.8*   HCT 42.3 50.5*   * 106*   * 215     Recent Labs   Lab 05/08/21  0617 05/07/21  1621    136   POTASSIUM 3.9 4.8   CHLORIDE 110* 99   CO2 13* 13*   ANIONGAP 16* 24*   GLC 85 95   BUN 8 12   CR 0.71 0.92   GFRESTIMATED 87 64   GFRESTBLACK >90 74   KHUSHI 8.4* 10.0     Recent Labs   Lab 05/08/21  0617 05/07/21  1621   AST 95* 130*   ALT 97* 130*   ALKPHOS 79 95   BILITOTAL 0.8 0.9     Recent Labs   Lab 05/07/21  1621   INR 0.86     Recent Labs   Lab 05/08/21  0617 05/07/21  1621   LACT 1.2 2.4*     Recent Labs   Lab 05/08/21  0617 05/07/21  1621   LIPASE 2,411* 7,093*     No results for input(s): CULT in the last 168 hours.    Imaging:  Recent Results (from the past 24 hour(s))   CT Abdomen Pelvis w Contrast    Narrative    EXAM: CT ABDOMEN PELVIS W CONTRAST  LOCATION: [facility]  DATE/TIME: 5/7/2021 6:44 PM    INDICATION: Abdominal pain, acute, nonlocalized  COMPARISON: Ultrasound 01/08/2020  TECHNIQUE: CT scan of the abdomen and pelvis was performed following injection of IV contrast. Multiplanar  reformats were obtained. Dose reduction techniques were used.  CONTRAST: 73 mL Isovue-[id number]    FINDINGS:   LOWER CHEST: Mitral annulus calcification. Moderate hiatal hernia.    HEPATOBILIARY: Hepatic steatosis. No radiopaque gallstone.    PANCREAS: Moderate inflammatory changes around the pancreas consistent with acute edematous interstitial pancreatitis. Homogeneous pancreatic enhancement. No loculated fluid collection.    SPLEEN: Normal.    ADRENAL GLANDS: Normal.    KIDNEYS/BLADDER: Normal.    BOWEL: Diverticulosis of the colon. No acute inflammatory change. No obstruction.     LYMPH NODES: Normal.    VASCULATURE: Moderate atherosclerosis. The splenic vein is patent.    PELVIC ORGANS: No mass. Bilateral fallopian tube occlusion devices. No free fluid in the pelvis.    MUSCULOSKELETAL: Mild degenerative changes of the spine and hips. A remote L1 compression fracture with approximately 50% loss of height anteriorly. Osseous demineralization.      Impression    IMPRESSION:   1.  Acute edematous interstitial pancreatitis.  2.  Hepatic steatosis.  3.  Osseous demineralization with a remote L1 compression fracture. Recommend correlation with DEXA scan, if not previously performed.         Pablo Garcia DO MPH  [facility] Hospitalist  [address] [place] [address]  Pager: [phone]  05/08/2021

## 2021-05-08 NOTE — PROGRESS NOTES
Cross cover notified of patient with ketone level of 5.8 down from 6.3 on admission.  Reviewed chart.  She is here with alcohol pancreatitis and starvation ketoacidosis.  -Change NS to D5 NS at 150 ml/hr

## 2021-05-09 VITALS
SYSTOLIC BLOOD PRESSURE: 151 MMHG | WEIGHT: 139.2 LBS | TEMPERATURE: 98.2 F | OXYGEN SATURATION: 98 % | HEART RATE: 87 BPM | HEIGHT: 67 IN | DIASTOLIC BLOOD PRESSURE: 84 MMHG | BODY MASS INDEX: 21.85 KG/M2 | RESPIRATION RATE: 20 BRPM

## 2021-05-09 LAB
ALBUMIN SERPL-MCNC: 2.9 G/DL (ref 3.4–5)
ALP SERPL-CCNC: 71 U/L (ref 40–150)
ALT SERPL W P-5'-P-CCNC: 75 U/L (ref 0–50)
ANION GAP SERPL CALCULATED.3IONS-SCNC: 9 MMOL/L (ref 3–14)
AST SERPL W P-5'-P-CCNC: 73 U/L (ref 0–45)
BILIRUB DIRECT SERPL-MCNC: 0.3 MG/DL (ref 0–0.2)
BILIRUB SERPL-MCNC: 0.9 MG/DL (ref 0.2–1.3)
BUN SERPL-MCNC: 2 MG/DL (ref 7–30)
CALCIUM SERPL-MCNC: 8.4 MG/DL (ref 8.5–10.1)
CHLORIDE SERPL-SCNC: 107 MMOL/L (ref 94–109)
CO2 SERPL-SCNC: 21 MMOL/L (ref 20–32)
CREAT SERPL-MCNC: 0.45 MG/DL (ref 0.52–1.04)
ERYTHROCYTE [DISTWIDTH] IN BLOOD BY AUTOMATED COUNT: 12.4 % (ref 10–15)
GFR SERPL CREATININE-BSD FRML MDRD: >90 ML/MIN/{1.73_M2}
GLUCOSE SERPL-MCNC: 126 MG/DL (ref 70–99)
HCT VFR BLD AUTO: 36.4 % (ref 35–47)
HGB BLD-MCNC: 12.5 G/DL (ref 11.7–15.7)
LACTATE BLD-SCNC: 0.9 MMOL/L (ref 0.7–2)
LIPASE SERPL-CCNC: 641 U/L (ref 73–393)
MAGNESIUM SERPL-MCNC: 1 MG/DL (ref 1.6–2.3)
MCH RBC QN AUTO: 33.8 PG (ref 26.5–33)
MCHC RBC AUTO-ENTMCNC: 34.3 G/DL (ref 31.5–36.5)
MCV RBC AUTO: 98 FL (ref 78–100)
PHOSPHATE SERPL-MCNC: 0.9 MG/DL (ref 2.5–4.5)
PLATELET # BLD AUTO: 99 10E9/L (ref 150–450)
POTASSIUM SERPL-SCNC: 3.1 MMOL/L (ref 3.4–5.3)
PROT SERPL-MCNC: 6.1 G/DL (ref 6.8–8.8)
RBC # BLD AUTO: 3.7 10E12/L (ref 3.8–5.2)
SODIUM SERPL-SCNC: 137 MMOL/L (ref 133–144)
WBC # BLD AUTO: 4.2 10E9/L (ref 4–11)

## 2021-05-09 PROCEDURE — 85027 COMPLETE CBC AUTOMATED: CPT | Performed by: HOSPITALIST

## 2021-05-09 PROCEDURE — 83605 ASSAY OF LACTIC ACID: CPT | Performed by: HOSPITALIST

## 2021-05-09 PROCEDURE — 80048 BASIC METABOLIC PNL TOTAL CA: CPT | Performed by: HOSPITALIST

## 2021-05-09 PROCEDURE — 80076 HEPATIC FUNCTION PANEL: CPT | Performed by: HOSPITALIST

## 2021-05-09 PROCEDURE — 36415 COLL VENOUS BLD VENIPUNCTURE: CPT | Performed by: HOSPITALIST

## 2021-05-09 PROCEDURE — 83690 ASSAY OF LIPASE: CPT | Performed by: HOSPITALIST

## 2021-05-09 PROCEDURE — 83735 ASSAY OF MAGNESIUM: CPT | Performed by: HOSPITALIST

## 2021-05-09 PROCEDURE — 258N000003 HC RX IP 258 OP 636: Performed by: HOSPITALIST

## 2021-05-09 PROCEDURE — 84100 ASSAY OF PHOSPHORUS: CPT | Performed by: HOSPITALIST

## 2021-05-09 PROCEDURE — 250N000013 HC RX MED GY IP 250 OP 250 PS 637: Performed by: HOSPITALIST

## 2021-05-09 PROCEDURE — 99238 HOSP IP/OBS DSCHRG MGMT 30/<: CPT | Performed by: HOSPITALIST

## 2021-05-09 PROCEDURE — 250N000011 HC RX IP 250 OP 636: Performed by: INTERNAL MEDICINE

## 2021-05-09 PROCEDURE — 250N000013 HC RX MED GY IP 250 OP 250 PS 637: Performed by: INTERNAL MEDICINE

## 2021-05-09 RX ORDER — POTASSIUM CHLORIDE 1.5 G/1.58G
20 POWDER, FOR SOLUTION ORAL ONCE
Status: COMPLETED | OUTPATIENT
Start: 2021-05-09 | End: 2021-05-09

## 2021-05-09 RX ORDER — MAGNESIUM SULFATE HEPTAHYDRATE 40 MG/ML
4 INJECTION, SOLUTION INTRAVENOUS ONCE
Status: DISCONTINUED | OUTPATIENT
Start: 2021-05-09 | End: 2021-05-09 | Stop reason: HOSPADM

## 2021-05-09 RX ADMIN — MULTIPLE VITAMINS W/ MINERALS TAB 1 TABLET: TAB at 08:00

## 2021-05-09 RX ADMIN — MAGNESIUM OXIDE TAB 400 MG (241.3 MG ELEMENTAL MG) 400 MG: 400 (241.3 MG) TAB at 08:00

## 2021-05-09 RX ADMIN — THIAMINE HCL TAB 100 MG 200 MG: 100 TAB at 08:01

## 2021-05-09 RX ADMIN — FOLIC ACID 1 MG: 1 TABLET ORAL at 08:00

## 2021-05-09 RX ADMIN — HEPARIN SODIUM 5000 UNITS: 5000 INJECTION INTRAVENOUS; SUBCUTANEOUS at 08:01

## 2021-05-09 RX ADMIN — LISINOPRIL 5 MG: 5 TABLET ORAL at 08:01

## 2021-05-09 RX ADMIN — ATENOLOL 50 MG: 50 TABLET ORAL at 08:00

## 2021-05-09 RX ADMIN — POTASSIUM CHLORIDE, DEXTROSE MONOHYDRATE AND SODIUM CHLORIDE: 150; 5; 450 INJECTION, SOLUTION INTRAVENOUS at 02:30

## 2021-05-09 RX ADMIN — POTASSIUM CHLORIDE 20 MEQ: 1.5 POWDER, FOR SOLUTION ORAL at 08:00

## 2021-05-09 ASSESSMENT — ACTIVITIES OF DAILY LIVING (ADL)
ADLS_ACUITY_SCORE: 16
ADLS_ACUITY_SCORE: 12
ADLS_ACUITY_SCORE: 12

## 2021-05-09 ASSESSMENT — MIFFLIN-ST. JEOR: SCORE: 1189.04

## 2021-05-09 NOTE — PROVIDER NOTIFICATION
"MD paged: \"Pt is adamant to leave AMA. No specific reason other than \"I can't be here anymore.\" Says  on way here. Do you want to see her?\"  "

## 2021-05-09 NOTE — PROVIDER NOTIFICATION
"DATE:  5/9/2021   TIME OF RECEIPT FROM LAB:  0806  LAB TEST:  Phosphorous   LAB VALUE:  0.9  TIME LAB VALUE REPORTED TO PROVIDER:   08Mona MURRAY paged: \"Lab called w/ critical, phosphorous is 0.9. Please advise as needed, thanks\"    Addendum: MD called this writer w/ recommendation to document pt informed refusal for electrolyte replacement protocols. MD aware that pt has signed AMA form.    "

## 2021-05-09 NOTE — DISCHARGE SUMMARY
"AVS printed/given to pt. Education provided about risks of leaving AMA, pt verbalized understanding and stated, \"I am leaving today, thank you.\"  Pt and  deny further questions or concerns. Telemetry monitor removed. All belongings returned. Pt left unit having signed AMA form, transport home via .  "

## 2021-05-09 NOTE — PLAN OF CARE
"BP (!) 143/71 (BP Location: Right arm)   Pulse 95   Temp 98.1  F (36.7  C) (Oral)   Resp 20   Ht 1.702 m (5' 7\")   Wt 62.5 kg (137 lb 11.2 oz)   SpO2 99%   BMI 21.57 kg/m    VS WNL, denies SoB or chest pain, SR on tele. Endorses abdominal pain 3/10, tolerating PO medications well. Switched to D5 0.45 NS with 20mEq potassium at 150 mL/hr. Potassium and phosphorus replaced. Incontinent of bowel x1. Anticipated discharge 2-3 days.    "

## 2021-05-09 NOTE — DISCHARGE SUMMARY
"[facility]    Discharge Summary  Hospitalist    Date of Admission:  5/7/2021  Date of Discharge:  5/9/2021  Discharging Provider: Jean-Pierre Fernandez MD  Date of Service (when I saw the patient): 05/09/21    Discharge Diagnoses   #Acute alcoholic pancreatitis  #Anion gap metabolic acidosis with lactic acidosis   #Alcohol use disorder  #Acute kidney injury  #Severe malnutrition in context of acute and chronic illness  #Hypomagnesemia and hypophosphatemia  #Transaminitis secondary to alcoholic hepatitis  #History of hypertension    History of Present Illness   \"Rizwana Aguilar is a 70 year old female with past medical history of alcohol abuse with alcohol dependence, alcoholic pancreatitis, possible alcoholic liver cirrhosis, hypertension who presented on 5/7/2021 with chief complaint of abdominal pain, nausea, and vomiting.  The patient states approximately 3 days ago she developed diffuse abdominal pain.  She states it is constant in nature.  She reports that she developed nausea and vomiting approximately 2 days ago and has been unable to keep down food for the last 2 days.  She also reports she has been unable to take her daily medications because of this.  She states she was drinking 2 glasses of vodka per day with her last drink on 5/6/2021.  She denies any history of alcohol withdrawal or alcohol withdrawal seizures.  She states she is recently been trying to cut down on her alcohol.  She currently lives with her  at home.  The patient was admitted in 1/2021 for similar issue and found to have alcoholic pancreatitis at that time.     In the emergency department the patient was found to have a lipase of 7093, bicarb 13, anion gap 24, ketone quantitative 6.3, lactic acid 2.4, AST//130, creatinine 0.92.  Secondary to the patient's abdominal pain and elevated lipase a CT abdomen and pelvis was obtained.\"    Hospital Course   Rizwana Aguilar is a 70 year old female with a history of longstanding " alcohol abuse/dependence, alcoholic pancreatitis, suspected liver cirrhosis, and hypertension admitted on 5/7/2021 with abdominal pain for 3 days duration.  Was found to have lipase of 7000 with bicarb of 13 and anion gap of 24.  Serum ketones were 6.3 with lactic acid 2.4.  Transaminases slightly elevated at 130 apiece.  Creatinine slightly elevated at 0.9.  CT showed edematous pancreatitis consistent with acute pancreatitis.    #Acute alcoholic pancreatitis: CT of the abdomen shows an edematous pancreatitis but no complications such as necrosis, cyst, or abscess.    She was placed on IV fluids.  She was maintained on a clear liquid diet.  She was going to have slow advancement of her diet with pain control.  On 5/9, patient was adamant that she was going to go home no matter what.  I did voiced to her the risks of going home including worsening abdominal pain, nausea vomiting and even death with significant decompensation given significant acidosis on presentation.  She did voice understanding of this but was adamant that she was going home.  -I did recommend to her slow advancement of her diet and continuation of a low-fat diet and complete alcohol cessation moving forward.  She did voice understanding of this.  I did also recommend that she call her PCP to follow-up with him or her in the next week.  I also did tell her that if her symptoms worsen that she should come back to the hospital.  She was discharged AGAINST MEDICAL ADVICE    #Anion gap metabolic acidosis: Ketones and lactic acid were elevated which I suspect is the etiology.    She was quite acidotic on admission with low bicarb.  She was given IV fluids and treated for acute pancreatitis as above.  She left AMA as above.    #Alcohol use disorder: Continues to drink regularly high volumes of alcohol.  Per her  she minimizes her usage.    She was placed on CIWA protocol.  Advised cessation moving forward and she did voice understanding of  this.    #Acute kidney injury: Creatinine elevated at 2.9 with baseline 0.5.    Treated with IV fluids.    #Electrolyte derangement: Potassium normal but magnesium and phosphorus low.  Replacement protocols available.    #Transaminitis: Likely due to alcoholic hepatitis with possible cirrhosis.  CT shows steatosis.  Continue to trend liver function test.  Complete alcohol cessation necessary.    #Hypertension: Blood pressure remains elevated.  Likely some degree of withdrawal contributing.  Continue prior to admission atenolol and lisinopril.  IV hydralazine available as needed.    Jean-Pierre Fernandez MD    Pending Results   These results will be followed up by PCP  Unresulted Labs Ordered in the Past 30 Days of this Admission     Date and Time Order Name Status Description    5/9/2021 0526 Phosphorus In process     5/9/2021 0003 Magnesium In process     5/9/2021 0003 Lipase In process     5/9/2021 0003 Hepatic panel In process     5/9/2021 0003 CBC with platelets In process     5/9/2021 0003 Basic metabolic panel In process           Code Status   Full Code       Primary Care Physician   FELIPE GODOY    Physical Exam   Temp: 98.2  F (36.8  C) Temp src: Oral BP: (!) 177/109 Pulse: 133   Resp: 20 SpO2: 98 % O2 Device: None (Room air)    Vitals:    05/07/21 1943 05/08/21 0612 05/09/21 0642   Weight: 62.1 kg (137 lb) 62.5 kg (137 lb 11.2 oz) 63.1 kg (139 lb 3.2 oz)     Vital Signs with Ranges  Temp:  [98.1  F (36.7  C)-98.4  F (36.9  C)] 98.2  F (36.8  C)  Pulse:  [] 133  Resp:  [20] 20  BP: (143-180)/() 177/109  SpO2:  [98 %-99 %] 98 %  I/O last 3 completed shifts:  In: 3093 [P.O.:200; I.V.:2893]  Out: -     GENERAL: No apparent distress. Awake, alert, and fully oriented. Chronically ill, malnourished.  HEENT: Normocephalic, atraumatic. Extraocular movements intact.  CARDIOVASCULAR: Regular rate and rhythm without murmurs or rubs. No S3.  PULMONARY: Clear bilaterally.  GASTROINTESTINAL: Soft, mildly tender in the  epigastric region.  Nondistended.  No rebound or guarding.  Bowel sounds present.  EXTREMITIES: No cyanosis or clubbing. No edema.  NEUROLOGICAL: CN 2-12 grossly intact, no focal neurological deficits.  DERMATOLOGICAL: No rash, ulcer, bruising, nor jaundice.    Discharge Disposition   Discharged to home  Condition at discharge: Stable    Consultations This Hospital Stay   CARE MANAGEMENT / SOCIAL WORK IP CONSULT  NUTRITION SERVICES ADULT IP CONSULT    Time Spent on this Encounter   I, Jean-Pierre Fernadnez MD, personally saw the patient today and spent greater than 30 minutes discharging this patient.    Discharge Orders   No discharge procedures on file.  Discharge Medications   Current Discharge Medication List      CONTINUE these medications which have NOT CHANGED    Details   acetaminophen (TYLENOL) 325 MG tablet Take 325-650 mg by mouth every 8 hours as needed for mild pain      atenolol (TENORMIN) 50 MG tablet Take 50 mg by mouth daily       calcium carbonate (OS-KHUSHI) 500 MG tablet Take 1 tablet by mouth daily      folic acid (FOLVITE) 1 MG tablet Take 1 tablet (1 mg) by mouth daily  Qty: 30 tablet, Refills: 0    Associated Diagnoses: Alcohol withdrawal syndrome, with delirium (H)      lisinopril (ZESTRIL) 5 MG tablet Take 5 mg by mouth daily      Magnesium Oxide 250 MG TABS Take 250 mg by mouth daily      vitamin B1 (THIAMINE) 100 MG tablet Take 1 tablet (100 mg) by mouth daily  Qty: 30 tablet, Refills: 0    Associated Diagnoses: Alcohol withdrawal syndrome, with delirium (H)           Allergies   Allergies   Allergen Reactions     Penicillins Unknown     Data   Most Recent 3 CBC's:  Recent Labs   Lab Test 05/08/21  0617 05/07/21  1621 01/11/20  0730   WBC 7.5 6.8 4.5   HGB 14.3 16.8* 11.0*   * 106* 99   * 215 141*      Most Recent 3 BMP's:  Recent Labs   Lab Test 05/09/21  0658 05/08/21  2103 05/08/21  1455 05/08/21  0617     --  138 139   POTASSIUM 3.1* 3.2* 3.1* 3.9   CHLORIDE 107  --  110* 110*    CO2 PENDING  --  16* 13*   BUN PENDING  --  5* 8   CR PENDING  --  0.54 0.71   ANIONGAP PENDING  --  12 16*   KHUSHI PENDING  --  8.1* 8.4*   GLC PENDING  --  142* 85     Most Recent 2 LFT's:  Recent Labs   Lab Test 05/08/21  0617 05/07/21  1621   AST 95* 130*   ALT 97* 130*   ALKPHOS 79 95   BILITOTAL 0.8 0.9     Most Recent INR's and Anticoagulation Dosing History:  Anticoagulation Dose History     Recent Dosing and Labs Latest Ref Rng & Units 5/7/2021    INR 0.86 - 1.14 0.86        Most Recent 3 Troponin's:  Recent Labs   Lab Test 01/08/20  1526   TROPI <0.015     Most Recent Cholesterol Panel:No lab results found.  Most Recent 6 Bacteria Isolates From Any Culture (See EPIC Reports for Culture Details):No lab results found.  Most Recent TSH, T4 and A1c Labs:No lab results found.

## 2021-05-09 NOTE — PLAN OF CARE
"A/O x 4. VSS on RA except elevated BP. Denied pain. AM K+ 3.1, replaced per protocol w/ recheck 1215. AM Mg 1.0, replaced PO but pt refused PIV placement and IV replacement; pt educated on risks of low electrolytes and effects on multiple body systems, pt verbalized understanding and stated, \"I refuse to have a new IV placed for IV medications, but thank you for telling me.\" AM phosphorous 0.9, MD aware, pt informed refusal for IV placement and replacement, again, verbalizing understanding of risks of not replacing. This writer educated pt on risks of leaving AMA, pt verbalized understanding and stated, \"I choose to leave, I just can't be here anymore.\" Pt signed AMA form,  to transport home. Tele ST, denied CP . LS clear, denied SOB. No PIV access, pt informed refusal for placement. Up SBA, informed refusal for GB.    "

## 2021-05-10 LAB — INTERPRETATION ECG - MUSE: NORMAL

## 2022-12-04 ENCOUNTER — APPOINTMENT (OUTPATIENT)
Dept: CT IMAGING | Facility: CLINIC | Age: 72
End: 2022-12-04
Attending: EMERGENCY MEDICINE
Payer: COMMERCIAL

## 2022-12-04 ENCOUNTER — APPOINTMENT (OUTPATIENT)
Dept: MRI IMAGING | Facility: CLINIC | Age: 72
End: 2022-12-04
Attending: EMERGENCY MEDICINE
Payer: COMMERCIAL

## 2022-12-04 ENCOUNTER — APPOINTMENT (OUTPATIENT)
Dept: GENERAL RADIOLOGY | Facility: CLINIC | Age: 72
End: 2022-12-04
Attending: PHYSICIAN ASSISTANT
Payer: COMMERCIAL

## 2022-12-04 ENCOUNTER — HOSPITAL ENCOUNTER (OUTPATIENT)
Facility: CLINIC | Age: 72
Setting detail: OBSERVATION
Discharge: HOME | End: 2022-12-05
Attending: EMERGENCY MEDICINE | Admitting: INTERNAL MEDICINE
Payer: COMMERCIAL

## 2022-12-04 DIAGNOSIS — U07.1 INFECTION DUE TO 2019 NOVEL CORONAVIRUS: ICD-10-CM

## 2022-12-04 DIAGNOSIS — S22.080A COMPRESSION FRACTURE OF T12 VERTEBRA, INITIAL ENCOUNTER (H): Primary | ICD-10-CM

## 2022-12-04 DIAGNOSIS — N39.0 E. COLI UTI: ICD-10-CM

## 2022-12-04 DIAGNOSIS — B96.20 E. COLI UTI: ICD-10-CM

## 2022-12-04 DIAGNOSIS — F10.931 ALCOHOL WITHDRAWAL SYNDROME, WITH DELIRIUM (H): Primary | ICD-10-CM

## 2022-12-04 DIAGNOSIS — S22.080A T12 COMPRESSION FRACTURE, INITIAL ENCOUNTER (H): ICD-10-CM

## 2022-12-04 DIAGNOSIS — N30.00 ACUTE CYSTITIS WITHOUT HEMATURIA: ICD-10-CM

## 2022-12-04 LAB
ALBUMIN SERPL BCG-MCNC: 4.3 G/DL (ref 3.5–5.2)
ALBUMIN UR-MCNC: 70 MG/DL
ALP SERPL-CCNC: 102 U/L (ref 35–104)
ALT SERPL W P-5'-P-CCNC: 12 U/L (ref 10–35)
ANION GAP SERPL CALCULATED.3IONS-SCNC: 22 MMOL/L (ref 7–15)
APPEARANCE UR: ABNORMAL
AST SERPL W P-5'-P-CCNC: 47 U/L (ref 10–35)
BACTERIA #/AREA URNS HPF: ABNORMAL /HPF
BASOPHILS # BLD AUTO: 0.1 10E3/UL (ref 0–0.2)
BASOPHILS NFR BLD AUTO: 2 %
BILIRUB SERPL-MCNC: 0.5 MG/DL
BILIRUB UR QL STRIP: ABNORMAL
BUN SERPL-MCNC: 10.9 MG/DL (ref 8–23)
CALCIUM SERPL-MCNC: 11.1 MG/DL (ref 8.8–10.2)
CHLORIDE SERPL-SCNC: 90 MMOL/L (ref 98–107)
COLOR UR AUTO: ABNORMAL
CREAT SERPL-MCNC: 0.59 MG/DL (ref 0.51–0.95)
DEPRECATED HCO3 PLAS-SCNC: 22 MMOL/L (ref 22–29)
EOSINOPHIL # BLD AUTO: 0 10E3/UL (ref 0–0.7)
EOSINOPHIL NFR BLD AUTO: 0 %
ERYTHROCYTE [DISTWIDTH] IN BLOOD BY AUTOMATED COUNT: 12.8 % (ref 10–15)
ETHANOL SERPL-MCNC: <0.01 G/DL
GFR SERPL CREATININE-BSD FRML MDRD: >90 ML/MIN/1.73M2
GLUCOSE SERPL-MCNC: 155 MG/DL (ref 70–99)
GLUCOSE UR STRIP-MCNC: NEGATIVE MG/DL
HCT VFR BLD AUTO: 38.2 % (ref 35–47)
HGB BLD-MCNC: 13.1 G/DL (ref 11.7–15.7)
HGB UR QL STRIP: ABNORMAL
HOLD SPECIMEN: NORMAL
HOLD SPECIMEN: NORMAL
HYALINE CASTS: 64 /LPF
IMM GRANULOCYTES # BLD: 0.1 10E3/UL
IMM GRANULOCYTES NFR BLD: 2 %
KETONES UR STRIP-MCNC: 20 MG/DL
LEUKOCYTE ESTERASE UR QL STRIP: ABNORMAL
LYMPHOCYTES # BLD AUTO: 0.8 10E3/UL (ref 0.8–5.3)
LYMPHOCYTES NFR BLD AUTO: 15 %
MAGNESIUM SERPL-MCNC: 1.2 MG/DL (ref 1.7–2.3)
MCH RBC QN AUTO: 38.2 PG (ref 26.5–33)
MCHC RBC AUTO-ENTMCNC: 34.3 G/DL (ref 31.5–36.5)
MCV RBC AUTO: 111 FL (ref 78–100)
MONOCYTES # BLD AUTO: 0.6 10E3/UL (ref 0–1.3)
MONOCYTES NFR BLD AUTO: 11 %
MUCOUS THREADS #/AREA URNS LPF: PRESENT /LPF
NEUTROPHILS # BLD AUTO: 3.7 10E3/UL (ref 1.6–8.3)
NEUTROPHILS NFR BLD AUTO: 70 %
NITRATE UR QL: NEGATIVE
NRBC # BLD AUTO: 0 10E3/UL
NRBC BLD AUTO-RTO: 1 /100
PH UR STRIP: 5.5 [PH] (ref 5–7)
PHOSPHATE SERPL-MCNC: 2 MG/DL (ref 2.5–4.5)
PHOSPHATE SERPL-MCNC: 2.3 MG/DL (ref 2.5–4.5)
PLATELET # BLD AUTO: 310 10E3/UL (ref 150–450)
POTASSIUM SERPL-SCNC: 3.1 MMOL/L (ref 3.4–5.3)
POTASSIUM SERPL-SCNC: 3.6 MMOL/L (ref 3.4–5.3)
PROT SERPL-MCNC: 7.3 G/DL (ref 6.4–8.3)
RBC # BLD AUTO: 3.43 10E6/UL (ref 3.8–5.2)
RBC URINE: 8 /HPF
SARS-COV-2 RNA RESP QL NAA+PROBE: POSITIVE
SODIUM SERPL-SCNC: 134 MMOL/L (ref 136–145)
SP GR UR STRIP: 1.03 (ref 1–1.03)
SQUAMOUS EPITHELIAL: 1 /HPF
UROBILINOGEN UR STRIP-MCNC: 3 MG/DL
WBC # BLD AUTO: 5.2 10E3/UL (ref 4–11)
WBC URINE: >182 /HPF

## 2022-12-04 PROCEDURE — G0463 HOSPITAL OUTPT CLINIC VISIT: HCPCS | Performed by: PHYSICIAN ASSISTANT

## 2022-12-04 PROCEDURE — 70450 CT HEAD/BRAIN W/O DYE: CPT

## 2022-12-04 PROCEDURE — 250N000011 HC RX IP 250 OP 636: Performed by: INTERNAL MEDICINE

## 2022-12-04 PROCEDURE — 99285 EMERGENCY DEPT VISIT HI MDM: CPT | Mod: 25

## 2022-12-04 PROCEDURE — 96365 THER/PROPH/DIAG IV INF INIT: CPT

## 2022-12-04 PROCEDURE — 80053 COMPREHEN METABOLIC PANEL: CPT | Performed by: EMERGENCY MEDICINE

## 2022-12-04 PROCEDURE — 72131 CT LUMBAR SPINE W/O DYE: CPT

## 2022-12-04 PROCEDURE — 36415 COLL VENOUS BLD VENIPUNCTURE: CPT | Performed by: INTERNAL MEDICINE

## 2022-12-04 PROCEDURE — 72128 CT CHEST SPINE W/O DYE: CPT

## 2022-12-04 PROCEDURE — 36415 COLL VENOUS BLD VENIPUNCTURE: CPT | Performed by: EMERGENCY MEDICINE

## 2022-12-04 PROCEDURE — 99220 PR INITIAL OBSERVATION CARE,LEVEL III: CPT | Performed by: INTERNAL MEDICINE

## 2022-12-04 PROCEDURE — 83735 ASSAY OF MAGNESIUM: CPT | Performed by: INTERNAL MEDICINE

## 2022-12-04 PROCEDURE — 87086 URINE CULTURE/COLONY COUNT: CPT | Performed by: EMERGENCY MEDICINE

## 2022-12-04 PROCEDURE — 72100 X-RAY EXAM L-S SPINE 2/3 VWS: CPT

## 2022-12-04 PROCEDURE — 96367 TX/PROPH/DG ADDL SEQ IV INF: CPT

## 2022-12-04 PROCEDURE — 84100 ASSAY OF PHOSPHORUS: CPT | Performed by: INTERNAL MEDICINE

## 2022-12-04 PROCEDURE — 250N000013 HC RX MED GY IP 250 OP 250 PS 637: Performed by: EMERGENCY MEDICINE

## 2022-12-04 PROCEDURE — 81001 URINALYSIS AUTO W/SCOPE: CPT | Performed by: EMERGENCY MEDICINE

## 2022-12-04 PROCEDURE — C9803 HOPD COVID-19 SPEC COLLECT: HCPCS

## 2022-12-04 PROCEDURE — 82077 ASSAY SPEC XCP UR&BREATH IA: CPT | Performed by: EMERGENCY MEDICINE

## 2022-12-04 PROCEDURE — 96366 THER/PROPH/DIAG IV INF ADDON: CPT

## 2022-12-04 PROCEDURE — 96361 HYDRATE IV INFUSION ADD-ON: CPT

## 2022-12-04 PROCEDURE — 250N000013 HC RX MED GY IP 250 OP 250 PS 637: Performed by: INTERNAL MEDICINE

## 2022-12-04 PROCEDURE — 72146 MRI CHEST SPINE W/O DYE: CPT

## 2022-12-04 PROCEDURE — U0005 INFEC AGEN DETEC AMPLI PROBE: HCPCS | Performed by: EMERGENCY MEDICINE

## 2022-12-04 PROCEDURE — G0378 HOSPITAL OBSERVATION PER HR: HCPCS

## 2022-12-04 PROCEDURE — 84132 ASSAY OF SERUM POTASSIUM: CPT | Performed by: INTERNAL MEDICINE

## 2022-12-04 PROCEDURE — 250N000011 HC RX IP 250 OP 636: Performed by: EMERGENCY MEDICINE

## 2022-12-04 PROCEDURE — 85025 COMPLETE CBC W/AUTO DIFF WBC: CPT | Performed by: EMERGENCY MEDICINE

## 2022-12-04 RX ORDER — CEFTRIAXONE 1 G/1
1 INJECTION, POWDER, FOR SOLUTION INTRAMUSCULAR; INTRAVENOUS ONCE
Status: COMPLETED | OUTPATIENT
Start: 2022-12-04 | End: 2022-12-04

## 2022-12-04 RX ORDER — LIDOCAINE 4 G/G
1 PATCH TOPICAL ONCE
Status: COMPLETED | OUTPATIENT
Start: 2022-12-04 | End: 2022-12-05

## 2022-12-04 RX ORDER — DILTIAZEM HYDROCHLORIDE 120 MG/1
120 CAPSULE, EXTENDED RELEASE ORAL DAILY
COMMUNITY

## 2022-12-04 RX ORDER — ACETAMINOPHEN 650 MG/1
650 SUPPOSITORY RECTAL EVERY 6 HOURS PRN
Status: DISCONTINUED | OUTPATIENT
Start: 2022-12-04 | End: 2022-12-05 | Stop reason: HOSPADM

## 2022-12-04 RX ORDER — POLYETHYLENE GLYCOL 3350 17 G/17G
17 POWDER, FOR SOLUTION ORAL DAILY PRN
Status: DISCONTINUED | OUTPATIENT
Start: 2022-12-04 | End: 2022-12-05 | Stop reason: HOSPADM

## 2022-12-04 RX ORDER — ASPIRIN 81 MG/1
81 TABLET ORAL DAILY
COMMUNITY

## 2022-12-04 RX ORDER — FOLIC ACID 1 MG/1
1 TABLET ORAL DAILY
Status: DISCONTINUED | OUTPATIENT
Start: 2022-12-04 | End: 2022-12-05 | Stop reason: HOSPADM

## 2022-12-04 RX ORDER — CLOPIDOGREL BISULFATE 75 MG/1
75 TABLET ORAL DAILY
COMMUNITY
End: 2024-04-27

## 2022-12-04 RX ORDER — AMOXICILLIN 250 MG
2 CAPSULE ORAL 2 TIMES DAILY
Status: DISCONTINUED | OUTPATIENT
Start: 2022-12-04 | End: 2022-12-05 | Stop reason: HOSPADM

## 2022-12-04 RX ORDER — METOPROLOL SUCCINATE 50 MG/1
50 TABLET, EXTENDED RELEASE ORAL DAILY
COMMUNITY
End: 2024-04-27

## 2022-12-04 RX ORDER — SODIUM CHLORIDE AND POTASSIUM CHLORIDE 150; 900 MG/100ML; MG/100ML
INJECTION, SOLUTION INTRAVENOUS CONTINUOUS
Status: DISPENSED | OUTPATIENT
Start: 2022-12-04 | End: 2022-12-05

## 2022-12-04 RX ORDER — CEFTRIAXONE 2 G/1
2 INJECTION, POWDER, FOR SOLUTION INTRAMUSCULAR; INTRAVENOUS EVERY 24 HOURS
Status: DISCONTINUED | OUTPATIENT
Start: 2022-12-05 | End: 2022-12-05 | Stop reason: HOSPADM

## 2022-12-04 RX ORDER — POTASSIUM CHLORIDE 1500 MG/1
40 TABLET, EXTENDED RELEASE ORAL ONCE
Status: COMPLETED | OUTPATIENT
Start: 2022-12-04 | End: 2022-12-04

## 2022-12-04 RX ORDER — ACETAMINOPHEN 325 MG/1
650 TABLET ORAL EVERY 6 HOURS PRN
Status: DISCONTINUED | OUTPATIENT
Start: 2022-12-04 | End: 2022-12-05 | Stop reason: HOSPADM

## 2022-12-04 RX ORDER — MAGNESIUM SULFATE HEPTAHYDRATE 40 MG/ML
4 INJECTION, SOLUTION INTRAVENOUS ONCE
Status: COMPLETED | OUTPATIENT
Start: 2022-12-04 | End: 2022-12-04

## 2022-12-04 RX ORDER — TEMAZEPAM 7.5 MG/1
7.5 CAPSULE ORAL AT BEDTIME
Status: DISCONTINUED | OUTPATIENT
Start: 2022-12-04 | End: 2022-12-05 | Stop reason: HOSPADM

## 2022-12-04 RX ORDER — AMOXICILLIN 250 MG
1 CAPSULE ORAL 2 TIMES DAILY
Status: DISCONTINUED | OUTPATIENT
Start: 2022-12-04 | End: 2022-12-05 | Stop reason: HOSPADM

## 2022-12-04 RX ORDER — OXYCODONE HYDROCHLORIDE 5 MG/1
5 TABLET ORAL EVERY 4 HOURS PRN
Status: DISCONTINUED | OUTPATIENT
Start: 2022-12-04 | End: 2022-12-05 | Stop reason: HOSPADM

## 2022-12-04 RX ORDER — ONDANSETRON 2 MG/ML
4 INJECTION INTRAMUSCULAR; INTRAVENOUS EVERY 6 HOURS PRN
Status: DISCONTINUED | OUTPATIENT
Start: 2022-12-04 | End: 2022-12-05 | Stop reason: HOSPADM

## 2022-12-04 RX ORDER — ONDANSETRON 4 MG/1
4 TABLET, ORALLY DISINTEGRATING ORAL EVERY 6 HOURS PRN
Status: DISCONTINUED | OUTPATIENT
Start: 2022-12-04 | End: 2022-12-05 | Stop reason: HOSPADM

## 2022-12-04 RX ADMIN — TEMAZEPAM 7.5 MG: 7.5 CAPSULE ORAL at 21:54

## 2022-12-04 RX ADMIN — FOLIC ACID 1 MG: 1 TABLET ORAL at 20:24

## 2022-12-04 RX ADMIN — OXYCODONE HYDROCHLORIDE 2.5 MG: 5 TABLET ORAL at 13:50

## 2022-12-04 RX ADMIN — POTASSIUM CHLORIDE AND SODIUM CHLORIDE: 900; 150 INJECTION, SOLUTION INTRAVENOUS at 20:24

## 2022-12-04 RX ADMIN — CEFTRIAXONE 1 G: 1 INJECTION, POWDER, FOR SOLUTION INTRAMUSCULAR; INTRAVENOUS at 13:44

## 2022-12-04 RX ADMIN — LIDOCAINE 1 PATCH: 246 PATCH TOPICAL at 12:10

## 2022-12-04 RX ADMIN — MAGNESIUM SULFATE HEPTAHYDRATE 4 G: 40 INJECTION, SOLUTION INTRAVENOUS at 20:28

## 2022-12-04 RX ADMIN — POTASSIUM CHLORIDE 40 MEQ: 1500 TABLET, EXTENDED RELEASE ORAL at 13:51

## 2022-12-04 RX ADMIN — THIAMINE HCL TAB 100 MG 100 MG: 100 TAB at 20:24

## 2022-12-04 ASSESSMENT — ACTIVITIES OF DAILY LIVING (ADL)
ADLS_ACUITY_SCORE: 41

## 2022-12-04 ASSESSMENT — ENCOUNTER SYMPTOMS
NUMBNESS: 0
DIZZINESS: 1
BACK PAIN: 1
NAUSEA: 1

## 2022-12-04 NOTE — PHARMACY-ADMISSION MEDICATION HISTORY
Admission medication history interview status for this patient is complete. See [facility] admission navigator for allergy information, prior to admission medications and immunization status.     Medication history interview done, indicate source(s): Patient and surescripts  Medication history resources (including written lists, pill bottles, clinic record):pt brought home meds  Pharmacy: [facility] [id number]    Changes made to PTA medication list:  Added: diltiazem, omeprazole, metoprolol, clopidogrel, aspirin  Changed: lisinopril from 5mg to 20mg  Reported as Not Taking: atenolol, magnesium, vit B1  Removed: atenolol, magnesium, vit B1    Actions taken by pharmacist (provider contacted, etc):reviwed chart, spoke to the pt, looked at home vials     Additional medication history information:pt brought home meds with. Pt rarely takes folic acid ir calcium carbonate.    Medication reconciliation/reorder completed by provider prior to medication history?  no             Prior to Admission medications    Medication Sig Last Dose Taking? Auth Provider Long Term End Date   acetaminophen (TYLENOL) 325 MG tablet Take 325-650 mg by mouth every 8 hours as needed for mild pain 12/4/2022 at am Yes Unknown, Entered By History     aspirin 81 MG EC tablet Take 81 mg by mouth daily 12/4/2022 at am Yes Unknown, Entered By History     calcium carbonate (OS-KHUSHI) 500 MG tablet Take 1 tablet by mouth daily Past Month at na Yes Unknown, Entered By History     clopidogrel (PLAVIX) 75 MG tablet Take 75 mg by mouth daily 12/4/2022 at am Yes Unknown, Entered By History Yes    diltiazem ER (DILT-XR) 120 MG 24 hr capsule Take 120 mg by mouth daily 12/4/2022 at am Yes Unknown, Entered By History Yes    folic acid (FOLVITE) 1 MG tablet Take 1 tablet (1 mg) by mouth daily Past Month at na Yes Justin Roblero MD     lisinopril (ZESTRIL) 20 MG tablet Take 20 mg by mouth daily 12/4/2022 at am Yes Unknown, Entered By History No    metoprolol succinate ER  (TOPROL XL) 50 MG 24 hr tablet Take 50 mg by mouth daily 12/4/2022 at am Yes Unknown, Entered By History Yes    omeprazole (PRILOSEC) 20 MG DR capsule Take 40 mg by mouth daily before breakfast 12/4/2022 at am Yes Unknown, Entered By History

## 2022-12-04 NOTE — ED PROVIDER NOTES
History   Chief Complaint:  Back Pain    The history is provided by the patient and the spouse.      Rizwana Aguilar is a 72 year old female with history of hypertension who presents with lower back pain after a fall. 12 days ago she fell due to dizziness and nausea which resulted in lower back pain. She did hit her head when she fell. She has chronic nausea which she attributes to prolonged use of aleve over 20 years. She has been resting on the couch since her fall due to increased back pain with exertion. Her  states that he is unable to care for her as she is not able to ambulate due to her pain. She has been incontinent due to loss of feeling but has been slowly regaining feeling. She denies leg pain or numbness. She drinks 2-4 alcoholic drinks a day but denies drinking today. She has been taking Tylenol.     Review of Systems   Gastrointestinal: Positive for nausea.   Musculoskeletal: Positive for back pain.   Neurological: Positive for dizziness. Negative for numbness.   All other systems reviewed and are negative.    Allergies:  Penicillins    Medications:  Cardizem  Plavix  Mybetriq  Aspirin 81 mg  Zofran  Antivert  Zestril  Prilosec    Past Medical History:     Alcohol withdrawal  Acute pancreatitis  Alcoholic ketoacidosis    Paroxysmal atrial fibrillation  Depression  Alcoholic cirrhosis of liver without ascites  Hypertension  Malignant neoplasm of overlapping sites of left breast in female, estrogen receptor positive  IBS  Hypomagnesemia  Hypercalcemia  Hypokalemia    Past Surgical History:    Ovary removal   Cataract removal x2  Breast biopsy  Breast lumpectomy      Family History:    Father; cataract, high blood pressure  Mother: cataract, high blood pressure  Daughter: breast cancer    Social History:  The patient presents to the ED with her .   Living Situation: lives with her   Alcohol Use:  2-4 drinks a day   PCP: Adry Chery     Physical Exam     Patient Vitals for the past 24  hrs:   BP Temp Temp src Pulse Resp SpO2   12/04/22 1600 (!) 136/92 -- -- 94 16 100 %   12/04/22 1430 (!) 123/95 -- -- 98 -- 100 %   12/04/22 1353 -- -- -- -- -- 100 %   12/04/22 1346 103/87 -- -- 90 -- --   12/04/22 1236 106/85 -- -- -- -- 100 %   12/04/22 1205 -- -- -- -- -- 99 %   12/04/22 1203 131/88 -- -- 78 -- --   12/04/22 1038 113/72 98.1  F (36.7  C) Oral 69 18 98 %     Physical Exam  Nursing note and vitals reviewed.  HENT:   Mouth/Throat: Moist mucous membranes.   Eyes: EOMI, nonicteric sclera  Cardiovascular: Normal rate, regular rhythm, no murmurs, rubs, or gallops  Pulmonary/Chest: Effort normal and breath sounds normal. No respiratory distress. No wheezes. No rales.   Abdominal: Soft. Nontender, nondistended, no guarding or rigidity.   Musculoskeletal: Normal range of motion. Painful back flexion. Pain to palpation lower thoracic/upper lumbar vertebrae slightly right of midline without evidence of bruising.   Straight leg raise negative bilaterally.   Neurological: Alert. Moves all extremities spontaneously.   Normal sensation bilateral lower extremities.   Equal strength DF/PF, EHL/FHL.   Skin: Skin is warm and dry. No rash noted.   Psychiatric: Normal mood and affect.     Emergency Department Course     Imaging:  Thoracic spine MRI w/o contrast   Final Result   IMPRESSION:      1.  Acute T12 compression fracture (40% height loss) with 4 mm retropulsion resulting in mild spinal canal stenosis.      2.  Chronic T7, T8, L1 vertebral body fractures.      Lumbar spine CT w/o contrast   Final Result   IMPRESSION:   THORACIC SPINE CT:   1.  Moderate 40% compression fracture at T12 with mild retropulsion of the posterior cortex, likely acute or subacute with associated mild paraspinal contusion.   2.  Mild superior endplate compression deformity at T7, age-indeterminate, but favored to be chronic.      LUMBAR SPINE CT:   1.  Chronic 40% anterior compression deformity at L1. No acute fracture.   2.   Spondylosis with spinal canal and foraminal narrowing as detailed.            CT Thoracic Spine w/o Contrast   Final Result   IMPRESSION:   THORACIC SPINE CT:   1.  Moderate 40% compression fracture at T12 with mild retropulsion of the posterior cortex, likely acute or subacute with associated mild paraspinal contusion.   2.  Mild superior endplate compression deformity at T7, age-indeterminate, but favored to be chronic.      LUMBAR SPINE CT:   1.  Chronic 40% anterior compression deformity at L1. No acute fracture.   2.  Spondylosis with spinal canal and foraminal narrowing as detailed.            Head CT w/o contrast   Final Result   IMPRESSION:   1.  No CT evidence for acute intracranial process.   2.  Brain atrophy and presumed chronic microvascular ischemic changes as above.        Imaging independently reviewed and agree with radiologist interpretation.     Laboratory:  Labs Ordered and Resulted from Time of ED Arrival to Time of ED Departure   COMPREHENSIVE METABOLIC PANEL - Abnormal       Result Value    Sodium 134 (*)     Potassium 3.1 (*)     Chloride 90 (*)     Carbon Dioxide (CO2) 22      Anion Gap 22 (*)     Urea Nitrogen 10.9      Creatinine 0.59      Calcium 11.1 (*)     Glucose 155 (*)     Alkaline Phosphatase 102      AST 47 (*)     ALT 12      Protein Total 7.3      Albumin 4.3      Bilirubin Total 0.5      GFR Estimate >90     ROUTINE UA WITH MICROSCOPIC REFLEX TO CULTURE - Abnormal    Color Urine Orange (*)     Appearance Urine Slightly Cloudy (*)     Glucose Urine Negative      Bilirubin Urine Small (*)     Ketones Urine 20 (*)     Specific Gravity Urine 1.030      Blood Urine Trace (*)     pH Urine 5.5      Protein Albumin Urine 70 (*)     Urobilinogen Urine 3.0 (*)     Nitrite Urine Negative      Leukocyte Esterase Urine Large (*)     Bacteria Urine Many (*)     Mucus Urine Present (*)     RBC Urine 8 (*)     WBC Urine >182 (*)     Squamous Epithelials Urine 1      Hyaline Casts Urine 64 (*)     CBC WITH PLATELETS AND DIFFERENTIAL - Abnormal    WBC Count 5.2      RBC Count 3.43 (*)     Hemoglobin 13.1      Hematocrit 38.2       (*)     MCH 38.2 (*)     MCHC 34.3      RDW 12.8      Platelet Count 310      % Neutrophils 70      % Lymphocytes 15      % Monocytes 11      % Eosinophils 0      % Basophils 2      % Immature Granulocytes 2      NRBCs per 100 WBC 1 (*)     Absolute Neutrophils 3.7      Absolute Lymphocytes 0.8      Absolute Monocytes 0.6      Absolute Eosinophils 0.0      Absolute Basophils 0.1      Absolute Immature Granulocytes 0.1      Absolute NRBCs 0.0     COVID-19 VIRUS (CORONAVIRUS) BY PCR - Abnormal    SARS CoV2 PCR Positive (*)    ETHYL ALCOHOL LEVEL - Normal    Alcohol ethyl <0.01     URINE CULTURE     Emergency Department Course:    Reviewed:  I reviewed nursing notes, vitals, past medical history and Care Everywhere    Assessments:  1134 I obtained history and examined the patient as noted above.   1555 I rechecked the patient and explained findings.     Consults:  7560 I spoke with Danial Aguilar PA-C neurosurgery, regarding the patient.    I spoke with Dr. Carnes, hospitalist, who accepts the patient for admission.     Interventions:  1210 Lidocare 1 patch transdermal   1344 Rocephin 1g IV  1350 Roxicodone 2.5 mg PO  1351 Klorcon M 40 mEq PO    Disposition:  The patient was admitted to the hospital under the care of Dr. Carnes.     Impression & Plan     Medical Decision Making:  Patient presents with chief complaint low back pain.  Onset of symptoms after a fall greater than 1 week ago.  She has tenderness to palpation on exam in the lower thoracic/upper lumbar vertebrae prompting CT of the thoracic and lumbar spine.  She also reports incontinence of urine.  CT shows an acute fracture of T12 with mild retropulsion.  MRI obtained given complaints of incontinence, and fortunately negative for any spinal cord injury.  Urinalysis is suggestive of UTI and Rocephin initiated.   COVID-19 positive, but patient is asymptomatic.  Admission indicated for further evaluation and treatment.  After some discussion with patient and her  they are in agreement with the plan.  She is admitted in stable condition.  Dr. Carnes from our hospitalist service accepts for admission.    Diagnosis:    ICD-10-CM    1. Acute cystitis without hematuria  N30.00       2. T12 compression fracture, initial encounter (H)  S22.080A       3. Infection due to 2019 novel coronavirus  U07.1           Scribe Disclosure:  ANA, RHETT LIRA, am serving as a scribe at 11:34 AM on 12/4/2022 to document services personally performed by Mehrdad Maharaj MD based on my observations and the provider's statements to me.        Mehrdad Lorenzana MD  12/04/22 2125

## 2022-12-04 NOTE — ED NOTES
[facility]  ED Nurse Handoff Report    Rizwana Aguilar is a 72 year old female   ED Chief complaint: Back Pain  . ED Diagnosis:   Final diagnoses:   None     Allergies:   Allergies   Allergen Reactions    Penicillins Unknown       Code Status: Full Code  Activity level - Baseline/Home:  Stand by Assist, uses cane at baseline. Activity Level - Current:   Assist X 1. Lift room needed: No. Bariatric: No   Needed: No   Isolation: No. Infection: Not Applicable.     Vital Signs:   Vitals:    12/04/22 1236 12/04/22 1346 12/04/22 1353 12/04/22 1430   BP: 106/85 103/87  (!) 123/95   Pulse:  90  98   Resp:       Temp:       TempSrc:       SpO2: 100%  100% 100%       Cardiac Rhythm:  ,      Pain level:    Patient confused: No. Patient Falls Risk: Yes.   Elimination Status:  Quick cath done for UA, brief in place. Incontinent at baseline    Patient Report - Initial Complaint: Back pain. Focused Assessment: - Per pt, incontinent at baseline. Wears a brief. Also c/o urinary frequency and burning with urination. Hx of frequent UTIs. Musculoskeletal- C/o mid back pain since a fall a week ago. Pt states she was nauseous, which caused the fall. Has had increasing difficulty getting around and weakness since.   Tests Performed: Labs, CTs, MRI. Abnormal Results:   Labs Ordered and Resulted from Time of ED Arrival to Time of ED Departure   COMPREHENSIVE METABOLIC PANEL - Abnormal       Result Value    Sodium 134 (*)     Potassium 3.1 (*)     Chloride 90 (*)     Carbon Dioxide (CO2) 22      Anion Gap 22 (*)     Urea Nitrogen 10.9      Creatinine 0.59      Calcium 11.1 (*)     Glucose 155 (*)     Alkaline Phosphatase 102      AST 47 (*)     ALT 12      Protein Total 7.3      Albumin 4.3      Bilirubin Total 0.5      GFR Estimate >90     ROUTINE UA WITH MICROSCOPIC REFLEX TO CULTURE - Abnormal    Color Urine Orange (*)     Appearance Urine Slightly Cloudy (*)     Glucose Urine Negative      Bilirubin Urine Small (*)      Ketones Urine 20 (*)     Specific Gravity Urine 1.030      Blood Urine Trace (*)     pH Urine 5.5      Protein Albumin Urine 70 (*)     Urobilinogen Urine 3.0 (*)     Nitrite Urine Negative      Leukocyte Esterase Urine Large (*)     Bacteria Urine Many (*)     Mucus Urine Present (*)     RBC Urine 8 (*)     WBC Urine >182 (*)     Squamous Epithelials Urine 1      Hyaline Casts Urine 64 (*)    CBC WITH PLATELETS AND DIFFERENTIAL - Abnormal    WBC Count 5.2      RBC Count 3.43 (*)     Hemoglobin 13.1      Hematocrit 38.2       (*)     MCH 38.2 (*)     MCHC 34.3      RDW 12.8      Platelet Count 310      % Neutrophils 70      % Lymphocytes 15      % Monocytes 11      % Eosinophils 0      % Basophils 2      % Immature Granulocytes 2      NRBCs per 100 WBC 1 (*)     Absolute Neutrophils 3.7      Absolute Lymphocytes 0.8      Absolute Monocytes 0.6      Absolute Eosinophils 0.0      Absolute Basophils 0.1      Absolute Immature Granulocytes 0.1      Absolute NRBCs 0.0     ETHYL ALCOHOL LEVEL - Normal    Alcohol ethyl <0.01     URINE CULTURE     Lumbar spine CT w/o contrast   Final Result   IMPRESSION:   THORACIC SPINE CT:   1.  Moderate 40% compression fracture at T12 with mild retropulsion of the posterior cortex, likely acute or subacute with associated mild paraspinal contusion.   2.  Mild superior endplate compression deformity at T7, age-indeterminate, but favored to be chronic.      LUMBAR SPINE CT:   1.  Chronic 40% anterior compression deformity at L1. No acute fracture.   2.  Spondylosis with spinal canal and foraminal narrowing as detailed.            CT Thoracic Spine w/o Contrast   Final Result   IMPRESSION:   THORACIC SPINE CT:   1.  Moderate 40% compression fracture at T12 with mild retropulsion of the posterior cortex, likely acute or subacute with associated mild paraspinal contusion.   2.  Mild superior endplate compression deformity at T7, age-indeterminate, but favored to be chronic.       LUMBAR SPINE CT:   1.  Chronic 40% anterior compression deformity at L1. No acute fracture.   2.  Spondylosis with spinal canal and foraminal narrowing as detailed.            Head CT w/o contrast   Final Result   IMPRESSION:   1.  No CT evidence for acute intracranial process.   2.  Brain atrophy and presumed chronic microvascular ischemic changes as above.      Thoracic spine MRI w/o contrast    (Results Pending)     .   Treatments provided: lidocaine patch to back, 1 gram rocephin, 40 mEq K+,2.5 mg oxycodone  Family Comments:  at bedside  OBS brochure/video discussed/provided to patient:  N/A  ED Medications:   Medications   Lidocaine (LIDOCARE) 4 % Patch 1 patch (1 patch Transdermal Patch/Med Applied 12/4/22 1210)   cefTRIAXone (ROCEPHIN) 1 g vial to attach to  mL bag for ADULTS or NS 50 mL bag for PEDS (0 g Intravenous Stopped 12/4/22 1439)   potassium chloride ER (KLOR-CON M) CR tablet 40 mEq (40 mEq Oral Given 12/4/22 1351)   oxyCODONE IR (ROXICODONE) half-tab 2.5 mg (2.5 mg Oral Given 12/4/22 1350)     Drips infusing:  No  For the majority of the shift, the patient's behavior Green. Interventions performed were N/A.    Sepsis treatment initiated: No     Patient tested for COVID 19 prior to admission: YES    ED Nurse Name/Phone Number: Mickie Lopez RN,   2:49 PM

## 2022-12-04 NOTE — ED NOTES
"Patient and her  bickering throughout triage.  had a laundry list of things the are \"wrong\" with his wife.  As  was listing off his concerns, patient arguing with him that those things are not wrong with.  appears irritated by patient. Patient has been waiting for 35 mins,  has been up to desk 3 times complaining of wait time, and telling staff that they should not wait.   "

## 2022-12-04 NOTE — CONSULTS
NEUROSURGERY CONSULT    Neurosurgery was asked by Dr. Lorenzana to consult for a T12 fracture.      PLAN:  Ms. Aguilar's most recent imaging exhibits an acute T12 compression fracture (40% height loss) with 4 mm retropulsion resulting in mild spinal canal stenosis as well as chronic T7, T8, L1 vertebral body fractures.     Based on her physical exam and imaging review, we do not see any indication for surgical intervention. We will have Orthotics fit and supply her with an off the shelf TLSO. We instructed the patient to wear the brace when out of bed, but may shower without the brace on. The brace will most likely be required for 3 months. We will also obtian upright ap/lat lumbar x-rays to include the T12 fracture prior to discharge.     For now, we feel that it would be in her best interest to proceed with a conservative approach by pain management set forth by the Medical team here at [facility]. We do suggest that she not lift anything greater than 5-10 pounds for six weeks and avoid excessive bending, twisting, and turning.    We did review the images together and we explained his condition and the recommended treatment. We will have our schedule team facilitate a follow-up in one month with updated imaging.     We did discuss signs of a worsening problem that she should seek being evaluated.     Please page our on-call service for any questions or concerns.     It has been a pleasure meeting Rizwana Aguilar. Thank you for having us be involved in her care.    ______________________________________________________________________    HPI:    Rizwana Aguilar is a pleasant 72 year old female who presented to the [facility] Emergency Department for consultation on back pain s/p fall. Apparently this fall occurred approximately 12 days ago as she was feeling dizzy. She did hit her head during this fall but did not lose consciousness. Activity, specifically ambulation, seems to bother her the most and lying on her  couch alleviates her pain. Initially, 12 days ago, she was also experiencing urinary incontinence, but this has since been improving. No other concerns are voiced.    Past Medical History:   Diagnosis Date     Hypertension        Past Surgical History:   Procedure Laterality Date     GYN SURGERY         Allergies   Allergen Reactions     Penicillins Unknown       Social History     Tobacco Use     Smoking status: Never     Smokeless tobacco: Never   Substance Use Topics     Alcohol use: Not on file       No family history on file.    Scheduled Medications      lidocaine  1 patch Transdermal Once       Home Medications  Cardizem  Plavix  Mybetriq  Aspirin 81 mg  Zofran  Antivert  Zestril  Prilosec    ROS: 10 point ROS neg other than the symptoms noted above in the HPI.    Vitals:    BP (!) 123/95   Pulse 98   Temp 98.1  F (36.7  C) (Oral)   Resp 18   SpO2 100%   There is no height or weight on file to calculate BMI.  No intake/output data recorded.    Exam:    Patient appears comfortable, conversational, and in no apparent distress.   Head: Normocephalic, without obvious abnormality, atraumatic, no facial asymmetry.   Eyes: conjunctivae/corneas clear. PERRL, EOM's intact.   Throat: lips, mucosa, and tongue normal; teeth and gums normal.   Neck: supple, symmetrical, trachea midline, no adenopathy and thyroid: not enlarged, symmetric, no tenderness/mass/nodules.   Lungs: clear to auscultation bilaterally.   Heart: regular rate and rhythm.   Abdomen: soft, non-tender; bowel sounds normal; no masses, no organomegaly.   Pulses: 2+ and symmetric.   Skin: Skin color, texture, turgor normal. No rashes or lesions.     CN II-XII grossly intact, alert and appropriate with conversation and following commands.   Cervical spine is non tender to palpation. Appropriate range of motion of neck, not concerning for lhermitte's phenomenon.   Bilateral bicep 2/4 and tricep reflexes 1/4. Sensation intact throughout upper extremities.      UE muscle strength  Right  Left    Deltoid  5/5  5/5    Biceps  5/5  5/5    Triceps  5/5  5/5    Hand intrinsics  5/5  5/5    Hand grasp  5/5  5/5    Acevedo signs  neg  neg      Lumbar spine is tender to palpation.  Intact sensation throughout lower extremities.   Bilateral patellar 2/4 and achilles reflex 1/4.    LE muscle strength  Right  Left    Iliopsoas (hip flexion)  5/5  5/5    Quad (knee extension)  5/5  5/5    Hamstring (knee flexion)  5/5  5/5    Gastrocnemius (PF)  5/5  5/5    Tibialis Ant. (DF)  5/5  5/5    EHL  5/5  5/5      Negative Babinski bilaterally. Negative for clonus.   Calves are soft and non-tender bilaterally.     Imaging:  Impression per radiology read - I have personally reviewed the images with the patient.    CT THORACIC SPINE WITHOUT CONTRAST, CT LUMBAR SPINE WITHOUT CONTRAST - 12/04/2022, 1:35 PM    THORACIC SPINE CT:  1.  Moderate 40% compression fracture at T12 with mild retropulsion of the posterior cortex, likely acute or subacute with associated mild paraspinal contusion.  2.  Mild superior endplate compression deformity at T7, age-indeterminate, but favored to be chronic.     LUMBAR SPINE CT:  1.  Chronic 40% anterior compression deformity at L1. No acute fracture.  2.  Spondylosis with spinal canal and foraminal narrowing as detailed.    MR THORACIC SPINE W/O CONTRAST - 12/4/2022 3:28 PM  1.  Acute T12 compression fracture (40% height loss) with 4 mm retropulsion resulting in mild spinal canal stenosis.  2.  Chronic T7, T8, L1 vertebral body fractures.    Available labs at time of consult:       Recent Labs   Lab 12/04/22  1209   WBC 5.2   HGB 13.1   HCT 38.2   *        Recent Labs   Lab 12/04/22  1209   WBC 5.2   HGB 13.1   HCT 38.2   *        No results for input(s): SED, CRP in the last 168 hours.  Recent Labs   Lab 12/04/22  1209   HGB 13.1     No results for input(s): INR in the last 168 hours.  Recent Labs   Lab 12/04/22  1209         Recent Labs   Lab 12/04/22  1209   WBC 5.2         Respectfully,    SERINA Loo, MAO  [facility]  [facility]     Tel: [phone]      All imaging, physical findings, and the above plan have been reviewed with Dr. Chavez.

## 2022-12-04 NOTE — ED TRIAGE NOTES
Patient states one week ago she became nausea which caused her to fall. Patient states when she fell she injured her back. Patient arrived to the ED via EMS, EMS reports that the patient told them she was having back pain before she fell and that she has been sleeping on her cough which is why she thought she was having back pain. In triage patient states that her back pain was caused by her fall.      Triage Assessment     Row Name 12/04/22 1036       Triage Assessment (Adult)    Airway WDL WDL       Respiratory WDL    Respiratory WDL WDL       Skin Circulation/Temperature WDL    Skin Circulation/Temperature WDL WDL       Cardiac WDL    Cardiac WDL WDL       Peripheral/Neurovascular WDL    Peripheral Neurovascular WDL WDL       Cognitive/Neuro/Behavioral WDL    Cognitive/Neuro/Behavioral WDL WDL

## 2022-12-05 ENCOUNTER — APPOINTMENT (OUTPATIENT)
Dept: PHYSICAL THERAPY | Facility: CLINIC | Age: 72
End: 2022-12-05
Attending: INTERNAL MEDICINE
Payer: COMMERCIAL

## 2022-12-05 VITALS
SYSTOLIC BLOOD PRESSURE: 135 MMHG | RESPIRATION RATE: 18 BRPM | DIASTOLIC BLOOD PRESSURE: 86 MMHG | WEIGHT: 130 LBS | TEMPERATURE: 98.3 F | BODY MASS INDEX: 20.89 KG/M2 | HEART RATE: 79 BPM | OXYGEN SATURATION: 100 % | HEIGHT: 66 IN

## 2022-12-05 LAB
ANION GAP SERPL CALCULATED.3IONS-SCNC: 14 MMOL/L (ref 7–15)
BACTERIA UR CULT: ABNORMAL
BUN SERPL-MCNC: 9.4 MG/DL (ref 8–23)
CALCIUM SERPL-MCNC: 9.7 MG/DL (ref 8.8–10.2)
CHLORIDE SERPL-SCNC: 97 MMOL/L (ref 98–107)
CREAT SERPL-MCNC: 0.46 MG/DL (ref 0.51–0.95)
DEPRECATED HCO3 PLAS-SCNC: 24 MMOL/L (ref 22–29)
ERYTHROCYTE [DISTWIDTH] IN BLOOD BY AUTOMATED COUNT: 13 % (ref 10–15)
GFR SERPL CREATININE-BSD FRML MDRD: >90 ML/MIN/1.73M2
GLUCOSE SERPL-MCNC: 109 MG/DL (ref 70–99)
HCT VFR BLD AUTO: 32.3 % (ref 35–47)
HGB BLD-MCNC: 11.1 G/DL (ref 11.7–15.7)
MAGNESIUM SERPL-MCNC: 2 MG/DL (ref 1.7–2.3)
MAGNESIUM SERPL-MCNC: 2.7 MG/DL (ref 1.7–2.3)
MCH RBC QN AUTO: 38.7 PG (ref 26.5–33)
MCHC RBC AUTO-ENTMCNC: 34.4 G/DL (ref 31.5–36.5)
MCV RBC AUTO: 113 FL (ref 78–100)
PLATELET # BLD AUTO: 249 10E3/UL (ref 150–450)
POTASSIUM SERPL-SCNC: 3.7 MMOL/L (ref 3.4–5.3)
RBC # BLD AUTO: 2.87 10E6/UL (ref 3.8–5.2)
SODIUM SERPL-SCNC: 135 MMOL/L (ref 136–145)
WBC # BLD AUTO: 4.2 10E3/UL (ref 4–11)

## 2022-12-05 PROCEDURE — 250N000011 HC RX IP 250 OP 636: Performed by: INTERNAL MEDICINE

## 2022-12-05 PROCEDURE — 97161 PT EVAL LOW COMPLEX 20 MIN: CPT | Mod: GP | Performed by: PHYSICAL THERAPIST

## 2022-12-05 PROCEDURE — 250N000013 HC RX MED GY IP 250 OP 250 PS 637: Performed by: HOSPITALIST

## 2022-12-05 PROCEDURE — 97530 THERAPEUTIC ACTIVITIES: CPT | Mod: GP | Performed by: PHYSICAL THERAPIST

## 2022-12-05 PROCEDURE — 96366 THER/PROPH/DIAG IV INF ADDON: CPT

## 2022-12-05 PROCEDURE — 80048 BASIC METABOLIC PNL TOTAL CA: CPT | Performed by: INTERNAL MEDICINE

## 2022-12-05 PROCEDURE — 96361 HYDRATE IV INFUSION ADD-ON: CPT

## 2022-12-05 PROCEDURE — 99217 PR OBSERVATION CARE DISCHARGE: CPT | Performed by: HOSPITALIST

## 2022-12-05 PROCEDURE — L0464 TLSO 4MOD SACRO-SCAP PRE: HCPCS

## 2022-12-05 PROCEDURE — 83735 ASSAY OF MAGNESIUM: CPT | Performed by: HOSPITALIST

## 2022-12-05 PROCEDURE — 83735 ASSAY OF MAGNESIUM: CPT | Performed by: INTERNAL MEDICINE

## 2022-12-05 PROCEDURE — G0378 HOSPITAL OBSERVATION PER HR: HCPCS

## 2022-12-05 PROCEDURE — 250N000013 HC RX MED GY IP 250 OP 250 PS 637: Performed by: INTERNAL MEDICINE

## 2022-12-05 PROCEDURE — 85014 HEMATOCRIT: CPT | Performed by: INTERNAL MEDICINE

## 2022-12-05 PROCEDURE — 97116 GAIT TRAINING THERAPY: CPT | Mod: GP | Performed by: PHYSICAL THERAPIST

## 2022-12-05 PROCEDURE — 36415 COLL VENOUS BLD VENIPUNCTURE: CPT | Performed by: INTERNAL MEDICINE

## 2022-12-05 RX ORDER — CIPROFLOXACIN 500 MG/1
500 TABLET, FILM COATED ORAL 2 TIMES DAILY
Qty: 14 TABLET | Refills: 0 | Status: SHIPPED | OUTPATIENT
Start: 2022-12-05 | End: 2022-12-12

## 2022-12-05 RX ORDER — LANOLIN ALCOHOL/MO/W.PET/CERES
100 CREAM (GRAM) TOPICAL DAILY
Qty: 30 TABLET | Refills: 0 | Status: SHIPPED | OUTPATIENT
Start: 2022-12-05 | End: 2023-01-04

## 2022-12-05 RX ORDER — OXYCODONE HYDROCHLORIDE 5 MG/1
5 TABLET ORAL EVERY 6 HOURS PRN
Qty: 12 TABLET | Refills: 0 | Status: SHIPPED | OUTPATIENT
Start: 2022-12-05 | End: 2022-12-08

## 2022-12-05 RX ADMIN — THIAMINE HCL TAB 100 MG 100 MG: 100 TAB at 08:01

## 2022-12-05 RX ADMIN — POTASSIUM & SODIUM PHOSPHATES POWDER PACK 280-160-250 MG 1 PACKET: 280-160-250 PACK at 11:31

## 2022-12-05 RX ADMIN — POTASSIUM & SODIUM PHOSPHATES POWDER PACK 280-160-250 MG 1 PACKET: 280-160-250 PACK at 03:26

## 2022-12-05 RX ADMIN — POTASSIUM & SODIUM PHOSPHATES POWDER PACK 280-160-250 MG 1 PACKET: 280-160-250 PACK at 08:01

## 2022-12-05 RX ADMIN — OXYCODONE HYDROCHLORIDE 5 MG: 5 TABLET ORAL at 08:01

## 2022-12-05 RX ADMIN — CEFTRIAXONE 2 G: 2 INJECTION, POWDER, FOR SOLUTION INTRAMUSCULAR; INTRAVENOUS at 13:14

## 2022-12-05 RX ADMIN — ACETAMINOPHEN 650 MG: 325 TABLET, FILM COATED ORAL at 08:01

## 2022-12-05 RX ADMIN — FOLIC ACID 1 MG: 1 TABLET ORAL at 08:01

## 2022-12-05 ASSESSMENT — ACTIVITIES OF DAILY LIVING (ADL)
ADLS_ACUITY_SCORE: 42

## 2022-12-05 NOTE — PLAN OF CARE
"PRIMARY DIAGNOSIS: ACUTE BACK PAIN  OUTPATIENT/OBSERVATION GOALS TO BE MET BEFORE DISCHARGE:  1. Pain Status: Improved-controlled with oral pain medications.    2. Return to near baseline physical activity: No    3. Cleared for discharge by consultants (if involved): No    Discharge Planner Nurse   Safe discharge environment identified: No  Barriers to discharge: Yes       Entered by: Shana Eller 12/05/2022 10:07 AM     Please review provider order for any additional goals.   Nurse to notify provider when observation goals have been met and patient is ready for discharge.    BP (!) 141/94 (BP Location: Right arm)   Pulse 90   Temp 98.2  F (36.8  C) (Axillary)   Resp 20   Ht 1.676 m (5' 6\")   Wt 59 kg (130 lb)   SpO2 100%   BMI 20.98 kg/m      Special precautions maintained, AOx4, A1 w gait belt, PRN oxy and tylenol given for back pain. Ortho saw pt, orthotics is creating brace for pt, will see pt around 12. Pending PT. UC positive with E.coli, provider notified.   "

## 2022-12-05 NOTE — DISCHARGE INSTRUCTIONS
Please follow up at [facility] in 6 weeks with xray prior .  You may call the clinic with any scheduling questions or concerns.     [facility]  [address] [address]  Tel [phone]  Fax [phone]    Follow up with your PCP within a week from discharge.     Activity:  Continue to wear brace when out of bed.    Limit lifting to 10 pounds and limit bending/twisting until follow up visit.   No contact sports or high impact activities until  follow up visit.

## 2022-12-05 NOTE — H&P
Admitted: 12/04/2022    CHIEF COMPLAINT:  Recent fall.  The patient presented with ongoing low back pain.    HISTORY OF PRESENT ILLNESS:  Mrs. Aguilar is a 72-year-old female with a history of alcohol dependence, alcohol withdrawal, alcohol pancreatitis, alcoholic liver disease, hypertension, paroxysmal atrial fibrillation, breast cancer, who presented to the hospital today for the above concerns.  The patient had a recent fall approximately 1-2 weeks ago.  She hurt her back.  She had mid to low back pain.  She had difficulty mobilizing the past week because of her symptoms.  She came to the ER today as her  was unable to continue to care for her at home given her ongoing difficulty with pain and mobility.    On arrival to the ER, vital signs included a blood pressure of 113/72 with a heart rate of 70.  No fever.  Saturation 98% on room air.    Workup in the ER included labs and imaging.  CBC unremarkable.  Complete metabolic panel shows potassium 3.1.  Calcium of 11.  Liver function tests unremarkable.  BUN and AST slightly high at 47.  Alcohol level undetectable.  Urinalysis shows pyuria.  Urine culture is pending.  COVID-19 is positive.      Imaging included a head CT scan showed no acute findings.  Thoracic, lumbar spine CT scan showed a moderate 40% compression fracture at T12 with mild retropulsion likely acute or subacute.  Mild spinal canal stenosis noted on thoracic spine MRI in the area of T12 compression fracture.    I spoke with the ER provider regarding this patient.  He has already discussed the case with neurosurgery who has evaluated the patient and left a note in the chart.    Plan is for admission to observation for pain control and mobilization.    Incidentally, the patient is COVID positive.  She has been vaccinated.  She has no fevers, chills, or respiratory symptoms.    PAST MEDICAL HISTORY:   1.  Hypertension.  2.  Alcohol dependence.  3.  History of alcoholic liver disease.  4.  Alcoholic  pancreatitis.  5.  Paroxysmal atrial fibrillation.  6.  Breast cancer.  7.  History of alcohol withdrawal.    CURRENT MEDICATIONS:  Await pharmacy reconciliation medication list.    ALLERGIES:  PENICILLINS.    FAMILY HISTORY:  Reviewed.  Nothing contributory to this admission.    SOCIAL HISTORY:  The patient lives at home with her spouse.  She mobilizes with the use of a cane.  She drinks alcohol, stating that she drinks 2-4 alcoholic beverages per day.  Her last drink was last night.  A distant smoking history, none currently.  I reviewed code status with her and she wishes to be a full code.    REVIEW OF SYSTEMS:  See HPI for details.  Comprehensive greater than 10-point review of systems is otherwise negative besides that detailed above.    PHYSICAL EXAMINATION:    VITAL SIGNS:  Blood pressure is currently 136/92 with a heart rate of 94.  No fever.  Saturation 100% on room air.  GENERAL:  The patient appears nontoxic and in no acute distress.  She appears awake, alert and oriented x 3.  Mood and affect are normal and appropriate.  Appears to be a decent historian.  HEENT:  Head is atraumatic.  Sclerae white.  Eyelids normal.  Conjunctivae normal.  Extraocular movements are intact.  NECK:  Supple.  No cervical or supraclavicular lymphadenopathy.  CARDIOVASCULAR:  Heart is regular rate and rhythm.  No significant murmurs.  No lower extremity edema.  LUNGS:  Clear to auscultation bilaterally.  No intercostal retractions.  No conversational dyspnea.  ABDOMEN:  Nontender, nondistended, soft, no masses, no organomegaly.  EXTREMITIES:  Show no edema.  SKIN:  Reveals no rashes.  No jaundice.  Skin is dry to touch.  NEUROLOGIC:  Cranial nerves II-XII are intact.  Moves all extremities appropriately.  Sensation intact to light touch in the upper and lower extremities bilaterally.  Strength testing of bilateral lower extremities including dorsiflexion, plantar flexion, both 5/5 bilaterally.  She is able to raise her lower  extremities against resistance in both legs.  I did not try and ambulate the patient in the ER.  PSYCHIATRIC:  Awake, alert and oriented x 3.  Mood and affect are normal and appropriate.    LABORATORY AND IMAGING DATA:  Reviewed above in HPI.    IMPRESSION AND PLAN:  Ms. Aguilar is a 72-year-old female with a history of alcohol dependence, alcohol withdrawal, alcoholic liver disease, hypertension, paroxysmal atrial fibrillation, who presented to the hospital with back pain after a mechanical fall 1-2 weeks ago.    On arrival to the ER, vital signs have been unremarkable.  Lab workup notable for pyuria on urinalysis and hypokalemia on labs.    COVID-19 is positive.  Alcohol level is undetectable.      Imaging included thoracic spine MRI showing an acute T12 compression fracture with 4 mm retropulsion, resulting in mild spinal canal stenosis.  She also has chronic T7, T8 and L1 vertebral body fractures.  The patient was seen by neurosurgery in the ER and conservative management is recommended.    1.  Recent mechanical fall resulting in low back pain.  Imaging this evening demonstrates acute T12 compression fracture with mild spinal canal stenosis.  2.  Urinary tract infection.  The patient endorses dysuria and frequent urination the past week.  3.  Hypokalemia, possibly due to alcohol use.  4.  Hyponatremia, mild and not clinically significant.  5.  COVID positive, asymptomatic and not hypoxic.  She is vaccinated.  6.  Alcohol dependence with a history of alcohol withdrawal.  No evidence of alcohol withdrawal currently.  Reports that she drinks 2-4 drinks per day, last alcoholic beverage was approximately 24 hours ago.  Alcohol level undectable.    PLAN:     1.  Admit to observation.  2.  Neurosurgery has already been consulted and left recommendations for conservative management of her compression fracture.  3.  IV Rocephin for UTI.  4.  Monitor urine culture.  5.  Physical therapy consult.  6.  Social Work consult to  assist with disposition.  7.  Administer thiamine and folate in the setting of her alcohol dependence.  Monitor for withdrawal symptoms (none currently).  8.  IV fluids overnight.  9.  No need for treatment for COVID as she is asymptomatic and has no hypoxia.  She is vaccinated.  10.  I discussed code status with the patient this evening, she is a full code.    Jean-Pierre Carnes MD        D: 2022   T: 2022   MT: DAYN    Name:     JUAN ANTONIO HAMPTON  MRN:      [id number]        Account:     [id number]   :      1950           Admitted:    2022       Document: [id number]    cc:  Adry Chery MD

## 2022-12-05 NOTE — PROGRESS NOTES
Patient was fit with [employer] [address] tlso.  Written, verbal and physical instruction given as well as contact information.  Patient said she has no questions at this time. She will follow with clinic as needed.   Tai MCDANIELS.

## 2022-12-05 NOTE — PROGRESS NOTES
VSS, elevated diastolic. Pt oriented x 4, forgetful at times. Up at bedside assist of 1. Pt incontinent of urine. IV running NS w/ 20 meq K. Mg replaced, recheck at 0230. Phos low, protocol in order. Pt denies pain. Discharge pending.

## 2022-12-05 NOTE — PLAN OF CARE
PRIMARY DIAGNOSIS: ACUTE BACK PAIN  OUTPATIENT/OBSERVATION GOALS TO BE MET BEFORE DISCHARGE:  1. Pain Status: Improved-controlled with oral pain medications.    2. Return to near baseline physical activity: No    3. Cleared for discharge by consultants (if involved): No    Discharge Planner Nurse   Safe discharge environment identified: Yes  Barriers to discharge: Yes       Entered by: Marialuisa Cuevas RN 12/05/2022 5:24 AM     Please review provider order for any additional goals.   Nurse to notify provider when observation goals have been met and patient is ready for discharge.      Vitals are Temp: 97.9  F (36.6  C) Temp src: Oral BP: (!) 138/100 Pulse: 95   Resp: 18 SpO2: 100 %.  Patient is Alert and Oriented x4. They are 1 Assist with Gait Belt. Pt is on special precaution. Pt is a Regular diet.  They are complaining of 1/10 pain in their back.  Tylenol and Oxycodone given for pain.  Medicatons decreased pain.  Patient has Normal Saline 0.9% running at 100 mL per hour.

## 2022-12-05 NOTE — PLAN OF CARE
"PRIMARY DIAGNOSIS: ACUTE BACK PAIN  OUTPATIENT/OBSERVATION GOALS TO BE MET BEFORE DISCHARGE:  1. Pain Status: Improved-controlled with oral pain medications.    2. Return to near baseline physical activity: Yes    3. Cleared for discharge by consultants (if involved): Yes    Discharge Planner Nurse   Safe discharge environment identified: Yes  Barriers to discharge: No       Entered by: Shana Eller 12/05/2022 5:41 PM      Please review provider order for any additional goals.   Nurse to notify provider when observation goals have been met and patient is ready for discharge.    /86   Pulse 79   Temp 98.3  F (36.8  C) (Oral)   Resp 18   Ht 1.676 m (5' 6\")   Wt 59 kg (130 lb)   SpO2 100%   BMI 20.98 kg/m        Special precautions maintained, AOx4, A1 w gait belt, PRN oxy and tylenol given for back pain. Pt has obtained back brace orthotic. PT states that she can go back home with 24hr supervision which  can provide. Will discharge home with  today.    Reviewed discharge instructions and medications with patient. Questions answered. Patient discharged to home with spouse, discharge instructions, medications (abx, thiamine, oxy), and belongings.    Pt's  was angry at the fact that she was not scheduled a stretcher transport to go home with, even though patient decided with  against it. Writer explained to  that writer could go talk to with SW and get one ordered and scheduled asap.  was frustrated and said \"Well I will just take her home then.\" Pt stated to , \"Just take me home.\"  See  note additionally. Pt and  left at 5:45pm.   "

## 2022-12-05 NOTE — CONSULTS
Care Management Initial Consult    General Information  Assessment completed with: Patient, Patient  Type of CM/SW Visit: Initial Assessment    Primary Care Provider verified and updated as needed: No   Readmission within the last 30 days: no previous admission in last 30 days      Reason for Consult: discharge planning  Advance Care Planning:            Communication Assessment  Patient's communication style: spoken language (English or Bilingual)             Cognitive  Cognitive/Neuro/Behavioral: WDL                      Living Environment:   People in home: spouse     Current living Arrangements: house (5 levels)      Able to return to prior arrangements: yes       Family/Social Support:  Care provided by:    Provides care for:    Marital Status:   , Children          Description of Support System: Supportive    Support Assessment: Adequate family and caregiver support, Patient communicates needs well met    Current Resources:   Patient receiving home care services:       Community Resources:    Equipment currently used at home: cane, straight  Supplies currently used at home:      Employment/Financial:  Employment Status:          Financial Concerns:             Lifestyle & Psychosocial Needs:  Social Determinants of Health     Tobacco Use: Not on file   Alcohol Use: Not on file   Financial Resource Strain: Not on file   Food Insecurity: Not on file   Transportation Needs: Not on file   Physical Activity: Not on file   Stress: Not on file   Social Connections: Not on file   Intimate Partner Violence: Not on file   Depression: Not on file   Housing Stability: Not on file       Functional Status:  Prior to admission patient needed assistance:              Mental Health Status:  Mental Health Status: No Current Concerns       Chemical Dependency Status:  Chemical Dependency Status: No Current Concerns               Additional Information:  SW spoke with patient via phone due to COVID. Patient states  she lives at home with her . Patient has other family members in the area that are supportive of her as well. Patient states her  can provide her with all the support she needs.     Patient states she does not have a ride home and is unsure if she has transportation benefits. SW will call [employer] at [phone] at discharge. If she does not have ride benefits, patient is aware that she would be responsible for her own transportation costs.     Addendum 144: Patient is being recommended for home PT. CARMENCITA lvm with patient requesting a call back.     Addendum 165: CARMENCITA called [facility] [employer] as patient is discharging. Patient does not have ride benefits. Will give RN a transport list and patient will have to call cab on her own to pay for it.     Addendum 1634: CARMENCITA wrote down cost for transportation and gave to the nurse and also provided list of taxi services. Reviewed out of pocket cost for [facility] transport, $81.80 for base rate and $5.26 per mile to the destination. Spoke with RN who verified patient is agreeable.  CARMENCITA scheduled a ride via stretcher and updated patient that due to COVID + status, she would have to take a stretcher and it would be over $1000. Patient then stated her  was coming to get her. CARMENCITA canceled ride.     DARON Rodriguez, [facility]  Emergency Room   [phone]-Please contact the CARMENCITA on the floor in which the patient is staying for any questions or concerns

## 2022-12-05 NOTE — PLAN OF CARE
"PRIMARY DIAGNOSIS: ACUTE BACK PAIN  OUTPATIENT/OBSERVATION GOALS TO BE MET BEFORE DISCHARGE:  1. Pain Status: Improved-controlled with oral pain medications.    2. Return to near baseline physical activity: No    3. Cleared for discharge by consultants (if involved): No    Discharge Planner Nurse   Safe discharge environment identified: No  Barriers to discharge: Yes       Entered by: Shana Eller 12/05/2022 2:15 PM      Please review provider order for any additional goals.   Nurse to notify provider when observation goals have been met and patient is ready for discharge.    /80 (BP Location: Right arm)   Pulse 89   Temp 98.7  F (37.1  C) (Oral)   Resp 20   Ht 1.676 m (5' 6\")   Wt 59 kg (130 lb)   SpO2 99%   BMI 20.98 kg/m      Special precautions maintained, AOx4, A1 w gait belt, PRN oxy and tylenol given for back pain. Pt has obtained back brace orthotic. PT states that she can go back home with 24hr supervision which  can provide. Possible discharge today.  "

## 2022-12-05 NOTE — H&P
H&P dictated    Admit for LBP/compression fx after recent mechanical fall    +UA/UTI - Rx with Rocephin    +COVID - no treatment needed    obs admit

## 2022-12-05 NOTE — PLAN OF CARE
PRIMARY DIAGNOSIS: ACUTE BACK PAIN  OUTPATIENT/OBSERVATION GOALS TO BE MET BEFORE DISCHARGE:  1. Pain Status: Improved-controlled with oral pain medications.    2. Return to near baseline physical activity: No    3. Cleared for discharge by consultants (if involved): No    Discharge Planner Nurse   Safe discharge environment identified: Yes  Barriers to discharge: Yes       Entered by: Marialuisa Cuevas RN 12/05/2022 5:35 AM     Please review provider order for any additional goals.   Nurse to notify provider when observation goals have been met and patient is ready for discharge.      Vitals are Temp: 98.2  F (36.8  C) Temp src: Axillary BP: (!) 139/93 Pulse: 95   Resp: 18 SpO2: 100 %.    Patient is Alert and Oriented x4. They are 1 Assist with Gait Belt. Pt is on special precaution. Pt is a Regular diet.  They are complaining of 1/10 pain in their back.  Tylenol and Oxycodone given for pain.  Medicatons decreased pain.  Patient has Normal Saline 0.9% running at 100 mL per hour.

## 2022-12-05 NOTE — PROGRESS NOTES
[facility]  Neurosurgery Daily Progress Note    Assessment & Plan   72F presented with back pain s/p fall. Imaging shows an acute T12 compression fracture with 40% height loss and 4 mm retropulsion resulting in mild spinal canal stenosis, as well as chronic T7, T8, L1 vertebral body fractures. Today, patient reports continued back pain that mostly occurs with activity/movement. Denies radicular pain, numbness, or weakness. She does have tenderness to palpation of lower thoracic/upper lumbar spine. Upright XR was completed:     EXAM: XR LUMBAR SPINE 2/3 VIEWS  LOCATION: [facility]  DATE/TIME: 12/4/2022 4:57 PM  FINDINGS: Straightening of usual spinal curvature without significant spondylolisthesis. Redemonstrated acute T12 compression fracture (40% height loss) with 4 mm retropulsion and chronic fracture deformity of the L1 vertebral body. Scattered multilevel   degenerative change, most prominent at L5-S1 where there is mild disc height loss. Vascular calcification. Pelvic phleboliths.    Plan:  - No surgical intervention planned at this time  - Orthotics referral for TLSO brace   - Recommend to wear brace when out of bed. Brace will most likely be required for 3 months.  - Continue supportive and symptomatic treatment  - Continue pain control measures as needed    - Avoid heavy lifting, bending, twisting    - Follow up with NSG clinic in 6 weeks with xray prior    - Appreciate assistance from specialties      Discussed with Dr. Kathy Pang, CNP  [facility]  [address] [address]  Tel [phone]  Pager [phone]    Interval History   Stable      Physical Exam   Temp: 98.2  F (36.8  C) Temp src: Oral BP: (!) 141/94 Pulse: 90   Resp: 20 SpO2: 100 % O2 Device: None (Room air)    Vitals:    12/04/22 1917   Weight: 59 kg (130 lb)     Vital Signs with Ranges  Temp:  [97.9  F  (36.6  C)-98.2  F (36.8  C)] 98.2  F (36.8  C)  Pulse:  [] 90  Resp:  [16-20] 20  BP: (103-152)/() 141/94  SpO2:  [98 %-100 %] 100 %  No intake/output data recorded.    Mental status:  Alert and oriented x 3, speech is fluent.  Motor:  Moves all extremities, BLE 5/5   Sensation:  Intact to light touch in BLE. Tenderness to palpation of lower thoracic/upper lumbar spine.     Medications        cefTRIAXone  2 g Intravenous Q24H     folic acid  1 mg Oral Daily     potassium & sodium phosphates  1 packet Oral or Feeding Tube Q4H     senna-docusate  1 tablet Oral BID    Or     senna-docusate  2 tablet Oral BID     temazepam  7.5 mg Oral At Bedtime     thiamine  100 mg Oral Daily       Brenda Pang [facility] Neurosurgery   [address] [address]  Tel [phone]  Pager [phone]

## 2022-12-05 NOTE — PROGRESS NOTES
[facility]  OUTPATIENT PHYSICAL THERAPY EVALUATION  PLAN OF TREATMENT FOR OUTPATIENT REHABILITATION  (COMPLETE FOR INITIAL CLAIMS ONLY)  Patient's Last Name, First Name, M.I.  YOB: 1950  JeffRizwana  J                        Provider's Name  [facility] Medical Record No.  [id number]                             Onset Date:  12/04/22   Start of Care Date:  12/05/22   Type:     _X_PT   ___OT   ___SLP Medical Diagnosis:  T12 comp fx              PT Diagnosis:  Impaired functional mobility Visits from SOC:  1     See note for plan of treatment, functional goals and certification details    I CERTIFY THE NEED FOR THESE SERVICES FURNISHED UNDER        THIS PLAN OF TREATMENT AND WHILE UNDER MY CARE     (Physician co-signature of this document indicates review and certification of the therapy plan).                  12/05/22 1300   Appointment Info   Signing Clinician's Name / Credentials (PT) Corazon Villegas PT   Living Environment   People in Home spouse   Current Living Arrangements house   Home Accessibility stairs to enter home;stairs within home   Number of Stairs, Main Entrance 6   Stair Railings, Main Entrance railings on both sides of stairs   Number of Stairs, Within Home, Primary greater than 10 stairs   Stair Railings, Within Home, Primary railing on left side (ascending)   Transportation Anticipated car, drives self;family or friend will provide;other (see comments)   Living Environment Comments Pt lives in a 5 level home with her .  Pt usually enters home through the garage with a spiral staircase with 6 steps and B rails.  Pt states she can enter through the back door where there aren't any steps. Pt has 1 flight up to bedroom with L rail, though states she could sleep on the main level.  Guest bedroom and full bath on main level   Self-Care   Usual Activity Tolerance good   Current Activity Tolerance moderate   Regular Exercise Yes    Activity/Exercise Type walking  (and going to gym)   Exercise Amount/Frequency daily   Equipment Currently Used at Home cane, straight   Fall history within last six months yes   Number of times patient has fallen within last six months 1   Activity/Exercise/Self-Care Comment Pt amb without a device up until 3 days prior to admit when she started using a SEC.  Pt states she was using the cane due to having ulcers in her stomach and crystals in her ears.   General Information   Onset of Illness/Injury or Date of Surgery 12/04/22   Referring Physician Dr. Jean-Pierre Carnes   Patient/Family Therapy Goals Statement (PT) Pt agreeable to PT goals   Pertinent History of Current Problem (include personal factors and/or comorbidities that impact the POC) Per chart review, Mrs. Aguilar is a 72-year-old female with a history of alcohol dependence, alcohol withdrawal, alcohol pancreatitis, alcoholic liver disease, hypertension, paroxysmal atrial fibrillation, breast cancer, who presented to the hospital today for the above concerns.  The patient had a recent fall approximately 1-2 weeks ago.  She hurt her back.  She had mid to low back pain.  She had difficulty mobilizing the past week because of her symptoms.  She came to the ER today as her  was unable to continue to care for her at home given her ongoing difficulty with pain and mobility.  MRI shows T12 comp fx.   Existing Precautions/Restrictions brace worn when out of bed;spinal  (TLSO when OOB)   General Observations Pt lying in bed, sheet very wet under pt with pt not aware   Cognition   Orientation Status (Cognition) oriented to;person;place   Safety Deficit (Cognition) awareness of need for assistance;insight into deficits/self-awareness;minimal deficit;judgment   Cognitive Status Comments Pt states it's March 15th.   Pain Assessment   Patient Currently in Pain Yes, see Vital Sign flowsheet  (6/10 back pain with mobility)   Integumentary/Edema   Integumentary/Edema  no deficits were identifed   Posture    Posture Forward head position;Protracted shoulders   Range of Motion (ROM)   ROM Comment limited spine mobility due to comp fx and spinal prec   Strength (Manual Muscle Testing)   Strength Comments Generalized mms weakness and decreased core strength observed with mobility   Bed Mobility   Bed Mobility supine-sit   Supine-Sit [place] (Bed Mobility) verbal cues;minimum assist (75% patient effort)   Bed Mobility Limitations decreased ability to use arms for pushing/pulling;impaired ability to control trunk for mobility   Comment, (Bed Mobility) Sup > sit via R sidely from flat bed without rail with Min A at trunk.   Transfers   Transfers sit-stand transfer   Sit-Stand Transfer   Sit-Stand [place] (Transfers) verbal cues;contact guard   Assistive Device (Sit-Stand Transfers) walker, front-wheeled   Comment, (Sit-Stand Transfer) CGA   Gait/Stairs (Locomotion)   Solano Level (Gait) supervision;verbal cues   Assistive Device (Gait) walker, front-wheeled   Distance in Feet 10' (eval) + 10' (treatment)   Comment, (Gait/Stairs) Pt amb with FWW and SBA with slow gait and short shuffling steps   Balance   Balance Comments Impaired dynamic  standing balance requiring B UE support on FWW and SBA for safety   Sensory Examination   Sensory Perception patient reports no sensory changes   Coordination   Coordination no deficits were identified   Muscle Tone   Muscle Tone no deficits were identified   Clinical Impression   Criteria for Skilled Therapeutic Intervention Yes, treatment indicated   PT Diagnosis (PT) Impaired functional mobility   Influenced by the following impairments Decreased LE and core strength, impaired gait and balance, impaired cognition,   Functional limitations due to impairments Assisted mobility and ADLs, increased falls risk, unable to safely negotiate stairs at time of eval   Clinical Presentation (PT Evaluation Complexity) Stable/Uncomplicated    Clinical Presentation Rationale Medically stable, strong social support   Clinical Decision Making (Complexity) low complexity   Planned Therapy Interventions (PT) bed mobility training;gait training;orthotic fitting/training;patient/family education;strengthening;transfer training;progressive activity/exercise;risk factor education;home program guidelines   Anticipated Equipment Needs at Discharge (PT) walker, rolling  (pt may benefit from standard FWW (pt states she has a family member's FWW at home that is very large - ? bariatric)   Risk & Benefits of therapy have been explained evaluation/treatment results reviewed;care plan/treatment goals reviewed;risks/benefits reviewed;current/potential barriers reviewed;participants voiced agreement with care plan;participants included;patient   PT Total Evaluation Time   PT Eval, Low Complexity Minutes (69641) 10   Therapy Certification   Start of care date 12/05/22   Certification date from 12/05/22   Certification date to 12/06/22   Medical Diagnosis T12 comp fx   Physical Therapy Goals   PT Frequency Daily   PT Predicted Duration/Target Date for Goal Attainment 12/06/22   PT Goals Bed Mobility;Transfers;Gait;Stairs   PT: Bed Mobility Independent;Supine to/from sit;Rolling;Within precautions   PT: Transfers Modified independent;Sit to/from stand;Assistive device;Within precautions   PT: Gait Supervision/stand-by assist;Rolling walker;100 feet   PT: Stairs Minimal assist;Greater than 10 stairs;Rail on left   PT Discharge Planning   PT Plan Progress ind with all mobility and amb distance, stairs, gemma STEVEN, review spinal prec   PT Discharge Recommendation (DC Rec) home with assist;home with home care physical therapy   PT Rationale for DC Rec Pt appears to be below baseline with mobility due to back pain from comp fx and generalized mms weakness.  Pt currently requires Min A with bed mobility, SBA with transfers and amb 10' x 2 in room with FWW and close SBA.  Pt is  adament about returning home.   Recommend pt have 24/7 assist from  with all mobility with pt entering the home through the back door and sleeping  in the guest bedroom on  the main level to avoid doing stairs until her strength and balance improve.  Recommend family assist with ADLs and donning of brace, transportation, grocery shopping, meals, cleaning and laundry.  HHPT to increase functional strength and balance and progress safety and independence with all mobility to decrease falls risk.   PT Brief overview of current status Min A bed mobility, SBA amb 10' with FWW with brace on   Total Session Time   Total Session Time (sum of timed and untimed services) 10

## 2022-12-06 NOTE — PROGRESS NOTES
Physical Therapy Discharge Summary    Reason for therapy discharge:    Discharged to home with home therapy.    Progress towards therapy goal(s). See goals on Care Plan in [facility] electronic health record for goal details.  Goals not met.  Barriers to achieving goals:   discharge on same date as initial evaluation.    Therapy recommendation(s):    Continued therapy is recommended.  Rationale/Recommendations:  strengthening and maximizing IND with functional mobility.

## 2022-12-06 NOTE — PROGRESS NOTES
LATE NOTE    SW received call from patient's spouse Blas. Patient gave SW permission to speak to spouse. They are hoping for home care PT and HHA. They are familiar with home care. Blas reports patient was previously getting outpatient therapies but they will no longer being doing OP since patient cannot leave the house.     Spouse was in agreement for referrals to be sent. Referral sent to [facility] who deferred to other agencies.     DARON Rodriguez, [facility]  Emergency Room   [phone]-Please contact the SW on the floor in which the patient is staying for any questions or concerns

## 2022-12-07 NOTE — PROGRESS NOTES
[facility]   Due to a high volume of referrals [facility] has requested this patient's home care episode be transferred to their deferral partner [facility], [facility]. [phone].  Care team notified.

## 2024-04-27 ENCOUNTER — APPOINTMENT (OUTPATIENT)
Dept: GENERAL RADIOLOGY | Facility: CLINIC | Age: 74
DRG: 563 | End: 2024-04-27
Attending: EMERGENCY MEDICINE
Payer: COMMERCIAL

## 2024-04-27 ENCOUNTER — HOSPITAL ENCOUNTER (INPATIENT)
Facility: CLINIC | Age: 74
LOS: 4 days | Discharge: NURSING, REHAB OR LONG-TERM CARE | DRG: 563 | End: 2024-05-01
Attending: EMERGENCY MEDICINE | Admitting: STUDENT IN AN ORGANIZED HEALTH CARE EDUCATION/TRAINING PROGRAM
Payer: COMMERCIAL

## 2024-04-27 ENCOUNTER — APPOINTMENT (OUTPATIENT)
Dept: CT IMAGING | Facility: CLINIC | Age: 74
DRG: 563 | End: 2024-04-27
Attending: EMERGENCY MEDICINE
Payer: COMMERCIAL

## 2024-04-27 DIAGNOSIS — R11.2 NAUSEA AND VOMITING, UNSPECIFIED VOMITING TYPE: ICD-10-CM

## 2024-04-27 DIAGNOSIS — S42.035A CLOSED NONDISPLACED FRACTURE OF ACROMIAL END OF LEFT CLAVICLE, INITIAL ENCOUNTER: ICD-10-CM

## 2024-04-27 DIAGNOSIS — F10.90 ALCOHOL USE DISORDER: Primary | ICD-10-CM

## 2024-04-27 DIAGNOSIS — E87.29 ALCOHOLIC KETOACIDOSIS: ICD-10-CM

## 2024-04-27 LAB
ALBUMIN SERPL BCG-MCNC: 4.6 G/DL (ref 3.5–5.2)
ALBUMIN UR-MCNC: 100 MG/DL
ALP SERPL-CCNC: 106 U/L (ref 40–150)
ALT SERPL W P-5'-P-CCNC: 96 U/L (ref 0–50)
ANION GAP SERPL CALCULATED.3IONS-SCNC: 22 MMOL/L (ref 7–15)
ANION GAP SERPL CALCULATED.3IONS-SCNC: 31 MMOL/L (ref 7–15)
APPEARANCE UR: CLEAR
AST SERPL W P-5'-P-CCNC: 168 U/L (ref 0–45)
B-OH-BUTYR SERPL-SCNC: 8.8 MMOL/L
BACTERIA #/AREA URNS HPF: ABNORMAL /HPF
BASOPHILS # BLD AUTO: 0.1 10E3/UL (ref 0–0.2)
BASOPHILS NFR BLD AUTO: 1 %
BILIRUB SERPL-MCNC: 0.6 MG/DL
BILIRUB UR QL STRIP: NEGATIVE
BUN SERPL-MCNC: 5.6 MG/DL (ref 8–23)
BUN SERPL-MCNC: 5.7 MG/DL (ref 8–23)
CALCIUM SERPL-MCNC: 10.3 MG/DL (ref 8.8–10.2)
CALCIUM SERPL-MCNC: 9.2 MG/DL (ref 8.8–10.2)
CHLORIDE SERPL-SCNC: 94 MMOL/L (ref 98–107)
CHLORIDE SERPL-SCNC: 94 MMOL/L (ref 98–107)
COLOR UR AUTO: ABNORMAL
CREAT SERPL-MCNC: 0.59 MG/DL (ref 0.51–0.95)
CREAT SERPL-MCNC: 0.63 MG/DL (ref 0.51–0.95)
DEPRECATED HCO3 PLAS-SCNC: 15 MMOL/L (ref 22–29)
DEPRECATED HCO3 PLAS-SCNC: 16 MMOL/L (ref 22–29)
EGFRCR SERPLBLD CKD-EPI 2021: >90 ML/MIN/1.73M2
EGFRCR SERPLBLD CKD-EPI 2021: >90 ML/MIN/1.73M2
EOSINOPHIL # BLD AUTO: 0 10E3/UL (ref 0–0.7)
EOSINOPHIL NFR BLD AUTO: 0 %
ERYTHROCYTE [DISTWIDTH] IN BLOOD BY AUTOMATED COUNT: 12.2 % (ref 10–15)
ETHANOL SERPL-MCNC: <0.01 G/DL
GLUCOSE SERPL-MCNC: 111 MG/DL (ref 70–99)
GLUCOSE SERPL-MCNC: 116 MG/DL (ref 70–99)
GLUCOSE UR STRIP-MCNC: NEGATIVE MG/DL
HCO3 BLDV-SCNC: 13 MMOL/L (ref 21–28)
HCT VFR BLD AUTO: 43.1 % (ref 35–47)
HGB BLD-MCNC: 14.9 G/DL (ref 11.7–15.7)
HGB UR QL STRIP: ABNORMAL
HOLD SPECIMEN: NORMAL
HOLD SPECIMEN: NORMAL
HYALINE CASTS: 6 /LPF
IMM GRANULOCYTES # BLD: 0 10E3/UL
IMM GRANULOCYTES NFR BLD: 1 %
KETONES UR STRIP-MCNC: >150 MG/DL
LACTATE BLD-SCNC: 2.2 MMOL/L
LACTATE SERPL-SCNC: 1.3 MMOL/L (ref 0.7–2)
LEUKOCYTE ESTERASE UR QL STRIP: NEGATIVE
LIPASE SERPL-CCNC: 24 U/L (ref 13–60)
LYMPHOCYTES # BLD AUTO: 0.9 10E3/UL (ref 0.8–5.3)
LYMPHOCYTES NFR BLD AUTO: 13 %
MAGNESIUM SERPL-MCNC: 1.4 MG/DL (ref 1.7–2.3)
MAGNESIUM SERPL-MCNC: 1.5 MG/DL (ref 1.7–2.3)
MCH RBC QN AUTO: 36.1 PG (ref 26.5–33)
MCHC RBC AUTO-ENTMCNC: 34.6 G/DL (ref 31.5–36.5)
MCV RBC AUTO: 104 FL (ref 78–100)
MONOCYTES # BLD AUTO: 0.6 10E3/UL (ref 0–1.3)
MONOCYTES NFR BLD AUTO: 8 %
MUCOUS THREADS #/AREA URNS LPF: PRESENT /LPF
NEUTROPHILS # BLD AUTO: 5.8 10E3/UL (ref 1.6–8.3)
NEUTROPHILS NFR BLD AUTO: 77 %
NITRATE UR QL: NEGATIVE
NRBC # BLD AUTO: 0 10E3/UL
NRBC BLD AUTO-RTO: 0 /100
PCO2 BLDV: 23 MM HG (ref 40–50)
PH BLDV: 7.35 [PH] (ref 7.32–7.43)
PH UR STRIP: 5.5 [PH] (ref 5–7)
PHOSPHATE SERPL-MCNC: 3.5 MG/DL (ref 2.5–4.5)
PHOSPHATE SERPL-MCNC: 3.6 MG/DL (ref 2.5–4.5)
PLATELET # BLD AUTO: 193 10E3/UL (ref 150–450)
PO2 BLDV: 62 MM HG (ref 25–47)
POTASSIUM SERPL-SCNC: 4.2 MMOL/L (ref 3.4–5.3)
POTASSIUM SERPL-SCNC: 4.3 MMOL/L (ref 3.4–5.3)
PROT SERPL-MCNC: 8.1 G/DL (ref 6.4–8.3)
RBC # BLD AUTO: 4.13 10E6/UL (ref 3.8–5.2)
RBC URINE: 1 /HPF
SAO2 % BLDV: 91 % (ref 70–75)
SODIUM SERPL-SCNC: 132 MMOL/L (ref 135–145)
SODIUM SERPL-SCNC: 140 MMOL/L (ref 135–145)
SP GR UR STRIP: 1.02 (ref 1–1.03)
SQUAMOUS EPITHELIAL: 1 /HPF
TROPONIN T SERPL HS-MCNC: 17 NG/L
UROBILINOGEN UR STRIP-MCNC: NORMAL MG/DL
WBC # BLD AUTO: 7.5 10E3/UL (ref 4–11)
WBC URINE: <1 /HPF

## 2024-04-27 PROCEDURE — 36415 COLL VENOUS BLD VENIPUNCTURE: CPT | Performed by: EMERGENCY MEDICINE

## 2024-04-27 PROCEDURE — 81001 URINALYSIS AUTO W/SCOPE: CPT | Performed by: EMERGENCY MEDICINE

## 2024-04-27 PROCEDURE — 120N000001 HC R&B MED SURG/OB

## 2024-04-27 PROCEDURE — 83735 ASSAY OF MAGNESIUM: CPT | Performed by: EMERGENCY MEDICINE

## 2024-04-27 PROCEDURE — 258N000003 HC RX IP 258 OP 636: Performed by: EMERGENCY MEDICINE

## 2024-04-27 PROCEDURE — 250N000009 HC RX 250: Performed by: EMERGENCY MEDICINE

## 2024-04-27 PROCEDURE — 82803 BLOOD GASES ANY COMBINATION: CPT

## 2024-04-27 PROCEDURE — 96375 TX/PRO/DX INJ NEW DRUG ADDON: CPT

## 2024-04-27 PROCEDURE — 99291 CRITICAL CARE FIRST HOUR: CPT | Mod: 25

## 2024-04-27 PROCEDURE — 250N000013 HC RX MED GY IP 250 OP 250 PS 637: Performed by: STUDENT IN AN ORGANIZED HEALTH CARE EDUCATION/TRAINING PROGRAM

## 2024-04-27 PROCEDURE — 36556 INSERT NON-TUNNEL CV CATH: CPT

## 2024-04-27 PROCEDURE — 82010 KETONE BODYS QUAN: CPT | Performed by: EMERGENCY MEDICINE

## 2024-04-27 PROCEDURE — 250N000011 HC RX IP 250 OP 636: Performed by: EMERGENCY MEDICINE

## 2024-04-27 PROCEDURE — 84100 ASSAY OF PHOSPHORUS: CPT | Performed by: EMERGENCY MEDICINE

## 2024-04-27 PROCEDURE — 93005 ELECTROCARDIOGRAM TRACING: CPT

## 2024-04-27 PROCEDURE — 83735 ASSAY OF MAGNESIUM: CPT | Performed by: STUDENT IN AN ORGANIZED HEALTH CARE EDUCATION/TRAINING PROGRAM

## 2024-04-27 PROCEDURE — 83605 ASSAY OF LACTIC ACID: CPT

## 2024-04-27 PROCEDURE — 99223 1ST HOSP IP/OBS HIGH 75: CPT | Performed by: STUDENT IN AN ORGANIZED HEALTH CARE EDUCATION/TRAINING PROGRAM

## 2024-04-27 PROCEDURE — 96361 HYDRATE IV INFUSION ADD-ON: CPT

## 2024-04-27 PROCEDURE — 82077 ASSAY SPEC XCP UR&BREATH IA: CPT | Performed by: EMERGENCY MEDICINE

## 2024-04-27 PROCEDURE — 96365 THER/PROPH/DIAG IV INF INIT: CPT

## 2024-04-27 PROCEDURE — 84484 ASSAY OF TROPONIN QUANT: CPT | Performed by: EMERGENCY MEDICINE

## 2024-04-27 PROCEDURE — 250N000011 HC RX IP 250 OP 636: Performed by: STUDENT IN AN ORGANIZED HEALTH CARE EDUCATION/TRAINING PROGRAM

## 2024-04-27 PROCEDURE — 70450 CT HEAD/BRAIN W/O DYE: CPT

## 2024-04-27 PROCEDURE — 83690 ASSAY OF LIPASE: CPT | Performed by: EMERGENCY MEDICINE

## 2024-04-27 PROCEDURE — 258N000003 HC RX IP 258 OP 636: Performed by: STUDENT IN AN ORGANIZED HEALTH CARE EDUCATION/TRAINING PROGRAM

## 2024-04-27 PROCEDURE — 80053 COMPREHEN METABOLIC PANEL: CPT | Performed by: EMERGENCY MEDICINE

## 2024-04-27 PROCEDURE — 71046 X-RAY EXAM CHEST 2 VIEWS: CPT

## 2024-04-27 PROCEDURE — 73030 X-RAY EXAM OF SHOULDER: CPT | Mod: LT

## 2024-04-27 PROCEDURE — 36415 COLL VENOUS BLD VENIPUNCTURE: CPT

## 2024-04-27 PROCEDURE — 99292 CRITICAL CARE ADDL 30 MIN: CPT

## 2024-04-27 PROCEDURE — 85025 COMPLETE CBC W/AUTO DIFF WBC: CPT | Performed by: EMERGENCY MEDICINE

## 2024-04-27 PROCEDURE — 84100 ASSAY OF PHOSPHORUS: CPT | Performed by: STUDENT IN AN ORGANIZED HEALTH CARE EDUCATION/TRAINING PROGRAM

## 2024-04-27 PROCEDURE — HZ2ZZZZ DETOXIFICATION SERVICES FOR SUBSTANCE ABUSE TREATMENT: ICD-10-PCS | Performed by: STUDENT IN AN ORGANIZED HEALTH CARE EDUCATION/TRAINING PROGRAM

## 2024-04-27 RX ORDER — LORAZEPAM 0.5 MG/1
1-2 TABLET ORAL EVERY 30 MIN PRN
Status: DISCONTINUED | OUTPATIENT
Start: 2024-04-27 | End: 2024-05-01 | Stop reason: HOSPADM

## 2024-04-27 RX ORDER — LIDOCAINE 40 MG/G
CREAM TOPICAL
Status: DISCONTINUED | OUTPATIENT
Start: 2024-04-27 | End: 2024-05-01 | Stop reason: HOSPADM

## 2024-04-27 RX ORDER — FLUMAZENIL 0.1 MG/ML
0.2 INJECTION, SOLUTION INTRAVENOUS
Status: DISCONTINUED | OUTPATIENT
Start: 2024-04-27 | End: 2024-05-01 | Stop reason: HOSPADM

## 2024-04-27 RX ORDER — ONDANSETRON 4 MG/1
4 TABLET, ORALLY DISINTEGRATING ORAL EVERY 6 HOURS PRN
Status: DISCONTINUED | OUTPATIENT
Start: 2024-04-27 | End: 2024-05-01 | Stop reason: HOSPADM

## 2024-04-27 RX ORDER — HALOPERIDOL 5 MG/ML
2.5-5 INJECTION INTRAMUSCULAR EVERY 6 HOURS PRN
Status: DISCONTINUED | OUTPATIENT
Start: 2024-04-27 | End: 2024-05-01 | Stop reason: HOSPADM

## 2024-04-27 RX ORDER — GABAPENTIN 600 MG/1
1200 TABLET ORAL ONCE
Status: COMPLETED | OUTPATIENT
Start: 2024-04-27 | End: 2024-04-27

## 2024-04-27 RX ORDER — AMOXICILLIN 250 MG
2 CAPSULE ORAL 2 TIMES DAILY PRN
Status: DISCONTINUED | OUTPATIENT
Start: 2024-04-27 | End: 2024-05-01 | Stop reason: HOSPADM

## 2024-04-27 RX ORDER — GABAPENTIN 300 MG/1
300 CAPSULE ORAL EVERY 8 HOURS
Qty: 6 CAPSULE | Refills: 0 | Status: DISCONTINUED | OUTPATIENT
Start: 2024-05-03 | End: 2024-05-01 | Stop reason: HOSPADM

## 2024-04-27 RX ORDER — GABAPENTIN 100 MG/1
100 CAPSULE ORAL EVERY 8 HOURS
Qty: 9 CAPSULE | Refills: 0 | Status: DISCONTINUED | OUTPATIENT
Start: 2024-05-05 | End: 2024-05-01 | Stop reason: HOSPADM

## 2024-04-27 RX ORDER — MULTIPLE VITAMINS W/ MINERALS TAB 9MG-400MCG
1 TAB ORAL DAILY
Status: DISCONTINUED | OUTPATIENT
Start: 2024-04-28 | End: 2024-05-01 | Stop reason: HOSPADM

## 2024-04-27 RX ORDER — FOLIC ACID 1 MG/1
1 TABLET ORAL DAILY
Status: DISCONTINUED | OUTPATIENT
Start: 2024-04-28 | End: 2024-05-01 | Stop reason: HOSPADM

## 2024-04-27 RX ORDER — LORAZEPAM 2 MG/ML
1-2 INJECTION INTRAMUSCULAR EVERY 30 MIN PRN
Status: DISCONTINUED | OUTPATIENT
Start: 2024-04-27 | End: 2024-05-01 | Stop reason: HOSPADM

## 2024-04-27 RX ORDER — DIAZEPAM 10 MG/2ML
5-10 INJECTION, SOLUTION INTRAMUSCULAR; INTRAVENOUS EVERY 30 MIN PRN
Status: DISCONTINUED | OUTPATIENT
Start: 2024-04-27 | End: 2024-04-27

## 2024-04-27 RX ORDER — ACETAMINOPHEN 650 MG/1
650 SUPPOSITORY RECTAL EVERY 4 HOURS PRN
Status: DISCONTINUED | OUTPATIENT
Start: 2024-04-27 | End: 2024-05-01 | Stop reason: HOSPADM

## 2024-04-27 RX ORDER — GABAPENTIN 300 MG/1
600 CAPSULE ORAL EVERY 8 HOURS
Qty: 12 CAPSULE | Refills: 0 | Status: DISCONTINUED | OUTPATIENT
Start: 2024-05-01 | End: 2024-05-01 | Stop reason: HOSPADM

## 2024-04-27 RX ORDER — GABAPENTIN 300 MG/1
900 CAPSULE ORAL EVERY 8 HOURS
Qty: 27 CAPSULE | Refills: 0 | Status: COMPLETED | OUTPATIENT
Start: 2024-04-28 | End: 2024-04-30

## 2024-04-27 RX ORDER — MAGNESIUM SULFATE HEPTAHYDRATE 40 MG/ML
2 INJECTION, SOLUTION INTRAVENOUS ONCE
Status: COMPLETED | OUTPATIENT
Start: 2024-04-27 | End: 2024-04-27

## 2024-04-27 RX ORDER — METOPROLOL SUCCINATE 50 MG/1
50 TABLET, EXTENDED RELEASE ORAL DAILY
COMMUNITY

## 2024-04-27 RX ORDER — CALCIUM CARBONATE 500 MG/1
1000 TABLET, CHEWABLE ORAL 4 TIMES DAILY PRN
Status: DISCONTINUED | OUTPATIENT
Start: 2024-04-27 | End: 2024-05-01 | Stop reason: HOSPADM

## 2024-04-27 RX ORDER — MULTIVITAMIN WITH IRON
1 TABLET ORAL DAILY
Status: ON HOLD | COMMUNITY
End: 2024-05-01

## 2024-04-27 RX ORDER — ONDANSETRON 2 MG/ML
4 INJECTION INTRAMUSCULAR; INTRAVENOUS EVERY 6 HOURS PRN
Status: DISCONTINUED | OUTPATIENT
Start: 2024-04-27 | End: 2024-05-01 | Stop reason: HOSPADM

## 2024-04-27 RX ORDER — ACETAMINOPHEN 325 MG/1
650 TABLET ORAL EVERY 4 HOURS PRN
Status: DISCONTINUED | OUTPATIENT
Start: 2024-04-27 | End: 2024-05-01 | Stop reason: HOSPADM

## 2024-04-27 RX ORDER — MECLIZINE HYDROCHLORIDE 25 MG/1
25 TABLET ORAL DAILY PRN
Status: ON HOLD | COMMUNITY
End: 2024-05-01

## 2024-04-27 RX ORDER — PROCHLORPERAZINE MALEATE 5 MG
5 TABLET ORAL EVERY 6 HOURS PRN
Status: DISCONTINUED | OUTPATIENT
Start: 2024-04-27 | End: 2024-05-01 | Stop reason: HOSPADM

## 2024-04-27 RX ORDER — PROCHLORPERAZINE 25 MG
12.5 SUPPOSITORY, RECTAL RECTAL EVERY 12 HOURS PRN
Status: DISCONTINUED | OUTPATIENT
Start: 2024-04-27 | End: 2024-05-01 | Stop reason: HOSPADM

## 2024-04-27 RX ORDER — DEXTROSE, SODIUM CHLORIDE, SODIUM LACTATE, POTASSIUM CHLORIDE, AND CALCIUM CHLORIDE 5; .6; .31; .03; .02 G/100ML; G/100ML; G/100ML; G/100ML; G/100ML
INJECTION, SOLUTION INTRAVENOUS CONTINUOUS
Status: DISCONTINUED | OUTPATIENT
Start: 2024-04-27 | End: 2024-05-01 | Stop reason: HOSPADM

## 2024-04-27 RX ORDER — OLANZAPINE 5 MG/1
5-10 TABLET, ORALLY DISINTEGRATING ORAL EVERY 6 HOURS PRN
Status: DISCONTINUED | OUTPATIENT
Start: 2024-04-27 | End: 2024-05-01 | Stop reason: HOSPADM

## 2024-04-27 RX ORDER — DIAZEPAM 5 MG
10 TABLET ORAL EVERY 30 MIN PRN
Status: DISCONTINUED | OUTPATIENT
Start: 2024-04-27 | End: 2024-04-27

## 2024-04-27 RX ORDER — DIMENHYDRINATE 50 MG
50 TABLET ORAL PRN
Status: ON HOLD | COMMUNITY
End: 2024-05-01

## 2024-04-27 RX ORDER — DILTIAZEM HYDROCHLORIDE 120 MG/1
120 CAPSULE, COATED, EXTENDED RELEASE ORAL DAILY
Status: DISCONTINUED | OUTPATIENT
Start: 2024-04-27 | End: 2024-05-01 | Stop reason: HOSPADM

## 2024-04-27 RX ORDER — AMOXICILLIN 250 MG
1 CAPSULE ORAL 2 TIMES DAILY PRN
Status: DISCONTINUED | OUTPATIENT
Start: 2024-04-27 | End: 2024-05-01 | Stop reason: HOSPADM

## 2024-04-27 RX ORDER — METOPROLOL SUCCINATE 50 MG/1
50 TABLET, EXTENDED RELEASE ORAL DAILY
Status: DISCONTINUED | OUTPATIENT
Start: 2024-04-27 | End: 2024-05-01 | Stop reason: HOSPADM

## 2024-04-27 RX ORDER — ENOXAPARIN SODIUM 100 MG/ML
40 INJECTION SUBCUTANEOUS EVERY 24 HOURS
Status: DISCONTINUED | OUTPATIENT
Start: 2024-04-27 | End: 2024-05-01 | Stop reason: HOSPADM

## 2024-04-27 RX ORDER — ONDANSETRON 2 MG/ML
4 INJECTION INTRAMUSCULAR; INTRAVENOUS ONCE
Status: COMPLETED | OUTPATIENT
Start: 2024-04-27 | End: 2024-04-27

## 2024-04-27 RX ORDER — LISINOPRIL 10 MG/1
10 TABLET ORAL DAILY PRN
Status: ON HOLD | COMMUNITY
End: 2024-05-01

## 2024-04-27 RX ADMIN — SODIUM CHLORIDE 1000 ML: 9 INJECTION, SOLUTION INTRAVENOUS at 13:37

## 2024-04-27 RX ADMIN — ENOXAPARIN SODIUM 40 MG: 40 INJECTION SUBCUTANEOUS at 21:32

## 2024-04-27 RX ADMIN — DILTIAZEM HYDROCHLORIDE 120 MG: 120 CAPSULE, COATED, EXTENDED RELEASE ORAL at 20:37

## 2024-04-27 RX ADMIN — Medication 5 MG: at 21:32

## 2024-04-27 RX ADMIN — SODIUM CHLORIDE, SODIUM LACTATE, POTASSIUM CHLORIDE, CALCIUM CHLORIDE, AND DEXTROSE MONOHYDRATE: 600; 310; 30; 20; 5 INJECTION, SOLUTION INTRAVENOUS at 18:38

## 2024-04-27 RX ADMIN — FOLIC ACID: 5 INJECTION, SOLUTION INTRAMUSCULAR; INTRAVENOUS; SUBCUTANEOUS at 14:59

## 2024-04-27 RX ADMIN — SODIUM CHLORIDE, POTASSIUM CHLORIDE, SODIUM LACTATE AND CALCIUM CHLORIDE 1000 ML: 600; 310; 30; 20 INJECTION, SOLUTION INTRAVENOUS at 17:18

## 2024-04-27 RX ADMIN — METOPROLOL SUCCINATE 50 MG: 50 TABLET, EXTENDED RELEASE ORAL at 17:10

## 2024-04-27 RX ADMIN — MAGNESIUM SULFATE HEPTAHYDRATE 2 G: 2 INJECTION, SOLUTION INTRAVENOUS at 17:17

## 2024-04-27 RX ADMIN — GABAPENTIN 1200 MG: 600 TABLET, FILM COATED ORAL at 17:11

## 2024-04-27 RX ADMIN — ONDANSETRON 4 MG: 2 INJECTION INTRAMUSCULAR; INTRAVENOUS at 13:40

## 2024-04-27 ASSESSMENT — LIFESTYLE VARIABLES
HEADACHE, FULLNESS IN HEAD: NOT PRESENT
PAROXYSMAL SWEATS: NO SWEAT VISIBLE
AUDITORY DISTURBANCES: NOT PRESENT
VISUAL DISTURBANCES: NOT PRESENT
PAROXYSMAL SWEATS: NO SWEAT VISIBLE
ORIENTATION AND CLOUDING OF SENSORIUM: ORIENTED AND CAN DO SERIAL ADDITIONS
ORIENTATION AND CLOUDING OF SENSORIUM: ORIENTED AND CAN DO SERIAL ADDITIONS
TREMOR: 5
VISUAL DISTURBANCES: NOT PRESENT
AUDITORY DISTURBANCES: NOT PRESENT
ANXIETY: NO ANXIETY, AT EASE
TREMOR: 5
TOTAL SCORE: 5
NAUSEA AND VOMITING: NO NAUSEA AND NO VOMITING
NAUSEA AND VOMITING: NO NAUSEA AND NO VOMITING
AGITATION: NORMAL ACTIVITY
AGITATION: NORMAL ACTIVITY
HEADACHE, FULLNESS IN HEAD: NOT PRESENT
TOTAL SCORE: 6
ANXIETY: MILDLY ANXIOUS

## 2024-04-27 ASSESSMENT — ACTIVITIES OF DAILY LIVING (ADL)
ADLS_ACUITY_SCORE: 38
ADLS_ACUITY_SCORE: 38
ADLS_ACUITY_SCORE: 25
ADLS_ACUITY_SCORE: 38
ADLS_ACUITY_SCORE: 25
ADLS_ACUITY_SCORE: 38
ADLS_ACUITY_SCORE: 25

## 2024-04-27 ASSESSMENT — COLUMBIA-SUICIDE SEVERITY RATING SCALE - C-SSRS
1. IN THE PAST MONTH, HAVE YOU WISHED YOU WERE DEAD OR WISHED YOU COULD GO TO SLEEP AND NOT WAKE UP?: NO
6. HAVE YOU EVER DONE ANYTHING, STARTED TO DO ANYTHING, OR PREPARED TO DO ANYTHING TO END YOUR LIFE?: NO
2. HAVE YOU ACTUALLY HAD ANY THOUGHTS OF KILLING YOURSELF IN THE PAST MONTH?: NO

## 2024-04-27 NOTE — ED TRIAGE NOTES
"Pt arrives to the ED via EMS from home due to a fall that occurred last night. Pt states she tripped on the stair. Pt had EMS crew out to see her after the fall, denied transport at that time. Today started to have increased pain in left shoulder, bruising noted. Pt unsure if she hit her head during the fall. Not on thinners. Pt given 15 mg toradol by EMS. Pt admits to having 2 vodka galan a day. Denies h/o withdrawals. States that over the past couple days she has been vomiting every day. Not able to keep much down at home. Pt states \"they think it is due to tumors in my stomach from taking to much aleve\". Pt tremulous, states she does feel more tremulous than her normal.        "

## 2024-04-27 NOTE — ED NOTES
[facility]  ED Nurse Handoff Report    ED Chief complaint: Fall and Vomiting  . ED Diagnosis:   Final diagnoses:   Closed nondisplaced fracture of acromial end of left clavicle, initial encounter       Allergies:   Allergies   Allergen Reactions    Penicillins Unknown       Code Status: Full Code    Activity level - Baseline/Home:  independent.  Activity Level - Current:   assist of 1.   Lift room needed: No.   Bariatric: No   Needed: No   Isolation: No.   Infection: Not Applicable.     Respiratory status: Room air    Vital Signs (within 30 minutes):   Vitals:    04/27/24 1358 04/27/24 1413 04/27/24 1506 04/27/24 1521   BP: (!) 156/97  (!) 169/96    Pulse:  (!) 141  (!) 141   Resp:  16     Temp:       TempSrc:       SpO2:  99%  99%   Weight:       Height:           Cardiac Rhythm:  ,      Pain level:    Patient confused: yes- asks repetitive questions  Patient Falls Risk: nonskid shoes/slippers when out of bed, arm band in place, patient and family education, and activity supervised.   Elimination Status: Has voided     Patient Report - Initial Complaint: Fall, vomiting.   Focused Assessment:    Musculoskeletal  CT      MusculoskeletalMusculoskeletal WDL: .WDL exceptAdditional Documentation: LUE Neurovascular Assessment (Group)  LUE Neurovascular AssessmentTemperature LUE: warmColor LUE: other (see comments) (Pt has hematoma to left shoulder and collarbone area. Fall last night by tripping on the stairs. Denies LOC. Not on thinners.)Sensation LUE: no numbness; tenderness present; no tingling      1413  Gastrointestinal  CT    GastrointestinalGastrointestinal WDL: .WDL except; GI symptomsGI Signs/Symptoms: vomiting (Pt states for the past couple days she has been vomiting and not able to keep much down. Drinks daily vodka sodas. States last drink was yesterday.)Abdominal Palpation: All QuadrantsAll Quadrants Abdominal Palpation: soft; nontender          Abnormal Results:   Labs  Ordered and Resulted from Time of ED Arrival to Time of ED Departure   TROPONIN T, HIGH SENSITIVITY - Abnormal       Result Value    Troponin T, High Sensitivity 17 (*)    COMPREHENSIVE METABOLIC PANEL - Abnormal    Sodium 140      Potassium 4.2      Carbon Dioxide (CO2) 15 (*)     Anion Gap 31 (*)     Urea Nitrogen 5.7 (*)     Creatinine 0.63      GFR Estimate >90      Calcium 10.3 (*)     Chloride 94 (*)     Glucose 116 (*)     Alkaline Phosphatase 106       (*)     ALT 96 (*)     Protein Total 8.1      Albumin 4.6      Bilirubin Total 0.6     ROUTINE UA WITH MICROSCOPIC REFLEX TO CULTURE - Abnormal    Color Urine Light Brown (*)     Appearance Urine Clear      Glucose Urine Negative      Bilirubin Urine Negative      Ketones Urine >150 (*)     Specific Gravity Urine 1.018      Blood Urine Small (*)     pH Urine 5.5      Protein Albumin Urine 100 (*)     Urobilinogen Urine Normal      Nitrite Urine Negative      Leukocyte Esterase Urine Negative      Bacteria Urine Few (*)     Mucus Urine Present (*)     RBC Urine 1      WBC Urine <1      Squamous Epithelials Urine 1      Hyaline Casts Urine 6 (*)    CBC WITH PLATELETS AND DIFFERENTIAL - Abnormal    WBC Count 7.5      RBC Count 4.13      Hemoglobin 14.9      Hematocrit 43.1       (*)     MCH 36.1 (*)     MCHC 34.6      RDW 12.2      Platelet Count 193      % Neutrophils 77      % Lymphocytes 13      % Monocytes 8      % Eosinophils 0      % Basophils 1      % Immature Granulocytes 1      NRBCs per 100 WBC 0      Absolute Neutrophils 5.8      Absolute Lymphocytes 0.9      Absolute Monocytes 0.6      Absolute Eosinophils 0.0      Absolute Basophils 0.1      Absolute Immature Granulocytes 0.0      Absolute NRBCs 0.0     MAGNESIUM - Abnormal    Magnesium 1.4 (*)    KETONE BETA-HYDROXYBUTYRATE QUANTITATIVE, RAPID - Abnormal    Ketone (Beta-Hydroxybutyrate) Quantitative 8.80 (*)    ISTAT GASES LACTATE VENOUS POCT - Abnormal    Lactic Acid POCT 2.2 (*)      Bicarbonate Venous POCT 13 (*)     O2 Sat, Venous POCT 91 (*)     pCO2 Venous POCT 23 (*)     pH Venous POCT 7.35      pO2 Venous POCT 62 (*)    LIPASE - Normal    Lipase 24     ETHYL ALCOHOL LEVEL - Normal    Alcohol ethyl <0.01     PHOSPHORUS - Normal    Phosphorus 3.5     ISTAT GASES LACTATE VENOUS POCT        XR Shoulder Left G/E 3 Views   Final Result   IMPRESSION: Comminuted and mildly displaced left clavicle fracture, involving the lateral one third, extending to the acromioclavicular joint. Fracture lines are longitudinally oriented, extending over 6 cm in length. Fracture displacement measures up to    1.1 cm. The acromial clavicular joint remains normally aligned, although the fracture does extend into the joint. The glenohumeral joint is normal. Bones appear demineralized.      Chest XR,  PA & LAT   Final Result   IMPRESSION: No acute cardiopulmonary abnormality. Comminuted fracture of the left distal clavicle, as seen on dedicated left shoulder radiographs. Left atrial appendage occlusion device.      Head CT w/o contrast   Final Result   IMPRESSION:   1.  No acute intracranial abnormality.          Treatments provided: NS bolus, Banana bag, zofran  Family Comments:  present earlier and aware of plan of care  OBS brochure/video discussed/provided to patient:  N/A  ED Medications:   Medications   diazepam (VALIUM) tablet 10 mg (has no administration in time range)     Or   diazepam (VALIUM) injection 5-10 mg (has no administration in time range)   sodium chloride 0.9% BOLUS 1,000 mL (1,000 mLs Intravenous $New Bag 4/27/24 1337)   ondansetron (ZOFRAN) injection 4 mg (4 mg Intravenous $Given 4/27/24 1340)   sodium chloride 0.9 % 1,000 mL with Infuvite Adult 10 mL, thiamine 100 mg, folic acid 1 mg infusion ( Intravenous $New Bag 4/27/24 3532)       Drips infusing:  Yes  For the majority of the shift this patient was Green.   Interventions performed were n/a.    Sepsis treatment initiated:  No    Cares/treatment/interventions/medications to be completed following ED care: Monitor CIWA score, daily drinker    ED Nurse Name: Maria R Chirinos RN  3:43 PM   RECEIVING UNIT ED HANDOFF REVIEW    Above ED Nurse Handoff Report was reviewed: Yes  Reviewed by: Della Guillen, RN on April 27, 2024 at 7:35 PM   [facility] Bowman called the ED to inform them the note was read: Yes

## 2024-04-27 NOTE — LETTER
[facility]  [address] [facility] [address]  Phone: [phone]  Fax: [phone]    May 1, 2024        Rizwana Aguilar  [address] [facility] [address]          To whom it may concern:    RE: Rizwana Aguilar    {:[id number]}    Please contact me for questions or concerns.      Sincerely,      Luke Yen, DO

## 2024-04-27 NOTE — ED NOTES
Blas updated on plan for admission. Correct number in the chart for . Would like to be called for updates.

## 2024-04-27 NOTE — PHARMACY-ADMISSION MEDICATION HISTORY
Pharmacist Admission Medication History    Admission medication history is complete. The information provided in this note is only as accurate as the sources available at the time of the update.    Information Source(s): Family member and CareEverywhere/SureScripts via phone    Pertinent Information:  Pt stopped taking her meds 2-3 days ago.    Changes made to PTA medication list:  Added: Melatonin, Meclizine, Dramamine & B Complex.  Deleted: Tylenol, Ca, Plavix, Folic acid  Changed:         -Lisinopril 20mg daily-->10mg daily prn        -Omeprazole 40mg daily-->20mg bid    Allergies reviewed with patient and updates made in EHR: yes    Medication History Completed By: Nicky Harmon PharmD 4/27/2024 6:50 PM    PTA Med List   Medication Sig Last Dose    aspirin 81 MG EC tablet Take 81 mg by mouth daily 4/24/2024    diltiazem ER (DILT-XR) 120 MG 24 hr capsule Take 120 mg by mouth daily 4/24/2024    dimenhyDRINATE (DRAMAMINE) 50 MG tablet Take 50 mg by mouth as needed for other prn    lisinopril (ZESTRIL) 10 MG tablet Take 10 mg by mouth daily as needed For SBP>140 prn    meclizine (ANTIVERT) 25 MG tablet Take 25 mg by mouth daily as needed for dizziness prn    melatonin 5 MG tablet Take 5 mg by mouth nightly as needed for sleep prn    metoprolol succinate ER (TOPROL XL) 50 MG 24 hr tablet Take 50 mg by mouth daily 4/24/2024    omeprazole (PRILOSEC) 20 MG DR capsule Take 20 mg by mouth 2 times daily 4/24/2024    vitamin C with B complex (B COMPLEX-C) tablet Take 1 tablet by mouth daily 4/24/2024

## 2024-04-27 NOTE — ED PROVIDER NOTES
History     Chief Complaint:  Fall and Vomiting     HPI   Rizwana Aguilar is a 73 year old female with history of hypertension, alcohol abuse with withdrawal, dementia, and atrial fibrillation on Plavix who presents to the ED via EMS from home for evaluation of vomiting and injury from fall. The patient reports she was walking up her stairs at home yesterday at 2230 when she fell forward, striking her left shoulder on the wooden step. She is unsure if she hit her head but denies loss of consciousness. EMS was called but the patient declined transport at that time. Since her fall she has had increased pain to her left shoulder, worse with movement, currently rated 6/10. She states she has had nausea and vomiting for the last three weeks, and has not been able to keep water down. The patient notes occasional urinary incontinence but denies head pain, neck pain, vision changes, numbness, weakness, chest pain, shortness of breath, constipation, bloody or black stools. She has history of vertigo, on Meclozine, and ambulates with a cane at baseline. The patient reports tremors in hands are baseline. She endorses two mixed drinks and some wine daily. Her last drink was last night.    Independent Historian:   EMS - They report additional patient history  EMS reports the patient's  reported to them, independently that the patient has history of alcohol abuse and has several falls a month. He also notes the patient was diagnosed with several tumors to her abdomen, but is in denial. The  confirms she has not been able to keep any fluids down.    Review of External Notes:   I reviewed the patient's ED note from 5/7/21 where she presented for vomiting.     Medications:    Aspirin 81 mg  Zofran  Zestril  Antivert  Neurontin  Cardizem  Toprol  Prilosec    Past Medical History:    Hypertension  Hypercholesterolemia  Dizziness  Dermatitis  Erosive gastropathy  Cognitive impairment  Paroxysmal atrial  "fibrillation  Generalized weakness  Major depressive disorder  Alcohol dependence with withdrawal seizures  Physical deconditioning  Alcoholic cirrhosis of liver without ascites  Vitamin D deficiency  Esophageal reflux  IBS  Breast cancer  Wedge compression fracture of t11-T12 vertebra     Past Surgical History:    Breast lumpectomy  Cataract removal   Watchman left atrial appendage closure device     Physical Exam   Patient Vitals for the past 24 hrs:   BP Temp Temp src Pulse Resp SpO2 Height Weight   04/27/24 1553 -- -- -- (!) 137 -- -- -- --   04/27/24 1538 -- -- -- -- 24 98 % -- --   04/27/24 1523 (!) 177/111 -- -- -- -- -- -- --   04/27/24 1521 -- -- -- (!) 141 -- 99 % -- --   04/27/24 1506 (!) 169/96 -- -- -- -- -- -- --   04/27/24 1413 -- -- -- (!) 141 16 99 % -- --   04/27/24 1358 (!) 156/97 -- -- -- -- -- -- --   04/27/24 1343 (!) 173/93 -- -- -- -- -- -- --   04/27/24 1328 -- 98.2  F (36.8  C) Oral -- -- -- -- --   04/27/24 1327 -- -- -- -- -- -- 1.676 m (5' 6\") 63.5 kg (140 lb)   04/27/24 1324 (!) 176/107 -- -- (!) 149 22 99 % -- --      Physical Exam  General: No acute distress  Head: No obvious trauma to head.  Ears, Nose, Throat:  External ears normal.  Nose normal.  No pharyngeal erythema, swelling or exudate.  Midline uvula. Moist mucus membranes.  Eyes:  Conjunctivae clear.   Neck: Normal range of motion.  Neck supple. No midline tenderness to palpation.  CV: Regular rate and rhythm.  No murmurs. Radial pulses 2+ bilaterally.  Respiratory: Effort normal and breath sounds normal.  No wheezing or crackles.   Gastrointestinal: Soft.  No distension. There is no tenderness.  There is no rigidity, no rebound and no guarding.   Musculoskeletal: Normal range of motion.  Non tender extremities to palpations. No lower extremity edema. Swelling over the left clavicle with tenderness to palpation. No gross deformities over left shoulder but diffusely tender to palpation.   Neuro: Alert. Moving all extremities " appropriately.  Normal speech.  CN II-XII grossly intact, no pronator drift, normal finger-nose-finger, visual fields intact. Intension tremor in bilateral upper extremities. Gross muscle strength intact of the proximal and distal bilateral upper and lower extremities.  Sensation intact to light touch in all 4 extremities.   Skin: Skin is warm and dry.  No rash noted. Ecchymosis over the left clavicle and anterior, lateral, posterior left shoulder.  Psych: Normal mood and affect. Behavior is normal.     Emergency Department Course   ECG  ECG taken at 1329, ECG read at 1339  Sinus tachycardia with premature supraventricular complexes  Possible inferior infarct  Marked ST abnormality  Abnormal ECG  No change as compared to prior, dated 5/7/21.  Rate 139 bpm. SC interval 126 ms. QRS duration 58 ms. QT/QTc 282/429 ms. P-R-T axes 69/ -1/ 114.     Imaging:  XR Shoulder Left G/E 3 Views   Final Result   IMPRESSION: Comminuted and mildly displaced left clavicle fracture, involving the lateral one third, extending to the acromioclavicular joint. Fracture lines are longitudinally oriented, extending over 6 cm in length. Fracture displacement measures up to    1.1 cm. The acromial clavicular joint remains normally aligned, although the fracture does extend into the joint. The glenohumeral joint is normal. Bones appear demineralized.      Chest XR,  PA & LAT   Final Result   IMPRESSION: No acute cardiopulmonary abnormality. Comminuted fracture of the left distal clavicle, as seen on dedicated left shoulder radiographs. Left atrial appendage occlusion device.      Head CT w/o contrast   Final Result   IMPRESSION:   1.  No acute intracranial abnormality.         Laboratory:  Labs Ordered and Resulted from Time of ED Arrival to Time of ED Departure   TROPONIN T, HIGH SENSITIVITY - Abnormal       Result Value    Troponin T, High Sensitivity 17 (*)    COMPREHENSIVE METABOLIC PANEL - Abnormal    Sodium 140      Potassium 4.2       Carbon Dioxide (CO2) 15 (*)     Anion Gap 31 (*)     Urea Nitrogen 5.7 (*)     Creatinine 0.63      GFR Estimate >90      Calcium 10.3 (*)     Chloride 94 (*)     Glucose 116 (*)     Alkaline Phosphatase 106       (*)     ALT 96 (*)     Protein Total 8.1      Albumin 4.6      Bilirubin Total 0.6     ROUTINE UA WITH MICROSCOPIC REFLEX TO CULTURE - Abnormal    Color Urine Light Brown (*)     Appearance Urine Clear      Glucose Urine Negative      Bilirubin Urine Negative      Ketones Urine >150 (*)     Specific Gravity Urine 1.018      Blood Urine Small (*)     pH Urine 5.5      Protein Albumin Urine 100 (*)     Urobilinogen Urine Normal      Nitrite Urine Negative      Leukocyte Esterase Urine Negative      Bacteria Urine Few (*)     Mucus Urine Present (*)     RBC Urine 1      WBC Urine <1      Squamous Epithelials Urine 1      Hyaline Casts Urine 6 (*)    CBC WITH PLATELETS AND DIFFERENTIAL - Abnormal    WBC Count 7.5      RBC Count 4.13      Hemoglobin 14.9      Hematocrit 43.1       (*)     MCH 36.1 (*)     MCHC 34.6      RDW 12.2      Platelet Count 193      % Neutrophils 77      % Lymphocytes 13      % Monocytes 8      % Eosinophils 0      % Basophils 1      % Immature Granulocytes 1      NRBCs per 100 WBC 0      Absolute Neutrophils 5.8      Absolute Lymphocytes 0.9      Absolute Monocytes 0.6      Absolute Eosinophils 0.0      Absolute Basophils 0.1      Absolute Immature Granulocytes 0.0      Absolute NRBCs 0.0     MAGNESIUM - Abnormal    Magnesium 1.4 (*)    KETONE BETA-HYDROXYBUTYRATE QUANTITATIVE, RAPID - Abnormal    Ketone (Beta-Hydroxybutyrate) Quantitative 8.80 (*)    ISTAT GASES LACTATE VENOUS POCT - Abnormal    Lactic Acid POCT 2.2 (*)     Bicarbonate Venous POCT 13 (*)     O2 Sat, Venous POCT 91 (*)     pCO2 Venous POCT 23 (*)     pH Venous POCT 7.35      pO2 Venous POCT 62 (*)    LIPASE - Normal    Lipase 24     ETHYL ALCOHOL LEVEL - Normal    Alcohol ethyl <0.01     PHOSPHORUS -  Normal    Phosphorus 3.5     ISTAT GASES LACTATE VENOUS POCT   MAGNESIUM   PHOSPHORUS      Emergency Department Course & Assessments:    Interventions:  Medications   lactated ringers BOLUS 1,000 mL (has no administration in time range)   dextrose 5% in lactated ringers infusion (has no administration in time range)   metoprolol succinate ER (TOPROL XL) 24 hr tablet 50 mg (has no administration in time range)   OLANZapine zydis (zyPREXA) ODT tab 5-10 mg (has no administration in time range)     Or   haloperidol lactate (HALDOL) injection 2.5-5 mg (has no administration in time range)   flumazenil (ROMAZICON) injection 0.2 mg (has no administration in time range)   melatonin tablet 5 mg (has no administration in time range)   gabapentin (NEURONTIN) tablet 1,200 mg (has no administration in time range)   gabapentin (NEURONTIN) capsule 900 mg (has no administration in time range)   gabapentin (NEURONTIN) capsule 600 mg (has no administration in time range)   gabapentin (NEURONTIN) capsule 300 mg (has no administration in time range)   gabapentin (NEURONTIN) capsule 100 mg (has no administration in time range)   LORazepam (ATIVAN) tablet 1-2 mg (has no administration in time range)     Or   LORazepam (ATIVAN) injection 1-2 mg (has no administration in time range)   thiamine (B-1) tablet 100 mg (has no administration in time range)   folic acid (FOLVITE) tablet 1 mg (has no administration in time range)   multivitamin w/minerals (THERA-VIT-M) tablet 1 tablet (has no administration in time range)   magnesium sulfate 2 g in 50 mL sterile water intermittent infusion (has no administration in time range)   sodium chloride 0.9% BOLUS 1,000 mL (0 mLs Intravenous Stopped 4/27/24 1553)   ondansetron (ZOFRAN) injection 4 mg (4 mg Intravenous $Given 4/27/24 1340)   sodium chloride 0.9 % 1,000 mL with Infuvite Adult 10 mL, thiamine 100 mg, folic acid 1 mg infusion ( Intravenous $New Bag 4/27/24 6033)      Assessments:  1320 Initial  Examination  1536 Rechecked patient and discussed results    Independent Interpretation (X-rays, CTs, rhythm strip):  Head CT negative for acute intracranial hemorrhage  Chest x-ray negative for consolidation, pleural effusion, pneumothorax  Shoulder x-ray reveals a left lateral clavicle fracture    Consultations/Discussion of Management or Tests:  1557 Discussed patient with Dr. Yen, Hospitalist        Social Determinants of Health affecting care:   None    Disposition:  The patient was admitted to the hospital under the care of Dr. Yen.     Impression & Plan    CMS Diagnoses: The Lactic acid level is elevated due to dehydration, at this time there is no sign of severe sepsis or septic shock.     Medical Decision Making:  Rizwana Aguilar is a 73 year old female presenting with nausea, vomiting and left shoulder pain after a fall.  She is tachycardic upon arrival and appears dehydrated.  She is given IV fluids, which improves her tachycardia.  Initial lactate is mildly elevated, returns to normal limits with IV fluids.  Beta-hydroxybutyrate is elevated.  She does have an anion gap.  This is likely from either chronic alcohol use or malnourishment.  She has no signs of infection and she has no leukocytosis.  Alcohol level is negative.  The patient's  is very concerned that the patient is in denial about the amount that she is drinking.  She is placed on the CIWA protocol.  Head CT is negative for acute intracranial hemorrhage.  X-ray does reveal a left clavicle fracture.  She is placed in a sling.  I discussed the patient with the hospitalist, agrees to admit the patient to their service.  She remains in stable condition and is transferred to the floor.    Diagnosis:    ICD-10-CM    1. Closed nondisplaced fracture of acromial end of left clavicle, initial encounter  S42.035A Neck/Shoulder/Back/Abdomen Bracing Supplies Order Sling; Left      2. Alcoholic ketoacidosis  E87.29       3. Nausea and vomiting,  unspecified vomiting type  R11.2            Discharge Medications:  New Prescriptions    No medications on file      Scribe Disclosure:  I, Galen Auguste, am serving as a scribe at 1:48 PM on 4/27/2024 to document services personally performed by Armen Simmons MD based on my observations and the provider's statements to me.     4/27/2024   Armen Simmons MD Peery, Stephen, MD  04/27/24 7912

## 2024-04-27 NOTE — ED NOTES
Bed: ED35  Expected date: 4/27/24  Expected time: 1:16 PM  Means of arrival:   Comments:  BV1    73F fall

## 2024-04-27 NOTE — ED NOTES
Pt reattached to monitoring equipment upon return from imaging. Pt updated on plan of care. Purewick placed due to pt stating that she is incontinent.

## 2024-04-27 NOTE — H&P
[facility]    History and Physical - Hospitalist Service       Date of Admission:  4/27/2024    Assessment & Plan      Rizwana Aguilar is a 73 year old female with PMH significant for paroxysymal afib not on anticoagulation, HTN, alcohol use disorder, alcoholic liver disease, pancreatitis, breast cancer, memory loss, erosive gastropathy admitted on 4/27/2024 after fall with left shoulder pain.     AGMA  Mild lactic acidosis  Alcoholic vs starvation ketosis:  Presents after fall.  Tripped over stair.  Patient endorses nausea and vomiting.  Has not been able to keep even water down.  This has persisted for multiple days.  In addition, patient has been drinking vodka.  She reports 2 drinks daily but this is likely severely under estimated.  AG 31, HCO3 15.  Ketones >8.  Lactic acid 2.2.  Most likely combination alcoholic and starvation ketosis.   -Received 1L NS and 1L banana bag in ED  -Will give additional 1L LR  -This will be followed by d5LR at 100 ml/hr  -ADAT starting with clears  -Repeat BMP tonight and again in AM    Tachycardia, unspecified  History of atrial fibrillation s/p watchman device:  HR 150s on arrival.  Improved to 130s after 2L IV fluids.  This is likely multifactorial in the setting of dehydration, poor oral intake, electrolyte disturbance, acidosis.  In addition, the patient has a history of paroxysmal atrial fibrillation and has not taken her rate control medications for the past couple of days.  EKG on arrival appears consistent with sinus tachycardia, although rate is fast and so it is difficulty to say for sure.  Consideration for afib RVR vs aflutter.  Blood pressure stable.   -Start PTA metoprolol, diltiazem  -Aggressive IVF resuscitation as above  -K goal >4.  Mag goal >2.  -Treat alcohol withdrawal, ketoacidosis  -Anticipate downtrend with the above  -If fails to downtrend, will further consider diltiazem drip  -Cardiac monitoring    Mechanical fall complicated   displaced left clavicular fracture:  Pt reports tripping and falling on stairs.  Denies LOC or trauma.  Hurt her left shoulder.  XR imaging shows mildly displaced left clavical fracture.  Exam shows bruising around the area.   reports that she was at nursing home approximately 1 year ago.  She was doing quite well while they are with physical therapy.  She then discharged herself and returned home.  She since then has had significant decline per her .  -Continue sling started in the ER  -Will consult ortho  -Anticipate non-op management so will not keep NPO  -PT/OT eval    Transaminitis  Alcohol use disorder:  Patient reports 2 watered down vodka drinks daily.  Suspect she severely underplays her use.   reports a minimum of 4 drinks a night.  Generally 2 vodka drinks, 2 large pours of alcohol, and this may be followed by a glass of daniel before bed.  This has complicated family matters for 5 years now per .  ETOH negative on arrival.  , ALT 96.  Fairly consistent with alcohol related injury pattern.  Patient tremulous.  High risk of alcohol withdrawal  -CIWA protocol with ativan and gabapentin.   -Thiamine & folate  -Offer CD/SW when clinically improved  -Repeat CMP in AM    Hypertension:  Blood pressure 170s systolic on arrival.  Patient reports she has not been able to keep down her BP medications over the past couple of days.   -Restart PTA metoprolol  -Restart other blood pressure medications once med rec is complete - patient unable to tell me her meds    Hypomagnesemia: Likely related to poor PO intake.  Replacement protocol ordered.    Breast cancer: s/p lumpectomy: Follows with oncology at [facility]. Decided against oncotype testing and chemo and adjuvant hormonal therapy.    Memory loss: Per chart review this is thought related to alcohol use.  Complicates cares.  At risk of delirium.     Urinary incontinence:  Chronic problem for her.     Erosive gastropathy:  Pantoprazole in place of PTA omeprazole  Dizziness: I believe she is prescribed meclizine  MDD:          Diet: Regular Diet Adult    DVT Prophylaxis: Enoxaparin (Lovenox) SQ  Billings Catheter: Not present  Lines: None     Cardiac Monitoring: None  Code Status:  FULL    Clinically Significant Risk Factors Present on Admission           # Hypercalcemia: Highest Ca = 10.3 mg/dL in last 2 days, will monitor as appropriate  # Hypomagnesemia: Lowest Mg = 1.4 mg/dL in last 2 days, will replace as needed  # Anion Gap Metabolic Acidosis: Highest Anion Gap = 31 mmol/L in last 2 days, will monitor and treat as appropriate    # Drug Induced Platelet Defect: home medication list includes an antiplatelet medication   # Hypertension: Home medication list includes antihypertensive(s)                 Disposition Plan     Medically Ready for Discharge: Anticipated in 2-4 Days      Luke Yen DO  Hospitalist Service  [facility]  Securely message with [employer] (more info)  Text page via [employer] Paging/Directory     ______________________________________________________________________    Chief Complaint   Fall, shoulder pain    History is obtained from the patient    History of Present Illness   Rizwana Aguilar is a 73 year old female with PMH significant for paroxysymal afib not on anticoagulation, HTN, alcohol use disorder, alcoholic liver disease, pancreatitis, breast cancer, memory loss, erosive gastropathy admitted on 4/27/2024 after fall with left shoulder pain.     History is obtained from the patient and the patient's .    The patient is a poor historian.  She denies most symptoms apart from left shoulder pain.  She admits to fall after tripping over stairs today.  Only area of pain is her left shoulder.  Denies any other recent illnesses, symptoms.   reports that she has not had good oral intake for 3 days.  Last meaningful meal was probably 4 days ago.   reports that she usually picks at  meals at baseline.     reports that she drinks at least 4 drinks per day.  This is minimum.  He also reports that last year, she was had a nursing home.  He reports that she was doing extremely well while there.  She then discharged herself and ever since it has been a decline.      Past Medical History    Past Medical History:   Diagnosis Date    Hypertension        Past Surgical History   Past Surgical History:   Procedure Laterality Date    GYN SURGERY         Prior to Admission Medications   Prior to Admission Medications   Prescriptions Last Dose Informant Patient Reported? Taking?   acetaminophen (TYLENOL) 325 MG tablet   Yes No   Sig: Take 325-650 mg by mouth every 8 hours as needed for mild pain   aspirin 81 MG EC tablet   Yes No   Sig: Take 81 mg by mouth daily   calcium carbonate (OS-KHUSHI) 500 MG tablet   Yes No   Sig: Take 1 tablet by mouth daily   clopidogrel (PLAVIX) 75 MG tablet   Yes No   Sig: Take 75 mg by mouth daily   diltiazem ER (DILT-XR) 120 MG 24 hr capsule   Yes No   Sig: Take 120 mg by mouth daily   folic acid (FOLVITE) 1 MG tablet   No No   Sig: Take 1 tablet (1 mg) by mouth daily   lisinopril (ZESTRIL) 20 MG tablet   Yes No   Sig: Take 20 mg by mouth daily   metoprolol succinate ER (TOPROL XL) 50 MG 24 hr tablet   Yes No   Sig: Take 50 mg by mouth daily   omeprazole (PRILOSEC) 20 MG DR capsule   Yes No   Sig: Take 40 mg by mouth daily before breakfast      Facility-Administered Medications: None        Review of Systems    The 10 point Review of Systems is negative other than noted in the HPI or here.     Social History   I have reviewed this patient's social history and updated it with pertinent information if needed.  Social History     Tobacco Use    Smoking status: Never    Smokeless tobacco: Never         Family History   High blood pressure, cataract, breast cancer, rectal cancer      Allergies   Allergies   Allergen Reactions    Penicillins Unknown        Physical Exam   Vital  Signs: Temp: 98.2  F (36.8  C) Temp src: Oral BP: (!) 177/111 Pulse: (!) 137   Resp: 24 SpO2: 98 % O2 Device: None (Room air)    Weight: 140 lbs 0 oz    General Appearance: Patient awake & alert.  Poor historian.  No apparent distress.   Respiratory: Lungs are CTAB.  Work of breathing appears normal on room air.  Cardiovascular: Tachycardia.  No murmurs rubs or gallops.  There is no edema present.  GI: Benign.  Soft.  Non-tender.  Bowel sounds active.   Skin: No rashes or lesions exposed skin.  Mildly flushed.   Neuro:  The patient is moving all extremities.  No obvious focal asymmetries.   Other: Patient is appropriate.  Left shoulder is in a sling.  Her hand is warm and well perfused.  She is moving her hand without difficulty.  Shoulder is swollen, bruised, and tender to palpation.       Medical Decision Making       75 MINUTES SPENT BY ME on the date of service doing chart review, history, exam, documentation & further activities per the note.      Data   ------------------------- PAST 24 HR DATA REVIEWED -----------------------------------------------    I have personally reviewed the following data over the past 24 hrs:    7.5  \   14.9   / 193     140 94 (L) 5.7 (L) /  116 (H)   4.2 15 (L) 0.63 \     ALT: 96 (H) AST: 168 (H) AP: 106 TBILI: 0.6   ALB: 4.6 TOT PROTEIN: 8.1 LIPASE: 24     Trop: 17 (H) BNP: N/A     Procal: N/A CRP: N/A Lactic Acid: 2.2 (H)         Imaging results reviewed over the past 24 hrs:   Recent Results (from the past 24 hour(s))   Head CT w/o contrast    Narrative    EXAM: CT HEAD W/O CONTRAST  LOCATION: [facility]  DATE: 4/27/2024    INDICATION: Fall, head injury.  COMPARISON: None.  TECHNIQUE: Routine CT Head without IV contrast. Multiplanar reformats. Dose reduction techniques were used.    FINDINGS:  No evidence of hemorrhage, mass, or hydrocephalus. Volume loss with background of nonspecific white matter hypoattenuation likely representing chronic small vessel  ischemic change. No acute osseous abnormality.      Impression    IMPRESSION:  1.  No acute intracranial abnormality.   Chest XR,  PA & LAT    Narrative    EXAM: XR CHEST 2 VIEWS  LOCATION: [facility]  DATE: 4/27/2024    INDICATION: fall  COMPARISON: None.      Impression    IMPRESSION: No acute cardiopulmonary abnormality. Comminuted fracture of the left distal clavicle, as seen on dedicated left shoulder radiographs. Left atrial appendage occlusion device.   XR Shoulder Left G/E 3 Views    Narrative    EXAM: XR SHOULDER LEFT G/E 3 VIEWS  LOCATION: [facility]  DATE: 4/27/2024    INDICATION: Left shoulder pain after a fall.  COMPARISON: None.      Impression    IMPRESSION: Comminuted and mildly displaced left clavicle fracture, involving the lateral one third, extending to the acromioclavicular joint. Fracture lines are longitudinally oriented, extending over 6 cm in length. Fracture displacement measures up to   1.1 cm. The acromial clavicular joint remains normally aligned, although the fracture does extend into the joint. The glenohumeral joint is normal. Bones appear demineralized.

## 2024-04-28 ENCOUNTER — APPOINTMENT (OUTPATIENT)
Dept: OCCUPATIONAL THERAPY | Facility: CLINIC | Age: 74
DRG: 563 | End: 2024-04-28
Attending: STUDENT IN AN ORGANIZED HEALTH CARE EDUCATION/TRAINING PROGRAM
Payer: COMMERCIAL

## 2024-04-28 ENCOUNTER — APPOINTMENT (OUTPATIENT)
Dept: PHYSICAL THERAPY | Facility: CLINIC | Age: 74
DRG: 563 | End: 2024-04-28
Attending: STUDENT IN AN ORGANIZED HEALTH CARE EDUCATION/TRAINING PROGRAM
Payer: COMMERCIAL

## 2024-04-28 LAB
ALBUMIN SERPL BCG-MCNC: 3.5 G/DL (ref 3.5–5.2)
ALP SERPL-CCNC: 75 U/L (ref 40–150)
ALT SERPL W P-5'-P-CCNC: 57 U/L (ref 0–50)
ANION GAP SERPL CALCULATED.3IONS-SCNC: 14 MMOL/L (ref 7–15)
AST SERPL W P-5'-P-CCNC: 93 U/L (ref 0–45)
BILIRUB SERPL-MCNC: 0.8 MG/DL
BUN SERPL-MCNC: 5.3 MG/DL (ref 8–23)
CALCIUM SERPL-MCNC: 9 MG/DL (ref 8.8–10.2)
CHLORIDE SERPL-SCNC: 101 MMOL/L (ref 98–107)
CREAT SERPL-MCNC: 0.58 MG/DL (ref 0.51–0.95)
DEPRECATED HCO3 PLAS-SCNC: 22 MMOL/L (ref 22–29)
EGFRCR SERPLBLD CKD-EPI 2021: >90 ML/MIN/1.73M2
ERYTHROCYTE [DISTWIDTH] IN BLOOD BY AUTOMATED COUNT: 12.2 % (ref 10–15)
FOLATE SERPL-MCNC: >40 NG/ML (ref 4.6–34.8)
GLUCOSE SERPL-MCNC: 147 MG/DL (ref 70–99)
HCT VFR BLD AUTO: 34.1 % (ref 35–47)
HGB BLD-MCNC: 12 G/DL (ref 11.7–15.7)
MAGNESIUM SERPL-MCNC: 1.8 MG/DL (ref 1.7–2.3)
MCH RBC QN AUTO: 37 PG (ref 26.5–33)
MCHC RBC AUTO-ENTMCNC: 35.2 G/DL (ref 31.5–36.5)
MCV RBC AUTO: 105 FL (ref 78–100)
PHOSPHATE SERPL-MCNC: 1.5 MG/DL (ref 2.5–4.5)
PHOSPHATE SERPL-MCNC: 2.4 MG/DL (ref 2.5–4.5)
PHOSPHATE SERPL-MCNC: 3.2 MG/DL (ref 2.5–4.5)
PLATELET # BLD AUTO: 128 10E3/UL (ref 150–450)
POTASSIUM SERPL-SCNC: 3.4 MMOL/L (ref 3.4–5.3)
POTASSIUM SERPL-SCNC: 3.9 MMOL/L (ref 3.4–5.3)
PROT SERPL-MCNC: 5.8 G/DL (ref 6.4–8.3)
RBC # BLD AUTO: 3.24 10E6/UL (ref 3.8–5.2)
SODIUM SERPL-SCNC: 137 MMOL/L (ref 135–145)
VIT B12 SERPL-MCNC: 527 PG/ML (ref 232–1245)
WBC # BLD AUTO: 3.3 10E3/UL (ref 4–11)

## 2024-04-28 PROCEDURE — 82746 ASSAY OF FOLIC ACID SERUM: CPT | Performed by: INTERNAL MEDICINE

## 2024-04-28 PROCEDURE — 82607 VITAMIN B-12: CPT | Performed by: INTERNAL MEDICINE

## 2024-04-28 PROCEDURE — 84100 ASSAY OF PHOSPHORUS: CPT | Performed by: STUDENT IN AN ORGANIZED HEALTH CARE EDUCATION/TRAINING PROGRAM

## 2024-04-28 PROCEDURE — 84132 ASSAY OF SERUM POTASSIUM: CPT | Performed by: INTERNAL MEDICINE

## 2024-04-28 PROCEDURE — 258N000003 HC RX IP 258 OP 636: Performed by: INTERNAL MEDICINE

## 2024-04-28 PROCEDURE — 97116 GAIT TRAINING THERAPY: CPT | Mod: GP

## 2024-04-28 PROCEDURE — 97165 OT EVAL LOW COMPLEX 30 MIN: CPT | Mod: GO

## 2024-04-28 PROCEDURE — 250N000009 HC RX 250: Performed by: INTERNAL MEDICINE

## 2024-04-28 PROCEDURE — 250N000013 HC RX MED GY IP 250 OP 250 PS 637: Performed by: STUDENT IN AN ORGANIZED HEALTH CARE EDUCATION/TRAINING PROGRAM

## 2024-04-28 PROCEDURE — 97110 THERAPEUTIC EXERCISES: CPT | Mod: GO

## 2024-04-28 PROCEDURE — 97530 THERAPEUTIC ACTIVITIES: CPT | Mod: GP

## 2024-04-28 PROCEDURE — 250N000013 HC RX MED GY IP 250 OP 250 PS 637: Performed by: INTERNAL MEDICINE

## 2024-04-28 PROCEDURE — 250N000011 HC RX IP 250 OP 636: Performed by: STUDENT IN AN ORGANIZED HEALTH CARE EDUCATION/TRAINING PROGRAM

## 2024-04-28 PROCEDURE — 36415 COLL VENOUS BLD VENIPUNCTURE: CPT | Performed by: STUDENT IN AN ORGANIZED HEALTH CARE EDUCATION/TRAINING PROGRAM

## 2024-04-28 PROCEDURE — 84100 ASSAY OF PHOSPHORUS: CPT | Performed by: INTERNAL MEDICINE

## 2024-04-28 PROCEDURE — 85027 COMPLETE CBC AUTOMATED: CPT | Performed by: STUDENT IN AN ORGANIZED HEALTH CARE EDUCATION/TRAINING PROGRAM

## 2024-04-28 PROCEDURE — 36415 COLL VENOUS BLD VENIPUNCTURE: CPT | Performed by: INTERNAL MEDICINE

## 2024-04-28 PROCEDURE — 120N000001 HC R&B MED SURG/OB

## 2024-04-28 PROCEDURE — 97535 SELF CARE MNGMENT TRAINING: CPT | Mod: GO

## 2024-04-28 PROCEDURE — 97161 PT EVAL LOW COMPLEX 20 MIN: CPT | Mod: GP

## 2024-04-28 PROCEDURE — 83735 ASSAY OF MAGNESIUM: CPT | Performed by: STUDENT IN AN ORGANIZED HEALTH CARE EDUCATION/TRAINING PROGRAM

## 2024-04-28 PROCEDURE — 99232 SBSQ HOSP IP/OBS MODERATE 35: CPT | Performed by: INTERNAL MEDICINE

## 2024-04-28 PROCEDURE — 80053 COMPREHEN METABOLIC PANEL: CPT | Performed by: STUDENT IN AN ORGANIZED HEALTH CARE EDUCATION/TRAINING PROGRAM

## 2024-04-28 RX ORDER — POTASSIUM CHLORIDE 1500 MG/1
40 TABLET, EXTENDED RELEASE ORAL ONCE
Status: COMPLETED | OUTPATIENT
Start: 2024-04-28 | End: 2024-04-28

## 2024-04-28 RX ORDER — MAGNESIUM OXIDE 400 MG/1
400 TABLET ORAL EVERY 4 HOURS
Status: COMPLETED | OUTPATIENT
Start: 2024-04-28 | End: 2024-04-28

## 2024-04-28 RX ADMIN — POTASSIUM CHLORIDE 40 MEQ: 1500 TABLET, EXTENDED RELEASE ORAL at 10:36

## 2024-04-28 RX ADMIN — MAGNESIUM OXIDE TAB 400 MG (241.3 MG ELEMENTAL MG) 400 MG: 400 (241.3 MG) TAB at 14:26

## 2024-04-28 RX ADMIN — SODIUM PHOSPHATE, MONOBASIC, MONOHYDRATE AND SODIUM PHOSPHATE, DIBASIC, ANHYDROUS 15 MMOL: 142; 276 INJECTION, SOLUTION INTRAVENOUS at 10:32

## 2024-04-28 RX ADMIN — SODIUM CHLORIDE, SODIUM LACTATE, POTASSIUM CHLORIDE, CALCIUM CHLORIDE, AND DEXTROSE MONOHYDRATE: 600; 310; 30; 20; 5 INJECTION, SOLUTION INTRAVENOUS at 03:42

## 2024-04-28 RX ADMIN — FOLIC ACID 1 MG: 1 TABLET ORAL at 10:40

## 2024-04-28 RX ADMIN — Medication 1 TABLET: at 10:40

## 2024-04-28 RX ADMIN — POTASSIUM & SODIUM PHOSPHATES POWDER PACK 280-160-250 MG 1 PACKET: 280-160-250 PACK at 21:40

## 2024-04-28 RX ADMIN — Medication 5 MG: at 21:40

## 2024-04-28 RX ADMIN — SODIUM CHLORIDE, SODIUM LACTATE, POTASSIUM CHLORIDE, CALCIUM CHLORIDE, AND DEXTROSE MONOHYDRATE: 600; 310; 30; 20; 5 INJECTION, SOLUTION INTRAVENOUS at 17:29

## 2024-04-28 RX ADMIN — POTASSIUM & SODIUM PHOSPHATES POWDER PACK 280-160-250 MG 1 PACKET: 280-160-250 PACK at 18:23

## 2024-04-28 RX ADMIN — GABAPENTIN 900 MG: 300 CAPSULE ORAL at 00:45

## 2024-04-28 RX ADMIN — GABAPENTIN 900 MG: 300 CAPSULE ORAL at 18:23

## 2024-04-28 RX ADMIN — GABAPENTIN 900 MG: 300 CAPSULE ORAL at 10:40

## 2024-04-28 RX ADMIN — THIAMINE HCL TAB 100 MG 100 MG: 100 TAB at 10:40

## 2024-04-28 RX ADMIN — MAGNESIUM OXIDE TAB 400 MG (241.3 MG ELEMENTAL MG) 400 MG: 400 (241.3 MG) TAB at 10:36

## 2024-04-28 ASSESSMENT — ACTIVITIES OF DAILY LIVING (ADL)
ADLS_ACUITY_SCORE: 26
ADLS_ACUITY_SCORE: 40
ADLS_ACUITY_SCORE: 38
ADLS_ACUITY_SCORE: 38
ADLS_ACUITY_SCORE: 25
ADLS_ACUITY_SCORE: 25
ADLS_ACUITY_SCORE: 26
ADLS_ACUITY_SCORE: 40
ADLS_ACUITY_SCORE: 26
ADLS_ACUITY_SCORE: 38
ADLS_ACUITY_SCORE: 25
ADLS_ACUITY_SCORE: 26
ADLS_ACUITY_SCORE: 38
ADLS_ACUITY_SCORE: 40
ADLS_ACUITY_SCORE: 40
ADLS_ACUITY_SCORE: 25
ADLS_ACUITY_SCORE: 25
ADLS_ACUITY_SCORE: 40
ADLS_ACUITY_SCORE: 38

## 2024-04-28 NOTE — PROGRESS NOTES
[facility] - Internal Medicine Daily Note   Date of Service: 4/28/2024  Patient: Rizwana Aguilar  MRN: [id number]  Admission Date: 4/27/2024  Hospital Day # 1    Assessment & Plan      Rizwana Aguilar is a 73 year old female with PMH significant for paroxysymal afib not on anticoagulation, HTN, alcohol use disorder, alcoholic liver disease, pancreatitis, breast cancer, memory loss, erosive gastropathy admitted on 4/27/2024 after fall with left shoulder pain.      AGMA, now resolved with IV fluids  Mild lactic acidosis, due to EtOH  Alcoholic vs starvation ketosis:  Presents after fall.  Tripped over stair.  Patient endorses nausea and vomiting.  Has not been able to keep even water down.  This has persisted for multiple days.  In addition, patient has been drinking vodka.  She reports 2 drinks daily but this is likely severely under estimated.  AG 31, HCO3 15.  Ketones >8.  Lactic acid 2.2.  Most likely combination alcoholic and starvation ketosis.   Received IV fluid boluses and was maintained on LR at 100 Elemo per hour.  Diet advanced to regular diet and tolerated.  Tachycardia, unspecified, resolved.    History of atrial fibrillation s/p watchman device:  HR 150s on arrival.  Improved to 130s after 2L IV fluids.  This is likely multifactorial in the setting of dehydration, poor oral intake, electrolyte disturbance, acidosis.  In addition, the patient has a history of paroxysmal atrial fibrillation and has not taken her rate control medications for the past couple of days.  EKG on arrival appears consistent with sinus tachycardia, although rate is fast and so it is difficulty to say for sure.  Consideration for afib RVR vs aflutter.  Blood pressure stable.   -Start PTA metoprolol, diltiazem  -Aggressive IVF resuscitation as above  -K goal >4.  Mag goal >2.  -Treat alcohol withdrawal, ketoacidosis       Mechanical fall complicated  displaced left clavicular fracture:  Pt reports tripping and falling on stairs.   Denies LOC or trauma.  Hurt her left shoulder.  XR imaging shows mildly displaced left clavical fracture.  Exam shows bruising around the area.   reports that she was at nursing home approximately 1 year ago.  She was doing quite well while they are with physical therapy.  She then discharged herself and returned home.  She since then has had significant decline per her .  -Continue sling started in the ER  -Will consult ortho  -Anticipate non-op management so will not keep NPO  -PT/OT eval     Transaminitis  Alcohol use disorder:  Patient reports 2 watered down vodka drinks daily.  Suspect she severely underplays her use.   reports a minimum of 4 drinks a night.  Generally 2 vodka drinks, 2 large pours of alcohol, and this may be followed by a glass of daniel before bed.  This has complicated family matters for 5 years now per .  ETOH negative on arrival.  , ALT 96.  Fairly consistent with alcohol related injury pattern.  Patient tremulous.  High risk of alcohol withdrawal  -CIWA protocol with ativan and gabapentin.   -Thiamine & folate  -Offer CD/SW when clinically improved  -Repeat CMP in AM     Hypertension:  Blood pressure 170s systolic on arrival.  Patient reports she has not been able to keep down her BP medications over the past couple of days.   -Restart PTA metoprolol  -Restart other blood pressure medications once med rec is complete - patient unable to tell me her meds     Hypomagnesemia: Likely related to poor PO intake.  Replacement protocol ordered.     Breast cancer: s/p lumpectomy: Follows with oncology at [facility]. Decided against oncotype testing and chemo and adjuvant hormonal therapy.     Memory loss: Per chart review this is thought related to alcohol use.  Complicates cares.  At risk of delirium.      Urinary incontinence:  Chronic problem for her.      Erosive gastropathy: Pantoprazole in place of PTA omeprazole  Dizziness: I believe she is prescribed  meclizine  MDD:    Thrombocytopenia.  Platelet count decreased from 180 down to 128.  She was receiving Lovenox 40 mg subcu daily.  Suspect platelet count dropped in the setting of EtOH use disorder versus heparin-induced thrombocytopenia, which I think is unlikely the etiology at this moment..  No any bleeding diathesis.  Will check CBC in a.m. tomorrow.  Lovenox 40 mg subcu is put on hold.     Disposition: Home tomorrow     Diet: Regular Diet Adult    DVT Prophylaxis: Enoxaparin (Lovenox) SQ  [place] Catheter: Not present  Lines: None     Cardiac Monitoring: None  Code Status:  FULL  Isidoro Dominguez MD  Internal Medicine Staff Hospitalist   [facility]   Pager: [phone]  Page Cross Cover after 5 pm: pager [phone]   ___________________________________________________________________    Subjective & Interval Hx:    Patient is awake alert oriented x 4.  States she ate for more today for breakfast.  Denies any dizziness or lightheadedness when she went to use the bathroom by himself.  Denies any shortness of breath, nausea abdominal pain or diarrhea.  She received sodium phosphorous for low phosphorus level  PT and OT eval is pending.  Last 24 hr care team notes reviewed.   ROS:  4 point ROS including Respiratory, CV, GI and , other than that noted in the HPI, is negative.    Medications: Reviewed in EPIC. List below for reference    Current Facility-Administered Medications:     acetaminophen (TYLENOL) tablet 650 mg, 650 mg, Oral, Q4H PRN **OR** acetaminophen (TYLENOL) Suppository 650 mg, 650 mg, Rectal, Q4H PRN, Luke Yen,     calcium carbonate (TUMS) chewable tablet 1,000 mg, 1,000 mg, Oral, 4x Daily PRN, Luke Yen,     dextrose 5% in lactated ringers infusion, , Intravenous, Continuous, Luke Yen DO, Last Rate: 100 mL/hr at 04/28/24 0342, New Bag at 04/28/24 0342    diltiazem ER COATED BEADS (CARDIZEM CD/CARTIA XT) 24 hr capsule 120 mg, 120 mg, Oral, Daily, Elemo,  Isidoro BROWNE MD, 120 mg at 04/27/24 2037    [Held by provider] enoxaparin ANTICOAGULANT (LOVENOX) injection 40 mg, 40 mg, Subcutaneous, Q24H, Luke Yen DO, 40 mg at 04/27/24 2132    flumazenil (ROMAZICON) injection 0.2 mg, 0.2 mg, Intravenous, q1 min prn, Luke Yen DO    folic acid (FOLVITE) tablet 1 mg, 1 mg, Oral, Daily, Luke Yen DO, 1 mg at 04/28/24 1040    [START ON 5/5/2024] gabapentin (NEURONTIN) capsule 100 mg, 100 mg, Oral, Q8H, Luke Yen DO    [START ON 5/3/2024] gabapentin (NEURONTIN) capsule 300 mg, 300 mg, Oral, Q8H, Luke Yen DO    [START ON 5/1/2024] gabapentin (NEURONTIN) capsule 600 mg, 600 mg, Oral, Q8H, Luke Yen DO    gabapentin (NEURONTIN) capsule 900 mg, 900 mg, Oral, Q8H, Luke Yen DO, 900 mg at 04/28/24 1040    OLANZapine zydis (zyPREXA) ODT tab 5-10 mg, 5-10 mg, Oral, Q6H PRN **OR** haloperidol lactate (HALDOL) injection 2.5-5 mg, 2.5-5 mg, Intravenous, Q6H PRN, Luke Yen DO    lidocaine (LMX4) cream, , Topical, Q1H PRN, Luke Yen DO    lidocaine 1 % 0.1-1 mL, 0.1-1 mL, Other, Q1H PRN, Luke Yen DO    LORazepam (ATIVAN) tablet 1-2 mg, 1-2 mg, Oral, Q30 Min PRN **OR** LORazepam (ATIVAN) injection 1-2 mg, 1-2 mg, Intravenous, Q30 Min PRN, Luke Yen DO    magnesium oxide (MAG-OX) tablet 400 mg, 400 mg, Oral, Q4H, Elemo, Isidoro BROWNE MD, 400 mg at 04/28/24 1036    melatonin tablet 5 mg, 5 mg, Oral, QPM PRN, Luke Yen DO, 5 mg at 04/27/24 2132    metoprolol succinate ER (TOPROL XL) 24 hr tablet 50 mg, 50 mg, Oral, Daily, Isidoro Dominguez MD, 50 mg at 04/27/24 1710    multivitamin w/minerals (THERA-VIT-M) tablet 1 tablet, 1 tablet, Oral, Daily, Luke Yen DO, 1 tablet at 04/28/24 1040    ondansetron (ZOFRAN ODT) ODT tab 4 mg, 4 mg, Oral, Q6H PRN **OR** ondansetron (ZOFRAN) injection 4 mg, 4 mg, Intravenous, Q6H PRN, Luke Yen DO    prochlorperazine (COMPAZINE) injection 5 mg, 5 mg, Intravenous, Q6H PRN  "**OR** prochlorperazine (COMPAZINE) tablet 5 mg, 5 mg, Oral, Q6H PRN **OR** prochlorperazine (COMPAZINE) suppository 12.5 mg, 12.5 mg, Rectal, Q12H PRN, Luke Yen,     senna-docusate (SENOKOT-S/PERICOLACE) 8.6-50 MG per tablet 1 tablet, 1 tablet, Oral, BID PRN **OR** senna-docusate (SENOKOT-S/PERICOLACE) 8.6-50 MG per tablet 2 tablet, 2 tablet, Oral, BID PRN, Luke Yen,     sodium chloride (PF) 0.9% PF flush 3 mL, 3 mL, Intracatheter, Q8H, Luke Yen, , 3 mL at 04/27/24 2037    sodium chloride (PF) 0.9% PF flush 3 mL, 3 mL, Intracatheter, q1 min prn, Luke Yen DO    sodium phosphate 15 mmol in NS 250mL intermittent infusion, 15 mmol, Intravenous, Once, Isidoro Dominguez MD, 15 mmol at 04/28/24 1032    thiamine (B-1) tablet 100 mg, 100 mg, Oral, Daily, Luke Yen DO, 100 mg at 04/28/24 1040    Physical Exam:    /56 (BP Location: Right arm)   Pulse 75   Temp 98  F (36.7  C) (Temporal)   Resp 16   Ht 1.676 m (5' 6\")   Wt 63.5 kg (140 lb)   SpO2 97%   BMI 22.60 kg/m       GENERAL: Alert and oriented x 3. NAD.   HEENT: Anicteric sclera. Mucous membranes moist.   CV: RRR. S1, S2. No murmurs appreciated.   RESPIRATORY: Effort normal on room air. Lungs CTAB with no wheezing, rales, rhonchi.   GI: Abdomen soft and non distended with normoactive bowel sounds present in all quadrants. No tenderness, rebound, guarding.   NEUROLOGICAL: No focal deficits. Moves all extremities.    EXTREMITIES: No peripheral edema. Intact bilateral pedal pulses.   SKIN: No jaundice. No rashes.     Labs & Studies of Note: I personally reviewed the following studies:  CBC RESULTS:   Recent Labs   Lab Test 04/28/24  0732   WBC 3.3*   RBC 3.24*   HGB 12.0   HCT 34.1*   *   MCH 37.0*   MCHC 35.2   RDW 12.2   *     Last Comprehensive Metabolic Panel:  Lab Results   Component Value Date     04/28/2024    POTASSIUM 3.4 04/28/2024    CHLORIDE 101 04/28/2024    CO2 22 04/28/2024    " ANIONGAP 14 04/28/2024     (H) 04/28/2024    BUN 5.3 (L) 04/28/2024    CR 0.58 04/28/2024    GFRESTIMATED >90 04/28/2024    KHUSHI 9.0 04/28/2024

## 2024-04-28 NOTE — PLAN OF CARE
"  Problem: Adult Inpatient Plan of Care  Goal: Plan of Care Review  Description: The Plan of Care Review/Shift note should be completed every shift.  The Outcome Evaluation is a brief statement about your assessment that the patient is improving, declining, or no change.  This information will be displayed automatically on your shift  note.  Outcome: Progressing  Flowsheets (Taken 4/28/2024 1600)  Outcome Evaluation: A/Ox4.  Reports minimal pain to left shoulder, denies need for any pain medications.  Sling in place to left arm, although doesn't like to wear it while in bed. CMS intact.  Pt very impulsive at times, and needs reminders to not use her left arm as much.  Unsteady while ambulating, needs Ax1 et gait belt.  Denies dizziness.  Blood pressure meds held this AM.  Electrolytes replaced today.  Tolerating regular diet-denies nausea.  IV fluids infusing. Home vs TCU at discharge.  Plan of Care Reviewed With: patient  Overall Patient Progress: improving  Goal: Patient-Specific Goal (Individualized)  Description: You can add care plan individualizations to a care plan. Examples of Individualization might be:  \"Parent requests to be called daily at 9am for status\", \"I have a hard time hearing out of my right ear\", or \"Do not touch me to wake me up as it startles  me\".  Outcome: Progressing  Goal: Absence of Hospital-Acquired Illness or Injury  Outcome: Progressing  Intervention: Identify and Manage Fall Risk  Recent Flowsheet Documentation  Taken 4/28/2024 0853 by Jihan Walker RN  Safety Promotion/Fall Prevention:   activity supervised   assistive device/personal items within reach   clutter free environment maintained   increased rounding and observation   increase visualization of patient   nonskid shoes/slippers when out of bed   patient and family education   room door open   safety round/check completed  Intervention: Prevent Skin Injury  Recent Flowsheet Documentation  Taken 4/28/2024 0853 by " Jihan Walker RN  Body Position: supine, head elevated  Goal: Optimal Comfort and Wellbeing  Outcome: Progressing  Goal: Readiness for Transition of Care  Outcome: Progressing     Problem: Fall Injury Risk  Goal: Absence of Fall and Fall-Related Injury  Outcome: Progressing  Intervention: Identify and Manage Contributors  Recent Flowsheet Documentation  Taken 4/28/2024 0853 by Jihan Walker RN  Medication Review/Management: medications reviewed  Intervention: Promote Injury-Free Environment  Recent Flowsheet Documentation  Taken 4/28/2024 0853 by Jihan Walker RN  Safety Promotion/Fall Prevention:   activity supervised   assistive device/personal items within reach   clutter free environment maintained   increased rounding and observation   increase visualization of patient   nonskid shoes/slippers when out of bed   patient and family education   room door open   safety round/check completed     Problem: Comorbidity Management  Goal: Blood Pressure in Desired Range  Outcome: Progressing  Intervention: Maintain Blood Pressure Management  Recent Flowsheet Documentation  Taken 4/28/2024 0853 by Jihan Walker RN  Medication Review/Management: medications reviewed   Goal Outcome Evaluation:      Plan of Care Reviewed With: patient    Overall Patient Progress: improvingOverall Patient Progress: improving    Outcome Evaluation: A/Ox4.  Reports minimal pain to left shoulder, denies need for any pain medications.  Sling in place to left arm, although doesn't like to wear it while in bed. CMS intact.  Pt very impulsive at times, and needs reminders to not use her left arm as much.  Unsteady while ambulating, needs Ax1 et gait belt.  Denies dizziness.  Blood pressure meds held this AM.  Electrolytes replaced today.  Tolerating regular diet-denies nausea.  IV fluids infusing. Home vs TCU at discharge.

## 2024-04-28 NOTE — PROGRESS NOTES
"   04/28/24 4005   Appointment Info   Signing Clinician's Name / Credentials (OT) Kenyatta Benoit, MS, OTR/L   Living Environment   People in Home spouse   Current Living Arrangements house   Home Accessibility stairs to enter home   Living Environment Comments Reports can have near 24 hr assist at discharge   Self-Care   Usual Activity Tolerance moderate   Current Activity Tolerance fair   Equipment Currently Used at Home cane, quad;shower chair  (Step in shower. Sleeps on couch)   Fall history within last six months yes   Number of times patient has fallen within last six months 1   Activity/Exercise/Self-Care Comment Pt reports at baseline she is independent in self-cares. Uses quad cane PRN. Reports recent balance issues due to \"crystals in my ear\"   Instrumental Activities of Daily Living (IADL)   Previous Responsibilities   (Shares with spouse. Drives intermittently)   General Information   Onset of Illness/Injury or Date of Surgery 04/27/24   Referring Physician Luke Yen, DO   Patient/Family Therapy Goal Statement (OT) Improve independence. Prefers to return home   Additional Occupational Profile Info/Pertinent History of Current Problem 73 year old female with PMH significant for paroxysymal afib not on anticoagulation, HTN, alcohol use disorder, alcoholic liver disease, pancreatitis, breast cancer, memory loss, erosive gastropathy admitted on 4/27/2024 after fall with left shoulder pain. Found to have  Comminuted and mildly displaced left clavicle fracture, involving the lateral one third, extending to the acromioclavicular joint. Fracture lines are longitudinally oriented, extending over 6 cm in length. Fracture displacement measures up to   1.1 cm.   Existing Precautions/Restrictions fall;weight bearing  (LUE sling for comfort)   Left Upper Extremity (Weight-bearing Status) non weight-bearing (NWB)   Cognitive Status Examination   Affect/Mental Status (Cognitive) WFL   Follows Commands follows " two-step commands;50-74% accuracy   Memory Deficit moderate deficit;short-term memory   Executive Function Deficit minimal deficit   Cognitive Status Comments History of memory loss. Pt needing very frequent reminders during session for LUE NWB   Visual Perception   Visual Impairment/Limitations corrective lenses full-time  (reports right eye slightly blurry after fall due to eye scraping carpet)   Sensory   Sensory Comments Pt denies BUE/BLE numbness or tingling   Pain Assessment   Patient Currently in Pain Yes, see Vital Sign flowsheet   Strength Comprehensive (MMT)   Comment, General Manual Muscle Testing (MMT) Assessment RUE dominant. Generalized weakness   Transfers   Transfers toilet transfer;shower transfer   Shower Transfer   [place] Level (Shower Transfer) minimum assist (75% patient effort)   Toilet Transfer   [place] Level (Toilet Transfer) moderate assist (50% patient effort)   Activities of Daily Living   BADL Assessment/Intervention upper body dressing;lower body dressing;toileting   Upper Body Dressing Assessment/Training   [place] Level (Upper Body Dressing) maximum assist (25% patient effort)   Lower Body Dressing Assessment/Training   [place] Level (Lower Body Dressing) moderate assist (50% patient effort)   Toileting   [place] Level (Toileting) minimum assist (75% patient effort)   Clinical Impression   Criteria for Skilled Therapeutic Interventions Met (OT) Yes, treatment indicated   OT Diagnosis Decline function   OT Problem List-Impairments impacting ADL activity tolerance impaired;balance;cognition;mobility;pain   Assessment of Occupational Performance 5 or more Performance Deficits   Identified Performance Deficits UB dressing, LB dressing, toileting, bathing, functional mobility, IADLs   Planned Therapy Interventions (OT) ADL retraining;IADL retraining;cognition;home program guidelines;progressive activity/exercise;transfer training;ROM   Clinical Decision Making  Complexity (OT) problem focused assessment/low complexity   Risk & Benefits of therapy have been explained evaluation/treatment results reviewed;care plan/treatment goals reviewed;risks/benefits reviewed;current/potential barriers reviewed;participants voiced agreement with care plan;participants included;patient   OT Total Evaluation Time   OT Eval, Low Complexity Minutes (09475) 8   OT Goals   Therapy Frequency (OT) Daily   OT Predicted Duration/Target Date for Goal Attainment 05/03/24   OT Goals Upper Body Dressing;Lower Body Dressing;Transfers;Toilet Transfer/Toileting;OT Goal 1;OT Goal 2;OT Goal 3   OT: Upper Body Dressing Supervision/stand-by assist;within precautions   OT: Lower Body Dressing Supervision/stand-by assist;within precautions   OT: Transfer Supervision/stand-by assist  (Step in shower transfer)   OT: Toilet Transfer/Toileting Supervision/stand-by assist;toilet transfer;cleaning and garment management;using adaptive equipment;within precautions   OT: Goal 1 Pt will demonstrate independence in LUE HEP to maintain LUE joint integrity   OT: Goal 2 Pt will verbalize understanding of recommended self care adaptive equipment, fall prevention strategies, pain management strategies   Interventions   Interventions Quick Adds Self-Care/Home Management;Therapeutic Procedures/Exercise   Self-Care/Home Management   Self-Care/Home Mgmt/ADL, Compensatory, Meal Prep Minutes (60121) 24   Symptoms Noted During/After Treatment (Meal Preparation/Planning Training) fatigue;increased pain   Treatment Detail/Skilled Intervention Upon arrival pt semi-supine in bed and agreeable to therapy. Semi-supine to sit with SBA and max cues to not use LUE. Pt required Cris and max cues for proper fit of LUE sling and how to don. Padding added at neck for comfort. Encouraged use of sling to help with maintaining LUE NWB. Sit to stand from EOB with modA and cues for technique. Pt ambulated within room and bathroom with quad cane with  CGA due to unsteadiness and required cues for cane use. Pt completed toilet transfer using comfort height toilet and grab bar with Cris and reminder cues to not use LUE. Sit to stand from toilet with Cris. Pt participated in education regarding pain management strategies and was issued ice pack and LUE elevated on pillow. Collaborated with pt to have spouse present for OT tomorrow morning after 9am. Pt participated in education regarding benefit of raised toilet seat, reacher, grab bars and where to obtain and was issued adaptive equipment handout. Pt left in recliner with alarm on and needed supplies   Therapeutic Procedures/Exercise   Therapeutic Procedure: strength, endurance, ROM, flexibillity minutes (38680) 10   Symptoms Noted During/After Treatment increased pain   Treatment Detail/Skilled Intervention Pt seen for a focus on maintaining LUE joint integrity and preventing edema in anticipation of future LUE functional use. Pt engaged in 15 reps of the following LUE: AROM wrist flexion/extension, AROM gross grasp/release, AROM radial/ulnar deviation, AAROM elbow flexion/extension. Pt participated in education regarding intensity, frequency, duration, signs of intolerance and was issued educational handout.   OT Discharge Planning   OT Plan Plan to have spouse present. Shower transfer. Toileting. TB dressing. LUE HEP. LUE NWB compliance. Review AE recs   OT Discharge Recommendation (DC Rec) Transitional Care Facility   OT Rationale for DC Rec Pt currently requiring min-modA of 1 for toileting, dressing, bathing. Requiring hands on assist with ambulation in room due to unsteadiness. Pt needs frequent reminders for LUE NWB during daily activities- does have history of memory loss. Recommend continued skilled OT in TCU to progress pt safety and independence and reduce caregiver burden. IF returns home would recommend home OT/PT/HHA, 24 hr supervision, Ax1 with self cares, total assist with IADLS (med management,  cooking, cleaning, etc). Recommend raised toilet seat, reacher, grab bars. Pt may benefit from OP neuropsych testing for cognition    Total Session Time   Timed Code Treatment Minutes 34   Total Session Time (sum of timed and untimed services) 42

## 2024-04-28 NOTE — PROGRESS NOTES
04/28/24 8694   Appointment Info   Signing Clinician's Name / Credentials (PT) Rodríguez Mcmahan, PT, DPT   Rehab Comments (PT) gena SANCHEZ for comfort.   Living Environment   People in Home spouse   Current Living Arrangements house   Home Accessibility stairs within home   Number of Stairs, Within Home, Primary five   Stair Railings, Within Home, Primary railings on both sides of stairs   Transportation Anticipated family or friend will provide   Living Environment Comments Patient lives with spouse in multi level house, level entry through back door. has 5 stairs to navigate in house with handrails. Will not be going upstairs to bedroom at discharge.   Self-Care   Usual Activity Tolerance moderate   Current Activity Tolerance fair   Equipment Currently Used at Home cane, quad   Fall history within last six months yes   Number of times patient has fallen within last six months 1   Activity/Exercise/Self-Care Comment Patient reported being independent with mobility and cares at baseline with use of quad cane.   General Information   Onset of Illness/Injury or Date of Surgery 04/27/24   Referring Physician Luke Yen,    Patient/Family Therapy Goals Statement (PT) Patient stated that she is going home tomorrow.   Pertinent History of Current Problem (include personal factors and/or comorbidities that impact the POC) 73 year old female with PMH significant for paroxysymal afib not on anticoagulation, HTN, alcohol use disorder, alcoholic liver disease, pancreatitis, breast cancer, memory loss, erosive gastropathy admitted on 4/27/2024 after fall with left shoulder pain.   Existing Precautions/Restrictions fall;weight bearing   Weight-Bearing Status - LUE nonweight-bearing   Cognition   Affect/Mental Status (Cognition) WFL   Orientation Status (Cognition) oriented x 4   Follows Commands (Cognition) WFL   Pain Assessment   Patient Currently in Pain Yes, see Vital Sign flowsheet  (LUE pain)   Strength (Manual  Muscle Testing)   Strength (Manual Muscle Testing) Deficits observed during functional mobility   Bed Mobility   Comment, (Bed Mobility) sit to supine with min assist x1   Transfers   Comment, (Transfers) sit<>stand min assist x1 with quad cane   Gait/Stairs (Locomotion)   Comment, (Gait/Stairs) ambulating with quad cane and min assist x1   Balance   Balance Comments demonstrates deficits in balance requiring assist x1 and assistive device for safety   Sensory Examination   Sensory Perception patient reports no sensory changes   Clinical Impression   Criteria for Skilled Therapeutic Intervention Yes, treatment indicated   PT Diagnosis (PT) impaired mobility   Influenced by the following impairments weakness, reduced activity tolerance, impaired balance, increased pain levels.   Functional limitations due to impairments impaired functional mobility, impaired gait, transfers, bed mobility, and stair navigation   Clinical Presentation (PT Evaluation Complexity) stable   Clinical Presentation Rationale clinical judgment   Clinical Decision Making (Complexity) low complexity   Planned Therapy Interventions (PT) balance training;bed mobility training;gait training;neuromuscular re-education;patient/family education;stair training;strengthening;transfer training;progressive activity/exercise   Risk & Benefits of therapy have been explained evaluation/treatment results reviewed;care plan/treatment goals reviewed;risks/benefits reviewed;participants voiced agreement with care plan;current/potential barriers reviewed;participants included;patient   PT Total Evaluation Time   PT Eval, Low Complexity Minutes (00135) 10   Physical Therapy Goals   PT Frequency Daily   PT Predicted Duration/Target Date for Goal Attainment 05/03/24   PT Goals Bed Mobility;Transfers;Gait;Stairs   PT: Bed Mobility Supervision/stand-by assist;Supine to/from sit   PT: Transfers Supervision/stand-by assist;Sit to/from stand;Assistive device   PT: Gait  Supervision/stand-by assist;Quad cane;100 feet   PT: Stairs 5 stairs;Minimal assist;Rail on right   Interventions   Interventions Quick Adds Gait Training;Therapeutic Activity   Therapeutic Activity   Therapeutic Activities: dynamic activities to improve functional performance Minutes (56572) 16   Symptoms Noted During/After Treatment Fatigue;Increased pain   Treatment Detail/Skilled Intervention Patient seated in recliner at beginning of session, agreeable to participate in PT. Educated patient on NWB to LUE per MD orders, sling on when OOB. Patient requesting RN due to having a BM in briefs while seated. Yovany RN to assist during session. PAtient ambulating to bathroom with min assist x1 and quad cane. Performing sit<>stand from toilet with min assist x1 and use of LUE on grab bar despite cues for NWB to LUE per MD orders. Performing matilde cares during session. Cued patient to turn and sit in bed but patient nearly losing balance while turning requiring mod assist x1 and reaching with LUE for bedrail. Cues for NWB to LUE and relaxing L arm in sling when mobilizing. Cues and visual demonstration for safety and sequencing with quad cane when turning prior to sit down. Additional sit<>stands with min assist x1 and quad cane, cues for hand placement and anterior weight shift. Patient performing sit to supine with min assist x1. Patient initially reaching back with LUE to use during transfer, cues and facilitation for NWB to LUE and to place on abdomen during session rather than use during transfer. Patient supine in bed at end of session with call light next to her and bed alarm on. Discussed discharge recommendations, declines TCU and stated that she will be returning home with  who is there 24/7.   Gait Training   Gait Training Minutes (66893) 10   Symptoms Noted During/After Treatment (Gait Training) fatigue;increased pain   Treatment Detail/Skilled Intervention Patient completing initial bout of ambulation  for 20' to bathroom with quad cane and min assist x1. PAtient initially reaching for IV pole with RUE and holding cane with LUE in preparation for ambulation. Education and cues on NWB to LUE secondary to fractures. Ambulating with quad cane in RUE. Demonstrating shuffling gait pattern and not placing all 4 points of quad cane on ground. Reaching for items to stabilize on while ambulating with LUE, cues for NWB to LUE while ambulating. Instability throughout requiring assist maintain balance. Cues and visual demonstration for proper sequencing and stepping while ambulating with quad cane. Cues for all 4 points of quad cane on ground prior to stepping, slight improvement with cues. Continues to demosntrate decreased step length and gait velocity while ambulating for 60' with min assist x1. Discussed trying stair navigation at morning session to determine ability to navigate stairs at home.   Distance in Feet 20', 60'   PT Discharge Planning   PT Plan progress ambulation distance and stair navigation, repeated sit<>stands, progress independence with bed mobility   PT Discharge Recommendation (DC Rec) home with assist;home with home care physical therapy;Transitional Care Facility   PT Rationale for DC Rec Patient below baseline functional mobility. Presents with non-weight bearing to LUE, impaired balance, and weakness resulting in the need for quad cane and assist x1 to safely mobilize. Patient declining discussion about TCU, stated that  is at home 24/7 and will be helping her. IF patient has 24/7 supervision and assist for all mobility and cares, recommend discharge home with  PT for improvement of strength, balance, and independence with functional mobility. Would need assist x1 for stair navigation. IF patient does not have this level of assist at home, recommend TCU to address aforementioned deficits.   PT Brief overview of current status assist x1 with quad cane   Total Session Time   Timed Code  Treatment Minutes 26   Total Session Time (sum of timed and untimed services) 36

## 2024-04-28 NOTE — CONSULTS
[facility]    Orthopedic Consultation    Rizwana Aguilar MRN# [id number]   Age: 73 year old YOB: 1950     Date of Admission:  4/27/2024    Reason for consult: Left distal clavicle fracture       Requesting physician: Dr Yen       Level of consult: One-time consult to assist in determining a diagnosis and to recommend an appropriate treatment plan           Assessment and Plan:   Assessment:   Left distal clavicle fracture amenable to nonoperative management        Plan:   Plan for nonoperative management of left distal clavicle fracture  Sling for comfort  Ice to shoulder  Pain control  Nonweightbearing left upper extremity  Follow-up with me in 1 to 2 weeks for repeat imaging. Can call my care coordinator Nick at [phone] to schedule appointment.             Chief Complaint:   Left distal clavicle fracture         History of Present Illness:   This patient is a 73 year old female who presents with the following condition requiring a hospital admission:    73-year-old female with multiple medical problems who states 3 to 4 days ago she had a mechanical fall when she tripped and fell and landed onto her left arm.  She had immediate pain in the shoulder.  She was brought to the ER yesterday and was found to have a mildly displaced distal clavicle fracture.  She was also found to be in ketoacidosis with tachycardia and other medical issues.  She notes significant bruising around the left shoulder.  She was placed into a sling, admitted to the hospitalist for management of her medical issues, and orthopedics was consulted for further evaluation.  She denies any numbness or tingling in the left upper extremity.          Past Medical History:     Past Medical History:   Diagnosis Date    Hypertension              Past Surgical History:     Past Surgical History:   Procedure Laterality Date    GYN SURGERY               Social History:     Social History     Tobacco Use    Smoking  status: Never    Smokeless tobacco: Never   Substance Use Topics    Alcohol use: Not on file             Family History:   No family history on file.          Immunizations:     VACCINE/DOSE   Diptheria   DPT   DTAP   HBIG   Hepatitis A   Hepatitis B   HIB   Influenza   Measles   Meningococcal   MMR   Mumps   Pneumococcal   Polio   Rubella   Small Pox   TDAP   Varicella   Zoster             Allergies:     Allergies   Allergen Reactions    Penicillins Unknown             Medications:     Current Facility-Administered Medications   Medication Dose Route Frequency Provider Last Rate Last Admin    acetaminophen (TYLENOL) tablet 650 mg  650 mg Oral Q4H PRN Luke Yen DO        Or    acetaminophen (TYLENOL) Suppository 650 mg  650 mg Rectal Q4H PRN Luke Yen DO        calcium carbonate (TUMS) chewable tablet 1,000 mg  1,000 mg Oral 4x Daily PRN Luke Yen DO        dextrose 5% in lactated ringers infusion   Intravenous Continuous Luke Yen  mL/hr at 04/28/24 0342 New Bag at 04/28/24 0342    diltiazem ER COATED BEADS (CARDIZEM CD/CARTIA XT) 24 hr capsule 120 mg  120 mg Oral Daily Isidoro Dominguez MD   120 mg at 04/27/24 2037    [Held by provider] enoxaparin ANTICOAGULANT (LOVENOX) injection 40 mg  40 mg Subcutaneous Q24H Luke Yen DO   40 mg at 04/27/24 2132    flumazenil (ROMAZICON) injection 0.2 mg  0.2 mg Intravenous q1 min prn Luke Yen DO        folic acid (FOLVITE) tablet 1 mg  1 mg Oral Daily Luke Yen DO   1 mg at 04/28/24 1040    [START ON 5/5/2024] gabapentin (NEURONTIN) capsule 100 mg  100 mg Oral Q8H Luke Yen DO        [START ON 5/3/2024] gabapentin (NEURONTIN) capsule 300 mg  300 mg Oral Q8H Luke Yen DO        [START ON 5/1/2024] gabapentin (NEURONTIN) capsule 600 mg  600 mg Oral Q8H Luke Yen DO        gabapentin (NEURONTIN) capsule 900 mg  900 mg Oral Q8H Luke Yen DO   900 mg at 04/28/24 1040    OLANZapine zydis (zyPREXA)  ODT tab 5-10 mg  5-10 mg Oral Q6H PRN Luke Yen DO        Or    haloperidol lactate (HALDOL) injection 2.5-5 mg  2.5-5 mg Intravenous Q6H PRN Luke Yen DO        lidocaine (LMX4) cream   Topical Q1H PRN Luke Yen DO        lidocaine 1 % 0.1-1 mL  0.1-1 mL Other Q1H PRN Luke Yen DO        LORazepam (ATIVAN) tablet 1-2 mg  1-2 mg Oral Q30 Min PRN Luke Yen DO        Or    LORazepam (ATIVAN) injection 1-2 mg  1-2 mg Intravenous Q30 Min PRN Luke Yen DO        magnesium oxide (MAG-OX) tablet 400 mg  400 mg Oral Q4H Isidoro Dominguez MD   400 mg at 04/28/24 1036    melatonin tablet 5 mg  5 mg Oral QPM PRN Luke Yen DO   5 mg at 04/27/24 2132    metoprolol succinate ER (TOPROL XL) 24 hr tablet 50 mg  50 mg Oral Daily Isidoro Dominguez MD   50 mg at 04/27/24 1710    multivitamin w/minerals (THERA-VIT-M) tablet 1 tablet  1 tablet Oral Daily Luke Yen DO   1 tablet at 04/28/24 1040    ondansetron (ZOFRAN ODT) ODT tab 4 mg  4 mg Oral Q6H PRN Luke Yen DO        Or    ondansetron (ZOFRAN) injection 4 mg  4 mg Intravenous Q6H PRN Luke Yen DO        prochlorperazine (COMPAZINE) injection 5 mg  5 mg Intravenous Q6H PRN Luke Yen DO        Or    prochlorperazine (COMPAZINE) tablet 5 mg  5 mg Oral Q6H PRN Luke Yen DO        Or    prochlorperazine (COMPAZINE) suppository 12.5 mg  12.5 mg Rectal Q12H PRN Luke Yen DO        senna-docusate (SENOKOT-S/PERICOLACE) 8.6-50 MG per tablet 1 tablet  1 tablet Oral BID PRN Luke Yen DO        Or    senna-docusate (SENOKOT-S/PERICOLACE) 8.6-50 MG per tablet 2 tablet  2 tablet Oral BID PRN Luke Yen DO        sodium chloride (PF) 0.9% PF flush 3 mL  3 mL Intracatheter Q8H Luke Yen DO   3 mL at 04/27/24 2037    sodium chloride (PF) 0.9% PF flush 3 mL  3 mL Intracatheter q1 min prn Luke Yen DO        sodium phosphate 15 mmol in NS 250mL intermittent infusion  15 mmol  "Intravenous Once Isidoro Dominguez MD   15 mmol at 04/28/24 1032    thiamine (B-1) tablet 100 mg  100 mg Oral Daily Luke Yen DO   100 mg at 04/28/24 1040             Review of Systems:   All review of systems was performed and was negative except as per hpi            Physical Exam:   All vitals have been reviewed  Patient Vitals for the past 24 hrs:   BP Temp Temp src Pulse Resp SpO2 Height Weight   04/28/24 1256 101/60 98.5  F (36.9  C) Temporal 80 16 98 % -- --   04/28/24 1028 105/56 -- -- 75 -- -- -- --   04/28/24 0730 104/58 98  F (36.7  C) Temporal 66 16 97 % -- --   04/28/24 0503 103/57 98.1  F (36.7  C) Temporal 67 16 98 % -- --   04/28/24 0044 115/61 98.2  F (36.8  C) Temporal 77 18 99 % -- --   04/27/24 2033 (!) 157/86 99  F (37.2  C) Temporal 80 20 98 % -- --   04/27/24 1830 (!) 152/92 -- -- 97 20 97 % -- --   04/27/24 1745 (!) 163/110 -- -- 115 10 98 % -- --   04/27/24 1730 -- -- -- (!) 141 30 98 % -- --   04/27/24 1715 (!) 171/111 -- -- (!) 152 25 99 % -- --   04/27/24 1710 (!) 152/101 -- -- (!) 128 23 98 % -- --   04/27/24 1648 -- -- -- (!) 138 -- 99 % -- --   04/27/24 1633 (!) 166/97 -- -- -- -- -- -- --   04/27/24 1553 -- -- -- (!) 137 -- -- -- --   04/27/24 1538 -- -- -- -- 24 98 % -- --   04/27/24 1523 (!) 177/111 -- -- -- -- -- -- --   04/27/24 1521 -- -- -- (!) 141 -- 99 % -- --   04/27/24 1506 (!) 169/96 -- -- -- -- -- -- --   04/27/24 1413 -- -- -- (!) 141 16 99 % -- --   04/27/24 1358 (!) 156/97 -- -- -- -- -- -- --   04/27/24 1343 (!) 173/93 -- -- -- -- -- -- --   04/27/24 1328 -- 98.2  F (36.8  C) Oral -- -- -- -- --   04/27/24 1327 -- -- -- -- -- -- 1.676 m (5' 6\") 63.5 kg (140 lb)   04/27/24 1324 (!) 176/107 -- -- (!) 149 22 99 % -- --       Intake/Output Summary (Last 24 hours) at 4/28/2024 1306  Last data filed at 4/28/2024 0517  Gross per 24 hour   Intake 1018 ml   Output --   Net 1018 ml     No acute distress  Left upper extremity:  Severe bruising about the left clavicle  Skin " is intact, no evidence of impending open fracture  Neurovascularly intact distally          Data:   All laboratory data reviewed  Results for orders placed or performed during the hospital encounter of 04/27/24   Head CT w/o contrast     Status: None    Narrative    EXAM: CT HEAD W/O CONTRAST  LOCATION: [facility]  DATE: 4/27/2024    INDICATION: Fall, head injury.  COMPARISON: None.  TECHNIQUE: Routine CT Head without IV contrast. Multiplanar reformats. Dose reduction techniques were used.    FINDINGS:  No evidence of hemorrhage, mass, or hydrocephalus. Volume loss with background of nonspecific white matter hypoattenuation likely representing chronic small vessel ischemic change. No acute osseous abnormality.      Impression    IMPRESSION:  1.  No acute intracranial abnormality.   Chest XR,  PA & LAT     Status: None    Narrative    EXAM: XR CHEST 2 VIEWS  LOCATION: [facility]  DATE: 4/27/2024    INDICATION: fall  COMPARISON: None.      Impression    IMPRESSION: No acute cardiopulmonary abnormality. Comminuted fracture of the left distal clavicle, as seen on dedicated left shoulder radiographs. Left atrial appendage occlusion device.   XR Shoulder Left G/E 3 Views     Status: None    Narrative    EXAM: XR SHOULDER LEFT G/E 3 VIEWS  LOCATION: [facility]  DATE: 4/27/2024    INDICATION: Left shoulder pain after a fall.  COMPARISON: None.      Impression    IMPRESSION: Comminuted and mildly displaced left clavicle fracture, involving the lateral one third, extending to the acromioclavicular joint. Fracture lines are longitudinally oriented, extending over 6 cm in length. Fracture displacement measures up to   1.1 cm. The acromial clavicular joint remains normally aligned, although the fracture does extend into the joint. The glenohumeral joint is normal. Bones appear demineralized.   Troponin T, High Sensitivity     Status: Abnormal   Result Value Ref  Range    Troponin T, High Sensitivity 17 (H) <=14 ng/L   Comprehensive metabolic panel     Status: Abnormal   Result Value Ref Range    Sodium 140 135 - 145 mmol/L    Potassium 4.2 3.4 - 5.3 mmol/L    Carbon Dioxide (CO2) 15 (L) 22 - 29 mmol/L    Anion Gap 31 (H) 7 - 15 mmol/L    Urea Nitrogen 5.7 (L) 8.0 - 23.0 mg/dL    Creatinine 0.63 0.51 - 0.95 mg/dL    GFR Estimate >90 >60 mL/min/1.73m2    Calcium 10.3 (H) 8.8 - 10.2 mg/dL    Chloride 94 (L) 98 - 107 mmol/L    Glucose 116 (H) 70 - 99 mg/dL    Alkaline Phosphatase 106 40 - 150 U/L     (H) 0 - 45 U/L    ALT 96 (H) 0 - 50 U/L    Protein Total 8.1 6.4 - 8.3 g/dL    Albumin 4.6 3.5 - 5.2 g/dL    Bilirubin Total 0.6 <=1.2 mg/dL   Lipase     Status: Normal   Result Value Ref Range    Lipase 24 13 - 60 U/L   Alcohol level blood     Status: Normal   Result Value Ref Range    Alcohol ethyl <0.01 <=0.01 g/dL   UA with Microscopic reflex to Culture     Status: Abnormal    Specimen: Urine, Catheter   Result Value Ref Range    Color Urine Light Brown (A) Colorless, Straw, Light Yellow, Yellow    Appearance Urine Clear Clear    Glucose Urine Negative Negative mg/dL    Bilirubin Urine Negative Negative    Ketones Urine >150 (A) Negative mg/dL    Specific Gravity Urine 1.018 1.003 - 1.035    Blood Urine Small (A) Negative    pH Urine 5.5 5.0 - 7.0    Protein Albumin Urine 100 (A) Negative mg/dL    Urobilinogen Urine Normal Normal, 2.0 mg/dL    Nitrite Urine Negative Negative    Leukocyte Esterase Urine Negative Negative    Bacteria Urine Few (A) None Seen /HPF    Mucus Urine Present (A) None Seen /LPF    RBC Urine 1 <=2 /HPF    WBC Urine <1 <=5 /HPF    Squamous Epithelials Urine 1 <=1 /HPF    Hyaline Casts Urine 6 (H) <=2 /LPF    Narrative    Urine Culture not indicated   CBC with platelets and differential     Status: Abnormal   Result Value Ref Range    WBC Count 7.5 4.0 - 11.0 10e3/uL    RBC Count 4.13 3.80 - 5.20 10e6/uL    Hemoglobin 14.9 11.7 - 15.7 g/dL     Hematocrit 43.1 35.0 - 47.0 %     (H) 78 - 100 fL    MCH 36.1 (H) 26.5 - 33.0 pg    MCHC 34.6 31.5 - 36.5 g/dL    RDW 12.2 10.0 - 15.0 %    Platelet Count 193 150 - 450 10e3/uL    % Neutrophils 77 %    % Lymphocytes 13 %    % Monocytes 8 %    % Eosinophils 0 %    % Basophils 1 %    % Immature Granulocytes 1 %    NRBCs per 100 WBC 0 <1 /100    Absolute Neutrophils 5.8 1.6 - 8.3 10e3/uL    Absolute Lymphocytes 0.9 0.8 - 5.3 10e3/uL    Absolute Monocytes 0.6 0.0 - 1.3 10e3/uL    Absolute Eosinophils 0.0 0.0 - 0.7 10e3/uL    Absolute Basophils 0.1 0.0 - 0.2 10e3/uL    Absolute Immature Granulocytes 0.0 <=0.4 10e3/uL    Absolute NRBCs 0.0 10e3/uL   Extra Tube ([place] Draw)     Status: None    Narrative    The following orders were created for panel order Extra Tube ([place] Draw).  Procedure                               Abnormality         Status                     ---------                               -----------         ------                     Extra Blue Top Tube[[id number]]                              Final result               Extra Red Top Tube[[id number]]                               Final result                 Please view results for these tests on the individual orders.   Extra Blue Top Tube     Status: None   Result Value Ref Range    Hold Specimen JIC    Extra Red Top Tube     Status: None   Result Value Ref Range    Hold Specimen JIC    Magnesium     Status: Abnormal   Result Value Ref Range    Magnesium 1.4 (L) 1.7 - 2.3 mg/dL   Phosphorus     Status: Normal   Result Value Ref Range    Phosphorus 3.5 2.5 - 4.5 mg/dL   Ketone Beta-Hydroxybutyrate Quantitative     Status: Abnormal   Result Value Ref Range    Ketone (Beta-Hydroxybutyrate) Quantitative 8.80 (HH) <=0.30 mmol/L   iStat Gases (lactate) venous, POCT     Status: Abnormal   Result Value Ref Range    Lactic Acid POCT 2.2 (H) <=2.0 mmol/L    Bicarbonate Venous POCT 13 (L) 21 - 28 mmol/L    O2 Sat, Venous POCT 91 (H) 70 - 75 %    pCO2  Venous POCT 23 (L) 40 - 50 mm Hg    pH Venous POCT 7.35 7.32 - 7.43    pO2 Venous POCT 62 (H) 25 - 47 mm Hg   Magnesium     Status: Abnormal   Result Value Ref Range    Magnesium 1.5 (L) 1.7 - 2.3 mg/dL   Phosphorus     Status: Normal   Result Value Ref Range    Phosphorus 3.6 2.5 - 4.5 mg/dL   Basic metabolic panel     Status: Abnormal   Result Value Ref Range    Sodium 132 (L) 135 - 145 mmol/L    Potassium 4.3 3.4 - 5.3 mmol/L    Chloride 94 (L) 98 - 107 mmol/L    Carbon Dioxide (CO2) 16 (L) 22 - 29 mmol/L    Anion Gap 22 (H) 7 - 15 mmol/L    Urea Nitrogen 5.6 (L) 8.0 - 23.0 mg/dL    Creatinine 0.59 0.51 - 0.95 mg/dL    GFR Estimate >90 >60 mL/min/1.73m2    Calcium 9.2 8.8 - 10.2 mg/dL    Glucose 111 (H) 70 - 99 mg/dL   Lactic acid whole blood     Status: Normal   Result Value Ref Range    Lactic Acid 1.3 0.7 - 2.0 mmol/L   Comprehensive metabolic panel     Status: Abnormal   Result Value Ref Range    Sodium 137 135 - 145 mmol/L    Potassium 3.4 3.4 - 5.3 mmol/L    Carbon Dioxide (CO2) 22 22 - 29 mmol/L    Anion Gap 14 7 - 15 mmol/L    Urea Nitrogen 5.3 (L) 8.0 - 23.0 mg/dL    Creatinine 0.58 0.51 - 0.95 mg/dL    GFR Estimate >90 >60 mL/min/1.73m2    Calcium 9.0 8.8 - 10.2 mg/dL    Chloride 101 98 - 107 mmol/L    Glucose 147 (H) 70 - 99 mg/dL    Alkaline Phosphatase 75 40 - 150 U/L    AST 93 (H) 0 - 45 U/L    ALT 57 (H) 0 - 50 U/L    Protein Total 5.8 (L) 6.4 - 8.3 g/dL    Albumin 3.5 3.5 - 5.2 g/dL    Bilirubin Total 0.8 <=1.2 mg/dL   CBC with platelets     Status: Abnormal   Result Value Ref Range    WBC Count 3.3 (L) 4.0 - 11.0 10e3/uL    RBC Count 3.24 (L) 3.80 - 5.20 10e6/uL    Hemoglobin 12.0 11.7 - 15.7 g/dL    Hematocrit 34.1 (L) 35.0 - 47.0 %     (H) 78 - 100 fL    MCH 37.0 (H) 26.5 - 33.0 pg    MCHC 35.2 31.5 - 36.5 g/dL    RDW 12.2 10.0 - 15.0 %    Platelet Count 128 (L) 150 - 450 10e3/uL   Magnesium     Status: Normal   Result Value Ref Range    Magnesium 1.8 1.7 - 2.3 mg/dL   Phosphorus      Status: Abnormal   Result Value Ref Range    Phosphorus 1.5 (L) 2.5 - 4.5 mg/dL   EKG 12 lead     Status: None (Preliminary result)   Result Value Ref Range    Systolic Blood Pressure  mmHg    Diastolic Blood Pressure  mmHg    Ventricular Rate 139 BPM    Atrial Rate 139 BPM    [place] Interval 126 ms    QRS Duration 58 ms     ms    QTc 429 ms    P Axis 69 degrees    R AXIS -1 degrees    T Axis 114 degrees    Interpretation ECG       Sinus tachycardia with Premature supraventricular complexes  Possible Inferior infarct , age undetermined  Cannot rule out Anterior infarct , age undetermined  Marked ST abnormality, possible lateral subendocardial injury  Abnormal ECG  When compared with ECG of 07-MAY-2021 17:40,  Premature supraventricular complexes are now Present  Borderline criteria for Inferior infarct are now Present  Non-specific change in ST segment in Inferior leads     CBC + differential     Status: Abnormal    Narrative    The following orders were created for panel order CBC + differential.  Procedure                               Abnormality         Status                     ---------                               -----------         ------                     CBC with platelets and d...[[id number]]  Abnormal            Final result                 Please view results for these tests on the individual orders.              Alex Ace MD

## 2024-04-28 NOTE — PROVIDER NOTIFICATION
Page sent to Dr. Dominguez to request parameters on blood pressure meds as patient's blood pressure has been low this AM.

## 2024-04-28 NOTE — PLAN OF CARE
"Pt alert and oriented x 4. On RA. Refused pain meds, rating left shoulder pain 3/10. Up with 1 assist, gaitbelt and walker. Voiding adequately. IVF infusing. [place] 4 & 5  Problem: Adult Inpatient Plan of Care  Goal: Plan of Care Review  Description: The Plan of Care Review/Shift note should be completed every shift.  The Outcome Evaluation is a brief statement about your assessment that the patient is improving, declining, or no change.  This information will be displayed automatically on your shift  note.  Outcome: Progressing  Flowsheets (Taken 4/28/2024 0730)  Outcome Evaluation: Pt alert and oriented x 4. C/o left shoulder pain, refused pain meds. Voiding adequately. IVF infusing  Plan of Care Reviewed With: patient  Overall Patient Progress: improving  Goal: Patient-Specific Goal (Individualized)  Description: You can add care plan individualizations to a care plan. Examples of Individualization might be:  \"Parent requests to be called daily at 9am for status\", \"I have a hard time hearing out of my right ear\", or \"Do not touch me to wake me up as it startles  me\".  Outcome: Progressing  Goal: Absence of Hospital-Acquired Illness or Injury  Outcome: Progressing  Intervention: Identify and Manage Fall Risk  Recent Flowsheet Documentation  Taken 4/27/2024 2115 by Della Hernandez RN  Safety Promotion/Fall Prevention:   activity supervised   clutter free environment maintained   nonskid shoes/slippers when out of bed   patient and family education   room door open   room near nurse's station   safety round/check completed   supervised activity  Intervention: Prevent Skin Injury  Recent Flowsheet Documentation  Taken 4/27/2024 2115 by Della Hernandez RN  Body Position:   position maintained   supine, legs elevated  Intervention: Prevent and Manage VTE (Venous Thromboembolism) Risk  Recent Flowsheet Documentation  Taken 4/27/2024 2115 by Della Hernandez RN  VTE Prevention/Management: SCDs " (sequential compression devices) off  Intervention: Prevent Infection  Recent Flowsheet Documentation  Taken 4/27/2024 2115 by Della Hernandez, RN  Infection Prevention:   equipment surfaces disinfected   hand hygiene promoted  Goal: Optimal Comfort and Wellbeing  Outcome: Progressing  Intervention: Monitor Pain and Promote Comfort  Recent Flowsheet Documentation  Taken 4/27/2024 2218 by Della Hernandez, RN  Pain Management Interventions: medication offered but refused  Goal: Readiness for Transition of Care  Outcome: Progressing  Intervention: Mutually Develop Transition Plan  Recent Flowsheet Documentation  Taken 4/27/2024 2000 by Della Hernandez, RN  Equipment Currently Used at Home: cane, quad     Problem: Fall Injury Risk  Goal: Absence of Fall and Fall-Related Injury  Outcome: Progressing  Intervention: Identify and Manage Contributors  Recent Flowsheet Documentation  Taken 4/27/2024 2115 by Della Hernandez, RN  Medication Review/Management: medications reviewed  Intervention: Promote Injury-Free Environment  Recent Flowsheet Documentation  Taken 4/27/2024 2115 by Della Hernandez, RN  Safety Promotion/Fall Prevention:   activity supervised   clutter free environment maintained   nonskid shoes/slippers when out of bed   patient and family education   room door open   room near nurse's station   safety round/check completed   supervised activity     Problem: Comorbidity Management  Goal: Blood Pressure in Desired Range  Outcome: Progressing  Intervention: Maintain Blood Pressure Management  Recent Flowsheet Documentation  Taken 4/27/2024 2115 by Della Hernandez, RN  Medication Review/Management: medications reviewed   Goal Outcome Evaluation:      Plan of Care Reviewed With: patient    Overall Patient Progress: improvingOverall Patient Progress: improving    Outcome Evaluation: Pt alert and oriented x 4. C/o left shoulder pain, refused pain meds. Voiding adequately. IVF  infusing

## 2024-04-28 NOTE — PLAN OF CARE
Problem: Adult Inpatient Plan of Care  Goal: Readiness for Transition of Care  Intervention: Mutually Develop Transition Plan  Recent Flowsheet Documentation  Taken 4/27/2024 2000 by Della Hernandez, RN  Equipment Currently Used at Home: cane quad

## 2024-04-29 ENCOUNTER — APPOINTMENT (OUTPATIENT)
Dept: OCCUPATIONAL THERAPY | Facility: CLINIC | Age: 74
DRG: 563 | End: 2024-04-29
Payer: COMMERCIAL

## 2024-04-29 ENCOUNTER — APPOINTMENT (OUTPATIENT)
Dept: PHYSICAL THERAPY | Facility: CLINIC | Age: 74
DRG: 563 | End: 2024-04-29
Payer: COMMERCIAL

## 2024-04-29 LAB
ATRIAL RATE - MUSE: 139 BPM
DIASTOLIC BLOOD PRESSURE - MUSE: NORMAL MMHG
ERYTHROCYTE [DISTWIDTH] IN BLOOD BY AUTOMATED COUNT: 12.1 % (ref 10–15)
HCT VFR BLD AUTO: 32.9 % (ref 35–47)
HGB BLD-MCNC: 11.2 G/DL (ref 11.7–15.7)
INTERPRETATION ECG - MUSE: NORMAL
MAGNESIUM SERPL-MCNC: 1.5 MG/DL (ref 1.7–2.3)
MAGNESIUM SERPL-MCNC: 2.4 MG/DL (ref 1.7–2.3)
MCH RBC QN AUTO: 36.4 PG (ref 26.5–33)
MCHC RBC AUTO-ENTMCNC: 34 G/DL (ref 31.5–36.5)
MCV RBC AUTO: 107 FL (ref 78–100)
P AXIS - MUSE: 69 DEGREES
PHOSPHATE SERPL-MCNC: 3.1 MG/DL (ref 2.5–4.5)
PLATELET # BLD AUTO: 110 10E3/UL (ref 150–450)
POTASSIUM SERPL-SCNC: 3.5 MMOL/L (ref 3.4–5.3)
PR INTERVAL - MUSE: 126 MS
QRS DURATION - MUSE: 58 MS
QT - MUSE: 282 MS
QTC - MUSE: 429 MS
R AXIS - MUSE: -1 DEGREES
RBC # BLD AUTO: 3.08 10E6/UL (ref 3.8–5.2)
SYSTOLIC BLOOD PRESSURE - MUSE: NORMAL MMHG
T AXIS - MUSE: 114 DEGREES
VENTRICULAR RATE- MUSE: 139 BPM
WBC # BLD AUTO: 3.6 10E3/UL (ref 4–11)

## 2024-04-29 PROCEDURE — 84100 ASSAY OF PHOSPHORUS: CPT | Performed by: INTERNAL MEDICINE

## 2024-04-29 PROCEDURE — 97530 THERAPEUTIC ACTIVITIES: CPT | Mod: GP

## 2024-04-29 PROCEDURE — 97116 GAIT TRAINING THERAPY: CPT | Mod: GP

## 2024-04-29 PROCEDURE — 83735 ASSAY OF MAGNESIUM: CPT | Performed by: STUDENT IN AN ORGANIZED HEALTH CARE EDUCATION/TRAINING PROGRAM

## 2024-04-29 PROCEDURE — 250N000013 HC RX MED GY IP 250 OP 250 PS 637: Performed by: STUDENT IN AN ORGANIZED HEALTH CARE EDUCATION/TRAINING PROGRAM

## 2024-04-29 PROCEDURE — 84132 ASSAY OF SERUM POTASSIUM: CPT | Performed by: INTERNAL MEDICINE

## 2024-04-29 PROCEDURE — 250N000013 HC RX MED GY IP 250 OP 250 PS 637: Performed by: INTERNAL MEDICINE

## 2024-04-29 PROCEDURE — 97535 SELF CARE MNGMENT TRAINING: CPT | Mod: GO

## 2024-04-29 PROCEDURE — 36415 COLL VENOUS BLD VENIPUNCTURE: CPT | Performed by: INTERNAL MEDICINE

## 2024-04-29 PROCEDURE — 250N000011 HC RX IP 250 OP 636: Performed by: STUDENT IN AN ORGANIZED HEALTH CARE EDUCATION/TRAINING PROGRAM

## 2024-04-29 PROCEDURE — 85027 COMPLETE CBC AUTOMATED: CPT | Performed by: INTERNAL MEDICINE

## 2024-04-29 PROCEDURE — 36415 COLL VENOUS BLD VENIPUNCTURE: CPT | Performed by: STUDENT IN AN ORGANIZED HEALTH CARE EDUCATION/TRAINING PROGRAM

## 2024-04-29 PROCEDURE — 120N000001 HC R&B MED SURG/OB

## 2024-04-29 PROCEDURE — 99231 SBSQ HOSP IP/OBS SF/LOW 25: CPT | Performed by: STUDENT IN AN ORGANIZED HEALTH CARE EDUCATION/TRAINING PROGRAM

## 2024-04-29 PROCEDURE — 83735 ASSAY OF MAGNESIUM: CPT | Performed by: INTERNAL MEDICINE

## 2024-04-29 RX ORDER — MAGNESIUM SULFATE HEPTAHYDRATE 40 MG/ML
4 INJECTION, SOLUTION INTRAVENOUS ONCE
Status: COMPLETED | OUTPATIENT
Start: 2024-04-29 | End: 2024-04-29

## 2024-04-29 RX ORDER — POTASSIUM CHLORIDE 1500 MG/1
20 TABLET, EXTENDED RELEASE ORAL ONCE
Status: COMPLETED | OUTPATIENT
Start: 2024-04-29 | End: 2024-04-29

## 2024-04-29 RX ADMIN — THIAMINE HCL TAB 100 MG 100 MG: 100 TAB at 10:03

## 2024-04-29 RX ADMIN — ACETAMINOPHEN 650 MG: 325 TABLET, FILM COATED ORAL at 21:23

## 2024-04-29 RX ADMIN — FOLIC ACID 1 MG: 1 TABLET ORAL at 10:03

## 2024-04-29 RX ADMIN — ACETAMINOPHEN 650 MG: 325 TABLET, FILM COATED ORAL at 04:03

## 2024-04-29 RX ADMIN — ACETAMINOPHEN 650 MG: 325 TABLET, FILM COATED ORAL at 12:58

## 2024-04-29 RX ADMIN — Medication 1 TABLET: at 10:03

## 2024-04-29 RX ADMIN — GABAPENTIN 900 MG: 300 CAPSULE ORAL at 17:53

## 2024-04-29 RX ADMIN — POTASSIUM & SODIUM PHOSPHATES POWDER PACK 280-160-250 MG 1 PACKET: 280-160-250 PACK at 01:27

## 2024-04-29 RX ADMIN — DILTIAZEM HYDROCHLORIDE 120 MG: 120 CAPSULE, COATED, EXTENDED RELEASE ORAL at 10:03

## 2024-04-29 RX ADMIN — POTASSIUM CHLORIDE 20 MEQ: 1500 TABLET, EXTENDED RELEASE ORAL at 10:03

## 2024-04-29 RX ADMIN — MAGNESIUM SULFATE HEPTAHYDRATE 4 G: 4 INJECTION, SOLUTION INTRAVENOUS at 12:44

## 2024-04-29 RX ADMIN — SODIUM CHLORIDE, SODIUM LACTATE, POTASSIUM CHLORIDE, CALCIUM CHLORIDE, AND DEXTROSE MONOHYDRATE: 600; 310; 30; 20; 5 INJECTION, SOLUTION INTRAVENOUS at 02:30

## 2024-04-29 RX ADMIN — Medication 5 MG: at 21:22

## 2024-04-29 RX ADMIN — GABAPENTIN 900 MG: 300 CAPSULE ORAL at 01:27

## 2024-04-29 RX ADMIN — GABAPENTIN 900 MG: 300 CAPSULE ORAL at 10:03

## 2024-04-29 RX ADMIN — SODIUM CHLORIDE, SODIUM LACTATE, POTASSIUM CHLORIDE, CALCIUM CHLORIDE, AND DEXTROSE MONOHYDRATE: 600; 310; 30; 20; 5 INJECTION, SOLUTION INTRAVENOUS at 15:03

## 2024-04-29 RX ADMIN — METOPROLOL SUCCINATE 50 MG: 50 TABLET, EXTENDED RELEASE ORAL at 10:03

## 2024-04-29 ASSESSMENT — ACTIVITIES OF DAILY LIVING (ADL)
ADLS_ACUITY_SCORE: 39
ADLS_ACUITY_SCORE: 39
ADLS_ACUITY_SCORE: 40
ADLS_ACUITY_SCORE: 39
ADLS_ACUITY_SCORE: 38
ADLS_ACUITY_SCORE: 39
ADLS_ACUITY_SCORE: 36
ADLS_ACUITY_SCORE: 38
ADLS_ACUITY_SCORE: 39
ADLS_ACUITY_SCORE: 40
ADLS_ACUITY_SCORE: 39
ADLS_ACUITY_SCORE: 38
ADLS_ACUITY_SCORE: 39
ADLS_ACUITY_SCORE: 38
ADLS_ACUITY_SCORE: 40
ADLS_ACUITY_SCORE: 36

## 2024-04-29 NOTE — PLAN OF CARE
"  Problem: Adult Inpatient Plan of Care  Goal: Plan of Care Review  Description: The Plan of Care Review/Shift note should be completed every shift.  The Outcome Evaluation is a brief statement about your assessment that the patient is improving, declining, or no change.  This information will be displayed automatically on your shift  note.  Outcome: Progressing  Flowsheets (Taken 4/29/2024 0801)  Plan of Care Reviewed With: patient  Overall Patient Progress: improving  Goal: Patient-Specific Goal (Individualized)  Description: You can add care plan individualizations to a care plan. Examples of Individualization might be:  \"Parent requests to be called daily at 9am for status\", \"I have a hard time hearing out of my right ear\", or \"Do not touch me to wake me up as it startles  me\".  Outcome: Progressing  Goal: Absence of Hospital-Acquired Illness or Injury  Outcome: Progressing  Intervention: Identify and Manage Fall Risk  Recent Flowsheet Documentation  Taken 4/28/2024 2008 by Della Hernandez, RN  Safety Promotion/Fall Prevention:   activity supervised   assistive device/personal items within reach   clutter free environment maintained   increased rounding and observation   increase visualization of patient   nonskid shoes/slippers when out of bed   patient and family education   room door open   safety round/check completed  Intervention: Prevent Skin Injury  Recent Flowsheet Documentation  Taken 4/28/2024 2008 by Della Hernandez, RN  Body Position: supine, head elevated  Goal: Optimal Comfort and Wellbeing  Outcome: Progressing  Intervention: Monitor Pain and Promote Comfort  Recent Flowsheet Documentation  Taken 4/28/2024 2008 by Della Hernandez, RN  Pain Management Interventions: medication offered but refused  Goal: Readiness for Transition of Care  Outcome: Progressing     Problem: Fall Injury Risk  Goal: Absence of Fall and Fall-Related Injury  Outcome: Progressing  Intervention: Identify " and Manage Contributors  Recent Flowsheet Documentation  Taken 4/28/2024 2008 by Della Hernandez, RN  Medication Review/Management: medications reviewed  Intervention: Promote Injury-Free Environment  Recent Flowsheet Documentation  Taken 4/28/2024 2008 by Della Hernandez, RN  Safety Promotion/Fall Prevention:   activity supervised   assistive device/personal items within reach   clutter free environment maintained   increased rounding and observation   increase visualization of patient   nonskid shoes/slippers when out of bed   patient and family education   room door open   safety round/check completed     Problem: Comorbidity Management  Goal: Blood Pressure in Desired Range  Outcome: Progressing  Intervention: Maintain Blood Pressure Management  Recent Flowsheet Documentation  Taken 4/28/2024 2008 by Della Hernandez, RN  Medication Review/Management: medications reviewed     Problem: Pain Acute  Goal: Optimal Pain Control and Function  Outcome: Progressing  Intervention: Develop Pain Management Plan  Recent Flowsheet Documentation  Taken 4/28/2024 2008 by Della Hernandez, RN  Pain Management Interventions: medication offered but refused  Intervention: Prevent or Manage Pain  Recent Flowsheet Documentation  Taken 4/28/2024 2008 by Della Hernandez, RN  Medication Review/Management: medications reviewed   Goal Outcome Evaluation:      Plan of Care Reviewed With: patient    Overall Patient Progress: improvingOverall Patient Progress: improving     Alert and oriented x 4. On RA. IVF infusing. C/o left shoulder pain with activity. Sling on when ambulating. Voiding adequately, incontinent at times.

## 2024-04-29 NOTE — PROGRESS NOTES
[facility] Medical Service Progress Note  Patient: Rizwana Aguilar  MRN: [id number]  Admission Date: 4/27/2024      Assessment & Plan      Rizwana Aguilar is a 73 year old female with PMH significant for paroxysymal afib not on anticoagulation, HTN, alcohol use disorder, alcoholic liver disease, pancreatitis, breast cancer, memory loss, erosive gastropathy admitted on 4/27/2024 after fall with left shoulder pain.      AGMA, now resolved with IV fluids  Mild lactic acidosis, due to EtOH  Alcoholic vs starvation ketosis:  Presents after fall.  Tripped over stair.  Patient endorses nausea and vomiting.  Has not been able to keep even water down.  This has persisted for multiple days.  In addition, patient has been drinking vodka.  She reports 2 drinks daily but this is likely severely under estimated.  AG 31, HCO3 15.  Ketones >8.  Lactic acid 2.2.  Most likely combination alcoholic and starvation ketosis.   Received IV fluid boluses and was maintained on LR at 100 Elemo per hour.  Diet advanced to regular diet and tolerated.  Tachycardia, unspecified, resolved.    History of atrial fibrillation s/p watchman device:  HR 150s on arrival.  Improved to 130s after 2L IV fluids.  This is likely multifactorial in the setting of dehydration, poor oral intake, electrolyte disturbance, acidosis.  In addition, the patient has a history of paroxysmal atrial fibrillation and has not taken her rate control medications for the past couple of days.  EKG on arrival appears consistent with sinus tachycardia, although rate is fast and so it is difficulty to say for sure.  Consideration for afib RVR vs aflutter.  Blood pressure stable.   -Start PTA metoprolol, diltiazem  -Aggressive IVF resuscitation as above  -K goal >4.  Mag goal >2.  -Treat alcohol withdrawal, ketoacidosis       Mechanical fall complicated  displaced left clavicular fracture:  Pt reports tripping and falling on stairs.  Denies LOC or trauma.  Hurt her left shoulder.  XR  imaging shows mildly displaced left clavical fracture.  Exam shows bruising around the area.   reports that she was at nursing home approximately 1 year ago.  She was doing quite well while they are with physical therapy.  She then discharged herself and returned home.  She since then has had significant decline per her .  -Continue sling started in the ER  -Will consult ortho  -Anticipate non-op management so will not keep NPO  -PT/OT eval     Transaminitis  Alcohol use disorder:  Patient reports 2 watered down vodka drinks daily.  Suspect she severely underplays her use.   reports a minimum of 4 drinks a night.  Generally 2 vodka drinks, 2 large pours of alcohol, and this may be followed by a glass of daniel before bed.  This has complicated family matters for 5 years now per .  ETOH negative on arrival.  , ALT 96.  Fairly consistent with alcohol related injury pattern.  Patient tremulous.  High risk of alcohol withdrawal  -CIWA protocol with ativan and gabapentin.   -Thiamine & folate  -Offer CD/SW when clinically improved  -Repeat CMP in AM     Hypertension:  Blood pressure 170s systolic on arrival.  Patient reports she has not been able to keep down her BP medications over the past couple of days.   -Restart PTA metoprolol  -Restart other blood pressure medications once med rec is complete - patient unable to tell me her meds     Hypomagnesemia: Likely related to poor PO intake.  Replacement protocol ordered.     Breast cancer: s/p lumpectomy: Follows with oncology at [facility]. Decided against oncotype testing and chemo and adjuvant hormonal therapy.     Memory loss: Per chart review this is thought related to alcohol use.  Complicates cares.  At risk of delirium.      Urinary incontinence:  Chronic problem for her.   Fecal incontinence/urgency: Improving slightly. Possibly related to electrolyte replacement. Monitor.     Erosive gastropathy: Pantoprazole in place of  PTA omeprazole  Dizziness: I believe she is prescribed meclizine  MDD:    Thrombocytopenia.  Platelet count decreased from 180 down to 128.  She was receiving Lovenox 40 mg subcu daily.  Suspect platelet count dropped in the setting of EtOH use disorder versus heparin-induced thrombocytopenia, which I think is unlikely the etiology at this moment..  No any bleeding diathesis.  Will check CBC in a.m. tomorrow.  Lovenox 40 mg subcu is put on hold.     Disposition: TCU     Diet: Regular Diet Adult    DVT Prophylaxis: Enoxaparin (Lovenox) SQ  Billings Catheter: Not present  Lines: None     Cardiac Monitoring: None  Code Status:  FULL  Isidoro Dominguez MD  Internal Medicine Staff Hospitalist   [facility]   Pager: [phone]  Page Cross Cover after 5 pm: pager [phone]   ___________________________________________________________________    Subjective & Interval Hx:    Nursing notes reviewed. Had two episodes of fecal incontinence over last several days with normal formed stools in between. No other acute issues.  and patient now amenable to TCU.    Medications: Reviewed in EPIC. List below for reference    Current Facility-Administered Medications:     acetaminophen (TYLENOL) tablet 650 mg, 650 mg, Oral, Q4H PRN, 650 mg at 04/29/24 1258 **OR** acetaminophen (TYLENOL) Suppository 650 mg, 650 mg, Rectal, Q4H PRN, Luke Yen DO    calcium carbonate (TUMS) chewable tablet 1,000 mg, 1,000 mg, Oral, 4x Daily PRN, Luke Yen DO    dextrose 5% in lactated ringers infusion, , Intravenous, Continuous, Luke Yen DO, Last Rate: 100 mL/hr at 04/29/24 1006, Rate Verify at 04/29/24 1006    diltiazem ER COATED BEADS (CARDIZEM CD/CARTIA XT) 24 hr capsule 120 mg, 120 mg, Oral, Daily, Isidoro Dominguez MD, 120 mg at 04/29/24 1003    [Held by provider] enoxaparin ANTICOAGULANT (LOVENOX) injection 40 mg, 40 mg, Subcutaneous, Q24H, Luke Yen DO, 40 mg at 04/27/24 2132    flumazenil (ROMAZICON)  injection 0.2 mg, 0.2 mg, Intravenous, q1 min prn, Luke Yen DO    folic acid (FOLVITE) tablet 1 mg, 1 mg, Oral, Daily, Luke Yen DO, 1 mg at 04/29/24 1003    [START ON 5/5/2024] gabapentin (NEURONTIN) capsule 100 mg, 100 mg, Oral, Q8H, Luke Yen,     [START ON 5/3/2024] gabapentin (NEURONTIN) capsule 300 mg, 300 mg, Oral, Q8H, Luke Yen,     [START ON 5/1/2024] gabapentin (NEURONTIN) capsule 600 mg, 600 mg, Oral, Q8H, Luke Yen,     gabapentin (NEURONTIN) capsule 900 mg, 900 mg, Oral, Q8H, Luke Yen DO, 900 mg at 04/29/24 1003    OLANZapine zydis (zyPREXA) ODT tab 5-10 mg, 5-10 mg, Oral, Q6H PRN **OR** haloperidol lactate (HALDOL) injection 2.5-5 mg, 2.5-5 mg, Intravenous, Q6H PRN, Luke Yen DO    lidocaine (LMX4) cream, , Topical, Q1H PRN, Luke Yen DO    lidocaine 1 % 0.1-1 mL, 0.1-1 mL, Other, Q1H PRN, Luke Yen DO    LORazepam (ATIVAN) tablet 1-2 mg, 1-2 mg, Oral, Q30 Min PRN **OR** LORazepam (ATIVAN) injection 1-2 mg, 1-2 mg, Intravenous, Q30 Min PRN, Luke Yen DO    magnesium sulfate 4 g in 100 mL sterile water intermittent infusion, 4 g, Intravenous, Once, Luke Yen DO, Last Rate: 50 mL/hr at 04/29/24 1244, 4 g at 04/29/24 1244    melatonin tablet 5 mg, 5 mg, Oral, QPM PRN, Luke Yen DO, 5 mg at 04/28/24 2140    metoprolol succinate ER (TOPROL XL) 24 hr tablet 50 mg, 50 mg, Oral, Daily, Isidoro Dominguez MD, 50 mg at 04/29/24 1003    multivitamin w/minerals (THERA-VIT-M) tablet 1 tablet, 1 tablet, Oral, Daily, Luke Yen DO, 1 tablet at 04/29/24 1003    ondansetron (ZOFRAN ODT) ODT tab 4 mg, 4 mg, Oral, Q6H PRN **OR** ondansetron (ZOFRAN) injection 4 mg, 4 mg, Intravenous, Q6H PRN, Luke Yen DO    prochlorperazine (COMPAZINE) injection 5 mg, 5 mg, Intravenous, Q6H PRN **OR** prochlorperazine (COMPAZINE) tablet 5 mg, 5 mg, Oral, Q6H PRN **OR** prochlorperazine (COMPAZINE) suppository 12.5 mg, 12.5 mg, Rectal,  "Q12H PRN, Baxa, Luke, DO    senna-docusate (SENOKOT-S/PERICOLACE) 8.6-50 MG per tablet 1 tablet, 1 tablet, Oral, BID PRN **OR** senna-docusate (SENOKOT-S/PERICOLACE) 8.6-50 MG per tablet 2 tablet, 2 tablet, Oral, BID PRN, Baxa, Luke, DO    sodium chloride (PF) 0.9% PF flush 3 mL, 3 mL, Intracatheter, Q8H, Baxa, Luke, DO, 3 mL at 04/29/24 1244    sodium chloride (PF) 0.9% PF flush 3 mL, 3 mL, Intracatheter, q1 min prn, Baxa, Luke, DO    thiamine (B-1) tablet 100 mg, 100 mg, Oral, Daily, Baxa, Luke, DO, 100 mg at 04/29/24 1003    Physical Exam:    Temp: 97.6  F (36.4  C) Temp src: Temporal BP: (!) 158/89 Pulse: 78   Resp: 18 SpO2: (!) 76 % O2 Device: None (Room air)   Height: 167.6 cm (5' 6\") Weight: 63.5 kg (140 lb)  Estimated body mass index is 22.6 kg/m  as calculated from the following:    Height as of this encounter: 1.676 m (5' 6\").    Weight as of this encounter: 63.5 kg (140 lb).    General: Very pleasant female resting comfortably in hospital bed.  Awake, alert, interactive.  at bedside.  HEENT: Normocephalic, atraumatic.  PERRL, EOMI.  Conjunctiva clear, sclerae anicteric.  Mucous membranes moist.  Respiratory: Normal work of breathing.    Musculoskeletal: Moving all extremities appropriately.  Skin: No rashes or abrasions on exposed skin.  Neurologic: Alert and oriented x4.  Cranial nerves II through XII grossly intact.  Psychologic: Appropriate mood and affect.      Labs & Studies of Note: I personally reviewed the following studies:  CBC RESULTS:   Recent Labs   Lab Test 04/28/24  0732   WBC 3.3*   RBC 3.24*   HGB 12.0   HCT 34.1*   *   MCH 37.0*   MCHC 35.2   RDW 12.2   *     Last Comprehensive Metabolic Panel:  Lab Results   Component Value Date     04/28/2024    POTASSIUM 3.5 04/29/2024    CHLORIDE 101 04/28/2024    CO2 22 04/28/2024    ANIONGAP 14 04/28/2024     (H) 04/28/2024    BUN 5.3 (L) 04/28/2024    CR 0.58 04/28/2024    GFRESTIMATED >90 " 04/28/2024    KHUSHI 9.0 04/28/2024

## 2024-04-29 NOTE — CONSULTS
Care Management Initial Consult    General Information  Assessment completed with: Patient,         Primary Care Provider verified and updated as needed:     Readmission within the last 30 days:        Reason for Consult: discharge planning  Advance Care Planning: Advance Care Planning Reviewed: present on chart          Communication Assessment  Patient's communication style: spoken language (English or Bilingual)    Hearing Difficulty or Deaf: no   Wear Glasses or Blind: yes    Cognitive  Cognitive/Neuro/Behavioral: unchanged from my previous assessment                      Living Environment:   People in home: spouse     Current living Arrangements: house      Able to return to prior arrangements: yes       Family/Social Support:  Care provided by: self, spouse/significant other  Provides care for: no one                Description of Support System:           Current Resources:   Patient receiving home care services:       Community Resources:    Equipment currently used at home: cane, quad  Supplies currently used at home:      Employment/Financial:  Employment Status:          Financial Concerns:             Does the patient's insurance plan have a 3 day qualifying hospital stay waiver?  No    Lifestyle & Psychosocial Needs:  Social Determinants of Health     Food Insecurity: Not At Risk (7/6/2023)    Received from [facility]    Food Insecurity     Does your food run out before you have the money to buy more?: No   Depression: At risk (5/24/2023)    Received from [facility]    PHQ-2     PHQ-2 Score: 4   Housing Stability: Not on file   Tobacco Use: Medium Risk (7/12/2023)    Received from [facility]    Patient History     Smoking Tobacco Use: Former     Smokeless Tobacco Use: Never     Passive Exposure: Not on file   Financial Resource Strain: Not At Risk (7/6/2023)    Received from [facility]    Financial Resource Strain     Is it hard for you to pay for the very basics like food, housing,  medical care or heating?: No   Alcohol Use: Alcohol Misuse (8/4/2021)    Received from [employer]    AUDIT-C     Frequency of Alcohol Consumption: 4 or more times a week     Average Number of Drinks: 3 or 4     Frequency of Binge Drinking: Patient declined   Transportation Needs: At Risk (7/6/2023)    Received from [employer]    Transportation Needs     Does a lack of transportation keep you from your medical appointments or from getting your medications?: Yes   Physical Activity: Not on file   Interpersonal Safety: Not on file   Stress: Not on file   Social Connections: Not on file   Health Literacy: Not on file       Functional Status:  Prior to admission patient needed assistance:   Dependent ADLs:: Independent, Ambulation-cane          Mental Health Status:          Chemical Dependency Status:                Values/Beliefs:  Spiritual, Cultural Beliefs, Restoration Practices, Values that affect care:                 Additional Information:  Jeanmarie consulted for discharge planning. Sw met with pt at bedside.  Pt was pleasant and engaged. She lives as home with spouse and is generally independent with cares other than using a cane. Pt was agreeable to having TCU referrals sent. SW will send referrals to selected TCU's.    Social work will continue to follow and assist with discharge planning as needed.    DARON Martinez, [facility]  Inpatient Care Coordination  [facility]         DARON Martinez

## 2024-04-29 NOTE — PROGRESS NOTES
Care Management Follow Up    Length of Stay (days): 2    Expected Discharge Date: 04/30/2024     Concerns to be Addressed:       Patient plan of care discussed at interdisciplinary rounds: Yes    Anticipated Discharge Disposition: Transitional Care     Anticipated Discharge Services: None  Anticipated Discharge DME: None    Patient/family educated on Medicare website which has current facility and service quality ratings: yes  Education Provided on the Discharge Plan: Yes  Patient/Family in Agreement with the Plan: yes    Referrals Placed by CM/SW: Post Acute Facilities  Private pay costs discussed: Not applicable    Additional Information:    SW informed that pt would like SW to call her spouse. SW met with pt at the bedside who explains that she would like SW to call her spouse about discharge planning. She is agreeable to whichever disposition he feels is best and is open to his suggestions though she would prefer to return home.     Spoke with pt spouse over the phone who is in agreement with TCU. He would prefer pt to stay nearest to their home with facilities with higher ratings. Explained ETOH as a barrier in which pt spouse is understanding. Referrals to [facility] and [facility] remain pending. Pt spouse aware that additional options will need to likely be obtained.     Samia Watkins/DARON Joshi, LGSW  Inpatient Care Coordination  Emergency Room /Samia Levy, LGSW

## 2024-04-29 NOTE — PLAN OF CARE
"A&Ox4, CIWAs 0, appetite improving today. K and Mg replaced, fluids infusing. Remote tele in place. Pt up in chair, ambulating Ax1 with walker and GB. Incontinent stool x2. Sling on L arm, NWB to LUE. Pain managed with ice and PRN tylenol. Plan is discharge to TCU.     Problem: Adult Inpatient Plan of Care  Goal: Plan of Care Review  Description: The Plan of Care Review/Shift note should be completed every shift.  The Outcome Evaluation is a brief statement about your assessment that the patient is improving, declining, or no change.  This information will be displayed automatically on your shift  note.  Outcome: Progressing  Flowsheets (Taken 4/29/2024 1610)  Plan of Care Reviewed With:   patient   spouse  Overall Patient Progress: improving  Goal: Patient-Specific Goal (Individualized)  Description: You can add care plan individualizations to a care plan. Examples of Individualization might be:  \"Parent requests to be called daily at 9am for status\", \"I have a hard time hearing out of my right ear\", or \"Do not touch me to wake me up as it startles  me\".  Outcome: Progressing  Goal: Absence of Hospital-Acquired Illness or Injury  Outcome: Progressing  Intervention: Identify and Manage Fall Risk  Recent Flowsheet Documentation  Taken 4/29/2024 0800 by Sil Roque RN  Safety Promotion/Fall Prevention:   activity supervised   assistive device/personal items within reach   clutter free environment maintained   increased rounding and observation   increase visualization of patient   nonskid shoes/slippers when out of bed   patient and family education   room door open   safety round/check completed  Intervention: Prevent Skin Injury  Recent Flowsheet Documentation  Taken 4/29/2024 1043 by Sil Roque RN  Body Position: supine, head elevated  Taken 4/29/2024 0800 by Sil Roque RN  Body Position: supine, head elevated  Intervention: Prevent and Manage VTE (Venous Thromboembolism) Risk  Recent Flowsheet " Documentation  Taken 4/29/2024 0800 by Sil Roque RN  VTE Prevention/Management: SCDs (sequential compression devices) on  Goal: Optimal Comfort and Wellbeing  Outcome: Progressing  Intervention: Monitor Pain and Promote Comfort  Recent Flowsheet Documentation  Taken 4/29/2024 1043 by Sil Roque RN  Pain Management Interventions: cold applied  Goal: Readiness for Transition of Care  Outcome: Progressing     Problem: Fall Injury Risk  Goal: Absence of Fall and Fall-Related Injury  Outcome: Progressing  Intervention: Identify and Manage Contributors  Recent Flowsheet Documentation  Taken 4/29/2024 0800 by Sil Roque RN  Medication Review/Management: medications reviewed  Intervention: Promote Injury-Free Environment  Recent Flowsheet Documentation  Taken 4/29/2024 0800 by Sil Roque RN  Safety Promotion/Fall Prevention:   activity supervised   assistive device/personal items within reach   clutter free environment maintained   increased rounding and observation   increase visualization of patient   nonskid shoes/slippers when out of bed   patient and family education   room door open   safety round/check completed     Problem: Comorbidity Management  Goal: Blood Pressure in Desired Range  Outcome: Progressing  Intervention: Maintain Blood Pressure Management  Recent Flowsheet Documentation  Taken 4/29/2024 0800 by Sil Roque RN  Medication Review/Management: medications reviewed     Problem: Pain Acute  Goal: Optimal Pain Control and Function  Outcome: Progressing  Intervention: Develop Pain Management Plan  Recent Flowsheet Documentation  Taken 4/29/2024 1043 by Sil Roque RN  Pain Management Interventions: cold applied  Intervention: Prevent or Manage Pain  Recent Flowsheet Documentation  Taken 4/29/2024 0800 by Sil Roque RN  Medication Review/Management: medications reviewed  Intervention: Optimize Psychosocial Wellbeing  Recent Flowsheet Documentation  Taken 4/29/2024  0800 by Sil Roque, RN  Supportive Measures:   active listening utilized   self-care encouraged   Goal Outcome Evaluation:      Plan of Care Reviewed With: patient, spouse    Overall Patient Progress: improvingOverall Patient Progress: improving

## 2024-04-30 ENCOUNTER — APPOINTMENT (OUTPATIENT)
Dept: PHYSICAL THERAPY | Facility: CLINIC | Age: 74
DRG: 563 | End: 2024-04-30
Payer: COMMERCIAL

## 2024-04-30 ENCOUNTER — APPOINTMENT (OUTPATIENT)
Dept: OCCUPATIONAL THERAPY | Facility: CLINIC | Age: 74
DRG: 563 | End: 2024-04-30
Payer: COMMERCIAL

## 2024-04-30 LAB
CREAT SERPL-MCNC: 0.49 MG/DL (ref 0.51–0.95)
EGFRCR SERPLBLD CKD-EPI 2021: >90 ML/MIN/1.73M2
MAGNESIUM SERPL-MCNC: 1.7 MG/DL (ref 1.7–2.3)
PHOSPHATE SERPL-MCNC: 2.9 MG/DL (ref 2.5–4.5)
PLATELET # BLD AUTO: 123 10E3/UL (ref 150–450)
POTASSIUM SERPL-SCNC: 3.6 MMOL/L (ref 3.4–5.3)

## 2024-04-30 PROCEDURE — 83735 ASSAY OF MAGNESIUM: CPT | Performed by: STUDENT IN AN ORGANIZED HEALTH CARE EDUCATION/TRAINING PROGRAM

## 2024-04-30 PROCEDURE — 250N000013 HC RX MED GY IP 250 OP 250 PS 637: Performed by: INTERNAL MEDICINE

## 2024-04-30 PROCEDURE — 84100 ASSAY OF PHOSPHORUS: CPT | Performed by: STUDENT IN AN ORGANIZED HEALTH CARE EDUCATION/TRAINING PROGRAM

## 2024-04-30 PROCEDURE — 999N000127 HC STATISTIC PERIPHERAL IV START W US GUIDANCE

## 2024-04-30 PROCEDURE — 250N000013 HC RX MED GY IP 250 OP 250 PS 637: Performed by: STUDENT IN AN ORGANIZED HEALTH CARE EDUCATION/TRAINING PROGRAM

## 2024-04-30 PROCEDURE — 85049 AUTOMATED PLATELET COUNT: CPT | Performed by: STUDENT IN AN ORGANIZED HEALTH CARE EDUCATION/TRAINING PROGRAM

## 2024-04-30 PROCEDURE — 97530 THERAPEUTIC ACTIVITIES: CPT | Mod: GP

## 2024-04-30 PROCEDURE — 36415 COLL VENOUS BLD VENIPUNCTURE: CPT | Performed by: STUDENT IN AN ORGANIZED HEALTH CARE EDUCATION/TRAINING PROGRAM

## 2024-04-30 PROCEDURE — 97535 SELF CARE MNGMENT TRAINING: CPT | Mod: GO

## 2024-04-30 PROCEDURE — 97116 GAIT TRAINING THERAPY: CPT | Mod: GP

## 2024-04-30 PROCEDURE — 82565 ASSAY OF CREATININE: CPT | Performed by: STUDENT IN AN ORGANIZED HEALTH CARE EDUCATION/TRAINING PROGRAM

## 2024-04-30 PROCEDURE — 250N000011 HC RX IP 250 OP 636: Performed by: STUDENT IN AN ORGANIZED HEALTH CARE EDUCATION/TRAINING PROGRAM

## 2024-04-30 PROCEDURE — 120N000001 HC R&B MED SURG/OB

## 2024-04-30 PROCEDURE — 84132 ASSAY OF SERUM POTASSIUM: CPT | Performed by: STUDENT IN AN ORGANIZED HEALTH CARE EDUCATION/TRAINING PROGRAM

## 2024-04-30 PROCEDURE — 99231 SBSQ HOSP IP/OBS SF/LOW 25: CPT | Performed by: STUDENT IN AN ORGANIZED HEALTH CARE EDUCATION/TRAINING PROGRAM

## 2024-04-30 RX ORDER — POTASSIUM CHLORIDE 1500 MG/1
20 TABLET, EXTENDED RELEASE ORAL ONCE
Status: COMPLETED | OUTPATIENT
Start: 2024-04-30 | End: 2024-04-30

## 2024-04-30 RX ORDER — MAGNESIUM OXIDE 400 MG/1
400 TABLET ORAL EVERY 4 HOURS
Status: COMPLETED | OUTPATIENT
Start: 2024-04-30 | End: 2024-04-30

## 2024-04-30 RX ADMIN — POTASSIUM CHLORIDE 20 MEQ: 1500 TABLET, EXTENDED RELEASE ORAL at 09:21

## 2024-04-30 RX ADMIN — Medication 1 TABLET: at 09:21

## 2024-04-30 RX ADMIN — THIAMINE HCL TAB 100 MG 100 MG: 100 TAB at 09:22

## 2024-04-30 RX ADMIN — MAGNESIUM OXIDE TAB 400 MG (241.3 MG ELEMENTAL MG) 400 MG: 400 (241.3 MG) TAB at 14:32

## 2024-04-30 RX ADMIN — FOLIC ACID 1 MG: 1 TABLET ORAL at 09:22

## 2024-04-30 RX ADMIN — GABAPENTIN 900 MG: 300 CAPSULE ORAL at 16:13

## 2024-04-30 RX ADMIN — GABAPENTIN 900 MG: 300 CAPSULE ORAL at 09:21

## 2024-04-30 RX ADMIN — SODIUM CHLORIDE, SODIUM LACTATE, POTASSIUM CHLORIDE, CALCIUM CHLORIDE, AND DEXTROSE MONOHYDRATE: 600; 310; 30; 20; 5 INJECTION, SOLUTION INTRAVENOUS at 12:10

## 2024-04-30 RX ADMIN — DILTIAZEM HYDROCHLORIDE 120 MG: 120 CAPSULE, COATED, EXTENDED RELEASE ORAL at 09:21

## 2024-04-30 RX ADMIN — METOPROLOL SUCCINATE 50 MG: 50 TABLET, EXTENDED RELEASE ORAL at 09:21

## 2024-04-30 RX ADMIN — ACETAMINOPHEN 650 MG: 325 TABLET, FILM COATED ORAL at 19:40

## 2024-04-30 RX ADMIN — Medication 5 MG: at 19:40

## 2024-04-30 RX ADMIN — MAGNESIUM OXIDE TAB 400 MG (241.3 MG ELEMENTAL MG) 400 MG: 400 (241.3 MG) TAB at 09:21

## 2024-04-30 RX ADMIN — SODIUM CHLORIDE, SODIUM LACTATE, POTASSIUM CHLORIDE, CALCIUM CHLORIDE, AND DEXTROSE MONOHYDRATE: 600; 310; 30; 20; 5 INJECTION, SOLUTION INTRAVENOUS at 01:00

## 2024-04-30 RX ADMIN — GABAPENTIN 900 MG: 300 CAPSULE ORAL at 00:49

## 2024-04-30 ASSESSMENT — ACTIVITIES OF DAILY LIVING (ADL)
ADLS_ACUITY_SCORE: 40

## 2024-04-30 NOTE — PROGRESS NOTES
Care Management Follow Up    Length of Stay (days): 3    Expected Discharge Date: 05/01/2024     Concerns to be Addressed:       Patient plan of care discussed at interdisciplinary rounds: Yes    Anticipated Discharge Disposition: Transitional Care     Anticipated Discharge Services: None  Anticipated Discharge DME: None    Patient/family educated on Medicare website which has current facility and service quality ratings: yes  Education Provided on the Discharge Plan: Yes  Patient/Family in Agreement with the Plan: yes    Referrals Placed by CM/SW: Post Acute Facilities  Private pay costs discussed: Not applicable    Additional Information:    SW spoke with pt spouse over the phone and obtained additional TCU choices. Referrals sent to [facility], [facility], [facility], and [facility]. Pt explains that he plans to be present for her OT session this afternoon. SW following for TCU placement.     Addendum    Pt accepted to [facility] and [facility]. Met with pt and spouse at the bedside who elect [facility]. Provided address to pt spouse per request. Pt would like [facility] transport and is aware of cost.     Plan for discharge to [facility] tomorrow Wednesday 5/1 via [facility] transport between [id number]. Pt, spouse, MD, and facility are aware of discharge time tomorrow.     Samia Watkins/DARON Joshi, AIDA  Inpatient Care Coordination  Emergency Room /Float  [phone]    Samia Watkins, AIDA

## 2024-04-30 NOTE — PLAN OF CARE
"Goal Outcome Evaluation:      Plan of Care Reviewed With: patient    Overall Patient Progress: improvingOverall Patient Progress: improving    Outcome Evaluation: Incontinence overnight, bladder scan shows retention, IV infusing    A/Ox4, VSS, on RA, Ax1 W/GB, sling to left shoulder for support, CMS intact, CIWA=0, PIV infusing D5 in LR @100/hr, tele following, on protocols, pain 2/10, plan to discharge to TCU    Problem: Adult Inpatient Plan of Care  Goal: Plan of Care Review  Description: The Plan of Care Review/Shift note should be completed every shift.  The Outcome Evaluation is a brief statement about your assessment that the patient is improving, declining, or no change.  This information will be displayed automatically on your shift  note.  Outcome: Progressing  Flowsheets (Taken 4/30/2024 4735)  Outcome Evaluation: Incontinence overnight, bladder scan shows retention, IV infusing  Plan of Care Reviewed With: patient  Overall Patient Progress: improving  Goal: Patient-Specific Goal (Individualized)  Description: You can add care plan individualizations to a care plan. Examples of Individualization might be:  \"Parent requests to be called daily at 9am for status\", \"I have a hard time hearing out of my right ear\", or \"Do not touch me to wake me up as it startles  me\".  Outcome: Progressing  Goal: Absence of Hospital-Acquired Illness or Injury  Outcome: Progressing  Intervention: Identify and Manage Fall Risk  Recent Flowsheet Documentation  Taken 4/30/2024 0100 by Kathy Mcnamara, RN  Safety Promotion/Fall Prevention:   activity supervised   assistive device/personal items within reach   clutter free environment maintained   nonskid shoes/slippers when out of bed   mobility aid in reach   room near nurse's station   room organization consistent   safety round/check completed   supervised activity  Intervention: Prevent Skin Injury  Recent Flowsheet Documentation  Taken 4/30/2024 0100 by Kathy Mcnamara, RN  Body " Position:   position changed independently   supine, head elevated  Skin Protection: adhesive use limited  Device Skin Pressure Protection:   absorbent pad utilized/changed   adhesive use limited  Intervention: Prevent Infection  Recent Flowsheet Documentation  Taken 4/30/2024 0100 by Kathy Mcnamara RN  Infection Prevention:   single patient room provided   rest/sleep promoted   environmental surveillance performed  Goal: Optimal Comfort and Wellbeing  Outcome: Progressing  Intervention: Monitor Pain and Promote Comfort  Recent Flowsheet Documentation  Taken 4/30/2024 0100 by Kathy Mcnamara RN  Pain Management Interventions: declines  Goal: Readiness for Transition of Care  Outcome: Progressing     Problem: Fall Injury Risk  Goal: Absence of Fall and Fall-Related Injury  Outcome: Progressing  Intervention: Identify and Manage Contributors  Recent Flowsheet Documentation  Taken 4/30/2024 0100 by Kathy Mcnamara RN  Medication Review/Management: medications reviewed  Intervention: Promote Injury-Free Environment  Recent Flowsheet Documentation  Taken 4/30/2024 0100 by Kathy Mcnamara RN  Safety Promotion/Fall Prevention:   activity supervised   assistive device/personal items within reach   clutter free environment maintained   nonskid shoes/slippers when out of bed   mobility aid in reach   room near nurse's station   room organization consistent   safety round/check completed   supervised activity     Problem: Comorbidity Management  Goal: Blood Pressure in Desired Range  Outcome: Progressing  Intervention: Maintain Blood Pressure Management  Recent Flowsheet Documentation  Taken 4/30/2024 0100 by Kathy Mcnamara RN  Medication Review/Management: medications reviewed     Problem: Pain Acute  Goal: Optimal Pain Control and Function  Outcome: Progressing  Intervention: Develop Pain Management Plan  Recent Flowsheet Documentation  Taken 4/30/2024 0100 by Kathy Mcnamara RN  Pain Management Interventions:  declines  Intervention: Prevent or Manage Pain  Recent Flowsheet Documentation  Taken 4/30/2024 0100 by Kathy Mcnamara, RN  Medication Review/Management: medications reviewed

## 2024-04-30 NOTE — PLAN OF CARE
"Goal Outcome Evaluation:     Plan of Care Reviewed With: patient, spouse    Overall Patient Progress: improvingOverall Patient Progress: improving    Outcome Evaluation: IV infusing, denied pain    A&Ox4, VSS. On RA. Denied pain or discomfort. Assist using quad cane & gait belt. On regular diet & has poor appetite. On tele. SR HR 60s. D5LR  infusing at 100ml/hr. Incontinent of bladder.     Problem: Adult Inpatient Plan of Care  Goal: Plan of Care Review  Description: The Plan of Care Review/Shift note should be completed every shift.  The Outcome Evaluation is a brief statement about your assessment that the patient is improving, declining, or no change.  This information will be displayed automatically on your shift  note.  Outcome: Progressing  Flowsheets (Taken 4/30/2024 1541)  Outcome Evaluation: IV infusing, denied pain  Plan of Care Reviewed With:   patient   spouse  Overall Patient Progress: improving  Goal: Patient-Specific Goal (Individualized)  Description: You can add care plan individualizations to a care plan. Examples of Individualization might be:  \"Parent requests to be called daily at 9am for status\", \"I have a hard time hearing out of my right ear\", or \"Do not touch me to wake me up as it startles  me\".  Outcome: Progressing  Goal: Absence of Hospital-Acquired Illness or Injury  Outcome: Progressing  Intervention: Identify and Manage Fall Risk  Recent Flowsheet Documentation  Taken 4/30/2024 0900 by Mary Kramer RN  Safety Promotion/Fall Prevention: activity supervised  Intervention: Prevent and Manage VTE (Venous Thromboembolism) Risk  Recent Flowsheet Documentation  Taken 4/30/2024 0900 by Mary Kramer RN  VTE Prevention/Management: compression stockings off  Intervention: Prevent Infection  Recent Flowsheet Documentation  Taken 4/30/2024 0900 by Mary Kramer RN  Infection Prevention:   hand hygiene promoted   personal protective equipment utilized  Goal: Optimal Comfort and " Wellbeing  Outcome: Progressing  Goal: Readiness for Transition of Care  Outcome: Progressing     Problem: Fall Injury Risk  Goal: Absence of Fall and Fall-Related Injury  Outcome: Progressing  Intervention: Identify and Manage Contributors  Recent Flowsheet Documentation  Taken 4/30/2024 0900 by Mary Kramer RN  Medication Review/Management: medications reviewed  Intervention: Promote Injury-Free Environment  Recent Flowsheet Documentation  Taken 4/30/2024 0900 by Mary Kramer RN  Safety Promotion/Fall Prevention: activity supervised     Problem: Comorbidity Management  Goal: Blood Pressure in Desired Range  Outcome: Progressing  Intervention: Maintain Blood Pressure Management  Recent Flowsheet Documentation  Taken 4/30/2024 0900 by Mary Kramer RN  Medication Review/Management: medications reviewed     Problem: Pain Acute  Goal: Optimal Pain Control and Function  Outcome: Progressing  Intervention: Prevent or Manage Pain  Recent Flowsheet Documentation  Taken 4/30/2024 0900 by Mary Kramer RN  Medication Review/Management: medications reviewed  Intervention: Optimize Psychosocial Wellbeing  Recent Flowsheet Documentation  Taken 4/30/2024 0900 by Mary Kramer RN  Supportive Measures: active listening utilized

## 2024-04-30 NOTE — PLAN OF CARE
"Goal Outcome Evaluation:    A&O x4. Scored 0 on CIWA. Pain controlled with tylenol. Assist x1 with gate belt and cane. Incontinent of urine. Purewick applied at bed time. Mag recheck was 2.4. Encouraged to keep sling on L arm. NWB on LUE. CMS intact. Falls risk precautions in place.   Problem: Adult Inpatient Plan of Care  Goal: Plan of Care Review  Description: The Plan of Care Review/Shift note should be completed every shift.  The Outcome Evaluation is a brief statement about your assessment that the patient is improving, declining, or no change.  This information will be displayed automatically on your shift  note.  Outcome: Progressing  Flowsheets (Taken 4/29/2024 2252)  Plan of Care Reviewed With: patient  Overall Patient Progress: improving  Goal: Patient-Specific Goal (Individualized)  Description: You can add care plan individualizations to a care plan. Examples of Individualization might be:  \"Parent requests to be called daily at 9am for status\", \"I have a hard time hearing out of my right ear\", or \"Do not touch me to wake me up as it startles  me\".  Outcome: Progressing  Goal: Absence of Hospital-Acquired Illness or Injury  Outcome: Progressing  Intervention: Identify and Manage Fall Risk  Recent Flowsheet Documentation  Taken 4/29/2024 1700 by Jayde Ribera RN  Safety Promotion/Fall Prevention: activity supervised  Intervention: Prevent Skin Injury  Recent Flowsheet Documentation  Taken 4/29/2024 1700 by Jayde Ribera, RN  Body Position: position changed independently  Skin Protection: adhesive use limited  Device Skin Pressure Protection: absorbent pad utilized/changed  Intervention: Prevent and Manage VTE (Venous Thromboembolism) Risk  Recent Flowsheet Documentation  Taken 4/29/2024 1700 by Jayde Ribera, RN  VTE Prevention/Management: SCDs (sequential compression devices) on  Intervention: Prevent Infection  Recent Flowsheet Documentation  Taken 4/29/2024 1700 by Jayde Ribera, " RN  Infection Prevention:   single patient room provided   rest/sleep promoted   hand hygiene promoted  Goal: Optimal Comfort and Wellbeing  Outcome: Progressing  Intervention: Monitor Pain and Promote Comfort  Recent Flowsheet Documentation  Taken 4/29/2024 1700 by Jayde Ribera RN  Pain Management Interventions: cold applied  Goal: Readiness for Transition of Care  Outcome: Progressing     Problem: Fall Injury Risk  Goal: Absence of Fall and Fall-Related Injury  Outcome: Progressing  Intervention: Identify and Manage Contributors  Recent Flowsheet Documentation  Taken 4/29/2024 1700 by Jayde Ribera RN  Medication Review/Management: medications reviewed  Intervention: Promote Injury-Free Environment  Recent Flowsheet Documentation  Taken 4/29/2024 1700 by Jayde Ribera RN  Safety Promotion/Fall Prevention: activity supervised     Problem: Comorbidity Management  Goal: Blood Pressure in Desired Range  Outcome: Progressing  Intervention: Maintain Blood Pressure Management  Recent Flowsheet Documentation  Taken 4/29/2024 1700 by Jayde Ribera RN  Medication Review/Management: medications reviewed     Problem: Pain Acute  Goal: Optimal Pain Control and Function  Outcome: Progressing  Intervention: Develop Pain Management Plan  Recent Flowsheet Documentation  Taken 4/29/2024 1700 by Jayde Ribera RN  Pain Management Interventions: cold applied  Intervention: Prevent or Manage Pain  Recent Flowsheet Documentation  Taken 4/29/2024 1700 by Jayde Ribera RN  Bowel Elimination Promotion: adequate fluid intake promoted  Medication Review/Management: medications reviewed       Plan of Care Reviewed With: patient    Overall Patient Progress: improvingOverall Patient Progress: improving

## 2024-04-30 NOTE — PROGRESS NOTES
[facility] Progress Note  Patient: Rizwana Aguilar  MRN: [id number]  Admission Date: 4/27/2024      Assessment & Plan      Rizwana Aguilar is a 73 year old female with PMH significant for paroxysymal afib not on anticoagulation, HTN, alcohol use disorder, alcoholic liver disease, pancreatitis, breast cancer, memory loss, erosive gastropathy admitted on 4/27/2024 after fall with left shoulder pain.      AGMA, now resolved with IV fluids  Mild lactic acidosis, resolved  Alcoholic vs starvation ketosis, resolved  Presents after fall.  Tripped over stair.  Patient endorses nausea and vomiting.  Has not been able to keep even water down.  This has persisted for multiple days.  In addition, patient has been drinking vodka.  She reports 2 drinks daily but this is likely severely under estimated.  AG 31, HCO3 15.  Ketones >8.  Lactic acid 2.2.  Most likely combination alcoholic and starvation ketosis.   Received IV fluid boluses and was maintained on LR at 100 Elemo per hour.  Diet advanced to regular diet and tolerated.    Tachycardia, unspecified, resolved.    History of atrial fibrillation s/p watchman device:  HR 150s on arrival.  Improved to 130s after 2L IV fluids.  This is likely multifactorial in the setting of dehydration, poor oral intake, electrolyte disturbance, acidosis.  In addition, the patient has a history of paroxysmal atrial fibrillation and has not taken her rate control medications for the past couple of days.  EKG on arrival appears consistent with sinus tachycardia, although rate is fast and so it is difficulty to say for sure.  Consideration for afib RVR vs aflutter.  Blood pressure stable.   -Start PTA metoprolol, diltiazem  -Aggressive IVF resuscitation as above  -K goal >4.  Mag goal >2.  -Treat alcohol withdrawal, ketoacidosis       Mechanical fall complicated  displaced left clavicular fracture:  Pt reports tripping and falling on stairs.  Denies LOC or trauma.  Hurt her left  shoulder.  XR imaging shows mildly displaced left clavical fracture.  Exam shows bruising around the area.   reports that she was at nursing home approximately 1 year ago.  She was doing quite well while they are with physical therapy.  She then discharged herself and returned home.  She since then has had significant decline per her .  -Continue sling started in the ER  -Will consult ortho  -Anticipate non-op management so will not keep NPO  -PT/OT eval     Transaminitis  Alcohol use disorder:  Patient reports 2 watered down vodka drinks daily.  Suspect she severely underplays her use.   reports a minimum of 4 drinks a night.  Generally 2 vodka drinks, 2 large pours of alcohol, and this may be followed by a glass of daniel before bed.  This has complicated family matters for 5 years now per .  ETOH negative on arrival.  , ALT 96.  Fairly consistent with alcohol related injury pattern.  Patient tremulous.  High risk of alcohol withdrawal  -CIWA protocol with ativan and gabapentin.   -Thiamine & folate  -Offer CD/SW when clinically improved  -Repeat CMP in AM     Hypertension:  Blood pressure 170s systolic on arrival.  Patient reports she has not been able to keep down her BP medications over the past couple of days.   -Restart PTA metoprolol  -Restart other blood pressure medications once med rec is complete - patient unable to tell me her meds     Hypomagnesemia: Likely related to poor PO intake.  Replacement protocol ordered.     Breast cancer: s/p lumpectomy: Follows with oncology at [facility]. Decided against oncotype testing and chemo and adjuvant hormonal therapy.     Memory loss: Per chart review this is thought related to alcohol use.  Complicates cares.  At risk of delirium.      Urinary incontinence:  Chronic problem for her.   Fecal incontinence/urgency: Improving slightly. Possibly related to electrolyte replacement. Monitor.     Erosive gastropathy: Pantoprazole  in place of PTA omeprazole  Dizziness: I believe she is prescribed meclizine  MDD:    Thrombocytopenia.  She was receiving Lovenox 40 mg subcu daily.  Suspect platelet count dropped in the setting of EtOH use disorder (most likely) versus heparin-induced thrombocytopenia, which I think is unlikely the etiology at this moment..  No any bleeding diathesis.  Will check CBC in a.m. tomorrow.  Lovenox 40 mg subcu is put on hold.     Disposition: Awaiting placement. Patient is medically ready for discharge.     Diet: Regular Diet Adult    DVT Prophylaxis: Enoxaparin (Lovenox) SQ  Billings Catheter: Not present  Lines: None     Cardiac Monitoring: None  Code Status:  FULL  Isidoro Dominguez MD  Internal Medicine Staff Hospitalist   [facility]   Pager: [phone]  Page Cross Cover after 5 pm: pager [phone]   ___________________________________________________________________    Subjective & Interval Hx:    Patient doing well. No acute events overnight. Awaiting placement. She is unhappy with having to go to TCU, but understands the need to do so.    Medications: Reviewed in EPIC. List below for reference    Current Facility-Administered Medications:     acetaminophen (TYLENOL) tablet 650 mg, 650 mg, Oral, Q4H PRN, 650 mg at 04/29/24 2123 **OR** acetaminophen (TYLENOL) Suppository 650 mg, 650 mg, Rectal, Q4H PRN, Luke Yen DO    calcium carbonate (TUMS) chewable tablet 1,000 mg, 1,000 mg, Oral, 4x Daily PRN, Luke Yen DO    dextrose 5% in lactated ringers infusion, , Intravenous, Continuous, Luke Yen DO, Last Rate: 100 mL/hr at 04/30/24 1210, New Bag at 04/30/24 1210    diltiazem ER COATED BEADS (CARDIZEM CD/CARTIA XT) 24 hr capsule 120 mg, 120 mg, Oral, Daily, Isidoro Dominguez MD, 120 mg at 04/30/24 0921    [Held by provider] enoxaparin ANTICOAGULANT (LOVENOX) injection 40 mg, 40 mg, Subcutaneous, Q24H, Luke Yen DO, 40 mg at 04/27/24 1182    flumazenil (ROMAZICON) injection  0.2 mg, 0.2 mg, Intravenous, q1 min prn, Luke Yen DO    folic acid (FOLVITE) tablet 1 mg, 1 mg, Oral, Daily, Luke Yen DO, 1 mg at 04/30/24 0922    [START ON 5/5/2024] gabapentin (NEURONTIN) capsule 100 mg, 100 mg, Oral, Q8H, Luke Yen,     [START ON 5/3/2024] gabapentin (NEURONTIN) capsule 300 mg, 300 mg, Oral, Q8H, Luke Yen,     [START ON 5/1/2024] gabapentin (NEURONTIN) capsule 600 mg, 600 mg, Oral, Q8H, Luke Yen DO    gabapentin (NEURONTIN) capsule 900 mg, 900 mg, Oral, Q8H, Luke Yen DO, 900 mg at 04/30/24 0921    OLANZapine zydis (zyPREXA) ODT tab 5-10 mg, 5-10 mg, Oral, Q6H PRN **OR** haloperidol lactate (HALDOL) injection 2.5-5 mg, 2.5-5 mg, Intravenous, Q6H PRN, Luke Yen DO    lidocaine (LMX4) cream, , Topical, Q1H PRN, Luke Yen DO    lidocaine 1 % 0.1-1 mL, 0.1-1 mL, Other, Q1H PRN, Luke Yen DO    LORazepam (ATIVAN) tablet 1-2 mg, 1-2 mg, Oral, Q30 Min PRN **OR** LORazepam (ATIVAN) injection 1-2 mg, 1-2 mg, Intravenous, Q30 Min PRN, Luke Yen DO    magnesium oxide (MAG-OX) tablet 400 mg, 400 mg, Oral, Q4H, Luke Yen DO, 400 mg at 04/30/24 0921    melatonin tablet 5 mg, 5 mg, Oral, QPM PRN, Luke Yen DO, 5 mg at 04/29/24 2122    metoprolol succinate ER (TOPROL XL) 24 hr tablet 50 mg, 50 mg, Oral, Daily, Isidoro Dominguez MD, 50 mg at 04/30/24 0921    multivitamin w/minerals (THERA-VIT-M) tablet 1 tablet, 1 tablet, Oral, Daily, Luke Yen DO, 1 tablet at 04/30/24 0921    ondansetron (ZOFRAN ODT) ODT tab 4 mg, 4 mg, Oral, Q6H PRN **OR** ondansetron (ZOFRAN) injection 4 mg, 4 mg, Intravenous, Q6H PRN, Luke Yen DO    prochlorperazine (COMPAZINE) injection 5 mg, 5 mg, Intravenous, Q6H PRN **OR** prochlorperazine (COMPAZINE) tablet 5 mg, 5 mg, Oral, Q6H PRN **OR** prochlorperazine (COMPAZINE) suppository 12.5 mg, 12.5 mg, Rectal, Q12H PRN, Luke Yen DO    senna-docusate (SENOKOT-S/PERICOLACE) 8.6-50 MG  "per tablet 1 tablet, 1 tablet, Oral, BID PRN **OR** senna-docusate (SENOKOT-S/PERICOLACE) 8.6-50 MG per tablet 2 tablet, 2 tablet, Oral, BID PRN, Luke Yen, DO    sodium chloride (PF) 0.9% PF flush 3 mL, 3 mL, Intracatheter, Q8H, Baxa, Luke, DO, 3 mL at 04/30/24 0932    sodium chloride (PF) 0.9% PF flush 3 mL, 3 mL, Intracatheter, q1 min prn, Magnoa, Luke, DO    thiamine (B-1) tablet 100 mg, 100 mg, Oral, Daily, Baxa, Luke, DO, 100 mg at 04/30/24 0922    Physical Exam:    Temp: 98.6  F (37  C) Temp src: Tympanic BP: 134/72 Pulse: 69   Resp: 20 SpO2: 97 % O2 Device: None (Room air)   Height: 167.6 cm (5' 6\") Weight: 63.5 kg (140 lb)  Estimated body mass index is 22.6 kg/m  as calculated from the following:    Height as of this encounter: 1.676 m (5' 6\").    Weight as of this encounter: 63.5 kg (140 lb).    General: Very pleasant female resting comfortably in hospital bed.  Awake, alert, interactive.   HEENT: Normocephalic, atraumatic.  PERRL, EOMI.  Conjunctiva clear, sclerae anicteric.  Mucous membranes moist.  Respiratory: Normal work of breathing.    Musculoskeletal: Moving all extremities appropriately.  Skin: No rashes or abrasions on exposed skin.  Neurologic: Alert and oriented x4.  Cranial nerves II through XII grossly intact.  Psychologic: Appropriate mood and affect.      Labs & Studies of Note: I personally reviewed the following studies:  CBC RESULTS:   Recent Labs   Lab Test 04/28/24  0732   WBC 3.3*   RBC 3.24*   HGB 12.0   HCT 34.1*   *   MCH 37.0*   MCHC 35.2   RDW 12.2   *     Last Comprehensive Metabolic Panel:  Lab Results   Component Value Date     04/28/2024    POTASSIUM 3.6 04/30/2024    CHLORIDE 101 04/28/2024    CO2 22 04/28/2024    ANIONGAP 14 04/28/2024     (H) 04/28/2024    BUN 5.3 (L) 04/28/2024    CR 0.49 (L) 04/30/2024    GFRESTIMATED >90 04/30/2024    KHUSHI 9.0 04/28/2024         "

## 2024-05-01 VITALS
BODY MASS INDEX: 22.5 KG/M2 | SYSTOLIC BLOOD PRESSURE: 155 MMHG | TEMPERATURE: 98.3 F | HEIGHT: 66 IN | DIASTOLIC BLOOD PRESSURE: 84 MMHG | WEIGHT: 140 LBS | OXYGEN SATURATION: 96 % | RESPIRATION RATE: 14 BRPM | HEART RATE: 76 BPM

## 2024-05-01 LAB
HOLD SPECIMEN: NORMAL
MAGNESIUM SERPL-MCNC: 1.6 MG/DL (ref 1.7–2.3)
PHOSPHATE SERPL-MCNC: 3 MG/DL (ref 2.5–4.5)
POTASSIUM SERPL-SCNC: 3.8 MMOL/L (ref 3.4–5.3)

## 2024-05-01 PROCEDURE — 84132 ASSAY OF SERUM POTASSIUM: CPT | Performed by: STUDENT IN AN ORGANIZED HEALTH CARE EDUCATION/TRAINING PROGRAM

## 2024-05-01 PROCEDURE — 250N000011 HC RX IP 250 OP 636: Performed by: STUDENT IN AN ORGANIZED HEALTH CARE EDUCATION/TRAINING PROGRAM

## 2024-05-01 PROCEDURE — 83735 ASSAY OF MAGNESIUM: CPT | Performed by: STUDENT IN AN ORGANIZED HEALTH CARE EDUCATION/TRAINING PROGRAM

## 2024-05-01 PROCEDURE — 250N000013 HC RX MED GY IP 250 OP 250 PS 637: Performed by: STUDENT IN AN ORGANIZED HEALTH CARE EDUCATION/TRAINING PROGRAM

## 2024-05-01 PROCEDURE — 84100 ASSAY OF PHOSPHORUS: CPT | Performed by: STUDENT IN AN ORGANIZED HEALTH CARE EDUCATION/TRAINING PROGRAM

## 2024-05-01 PROCEDURE — 36415 COLL VENOUS BLD VENIPUNCTURE: CPT | Performed by: STUDENT IN AN ORGANIZED HEALTH CARE EDUCATION/TRAINING PROGRAM

## 2024-05-01 PROCEDURE — 250N000013 HC RX MED GY IP 250 OP 250 PS 637: Performed by: INTERNAL MEDICINE

## 2024-05-01 RX ORDER — GABAPENTIN 300 MG/1
CAPSULE ORAL
DISCHARGE
Start: 2024-05-01 | End: 2024-06-15

## 2024-05-01 RX ORDER — MULTIPLE VITAMINS W/ MINERALS TAB 9MG-400MCG
1 TAB ORAL DAILY
DISCHARGE
Start: 2024-05-01 | End: 2024-06-15

## 2024-05-01 RX ORDER — LANOLIN ALCOHOL/MO/W.PET/CERES
100 CREAM (GRAM) TOPICAL DAILY
DISCHARGE
Start: 2024-05-01 | End: 2024-06-15

## 2024-05-01 RX ORDER — MAGNESIUM OXIDE 400 MG/1
400 TABLET ORAL EVERY 4 HOURS
Qty: 2 TABLET | Refills: 0 | Status: DISCONTINUED | OUTPATIENT
Start: 2024-05-01 | End: 2024-05-01 | Stop reason: HOSPADM

## 2024-05-01 RX ORDER — FOLIC ACID 1 MG/1
1 TABLET ORAL DAILY
DISCHARGE
Start: 2024-05-01

## 2024-05-01 RX ORDER — POTASSIUM CHLORIDE 1500 MG/1
20 TABLET, EXTENDED RELEASE ORAL ONCE
Qty: 1 TABLET | Refills: 0 | Status: COMPLETED | OUTPATIENT
Start: 2024-05-01 | End: 2024-05-01

## 2024-05-01 RX ORDER — GABAPENTIN 100 MG/1
100 CAPSULE ORAL EVERY 8 HOURS
DISCHARGE
Start: 2024-05-05 | End: 2024-06-15

## 2024-05-01 RX ORDER — ONDANSETRON 4 MG/1
4 TABLET, ORALLY DISINTEGRATING ORAL EVERY 6 HOURS PRN
DISCHARGE
Start: 2024-05-01 | End: 2024-06-15

## 2024-05-01 RX ORDER — AMOXICILLIN 250 MG
1-2 CAPSULE ORAL 2 TIMES DAILY PRN
DISCHARGE
Start: 2024-05-01 | End: 2024-06-15

## 2024-05-01 RX ADMIN — POTASSIUM CHLORIDE 20 MEQ: 1500 TABLET, EXTENDED RELEASE ORAL at 09:44

## 2024-05-01 RX ADMIN — DILTIAZEM HYDROCHLORIDE 120 MG: 120 CAPSULE, COATED, EXTENDED RELEASE ORAL at 09:44

## 2024-05-01 RX ADMIN — SODIUM CHLORIDE, SODIUM LACTATE, POTASSIUM CHLORIDE, CALCIUM CHLORIDE, AND DEXTROSE MONOHYDRATE: 600; 310; 30; 20; 5 INJECTION, SOLUTION INTRAVENOUS at 01:07

## 2024-05-01 RX ADMIN — Medication 1 TABLET: at 09:44

## 2024-05-01 RX ADMIN — FOLIC ACID 1 MG: 1 TABLET ORAL at 09:45

## 2024-05-01 RX ADMIN — METOPROLOL SUCCINATE 50 MG: 50 TABLET, EXTENDED RELEASE ORAL at 09:45

## 2024-05-01 RX ADMIN — THIAMINE HCL TAB 100 MG 100 MG: 100 TAB at 09:44

## 2024-05-01 RX ADMIN — GABAPENTIN 600 MG: 300 CAPSULE ORAL at 02:10

## 2024-05-01 RX ADMIN — GABAPENTIN 600 MG: 300 CAPSULE ORAL at 09:45

## 2024-05-01 RX ADMIN — MAGNESIUM OXIDE TAB 400 MG (241.3 MG ELEMENTAL MG) 400 MG: 400 (241.3 MG) TAB at 09:45

## 2024-05-01 ASSESSMENT — ACTIVITIES OF DAILY LIVING (ADL)
ADLS_ACUITY_SCORE: 40
ADLS_ACUITY_SCORE: 43
ADLS_ACUITY_SCORE: 43
ADLS_ACUITY_SCORE: 40

## 2024-05-01 NOTE — PLAN OF CARE
"A&O x4. Scored 0 on CIWA. L shoulder pain controlled with tylenol and ice. Assist x1 with GB and cane. Up in chair. Incontinent of bowel and bladder. No BM this shift. K and Mg replaced PO this morning. L arm sling in place. NWB on LUE. Significant bruising to left shoulder. CMS intact. Tele and fluids discontinued. Pt discharged via WC transport to [facility] TCU. All personal belongings sent with patient.   Problem: Adult Inpatient Plan of Care  Goal: Plan of Care Review  Description: The Plan of Care Review/Shift note should be completed every shift.  The Outcome Evaluation is a brief statement about your assessment that the patient is improving, declining, or no change.  This information will be displayed automatically on your shift  note.  Outcome: Adequate for Care Transition  Flowsheets (Taken 5/1/2024 1059)  Outcome Evaluation: Fluids and tele discontinued. Transferring to [facility] via WC transport.  Plan of Care Reviewed With: patient  Overall Patient Progress: improving  Goal: Patient-Specific Goal (Individualized)  Description: You can add care plan individualizations to a care plan. Examples of Individualization might be:  \"Parent requests to be called daily at 9am for status\", \"I have a hard time hearing out of my right ear\", or \"Do not touch me to wake me up as it startles  me\".  Outcome: Adequate for Care Transition  Goal: Absence of Hospital-Acquired Illness or Injury  Outcome: Adequate for Care Transition  Intervention: Identify and Manage Fall Risk  Recent Flowsheet Documentation  Taken 5/1/2024 1000 by Sil Roque, RN  Safety Promotion/Fall Prevention:   activity supervised   assistive device/personal items within reach   clutter free environment maintained   nonskid shoes/slippers when out of bed   room near nurse's station   room organization consistent   safety round/check completed   supervised activity  Intervention: Prevent Skin Injury  Recent Flowsheet " Documentation  Taken 5/1/2024 1000 by Sil Roque RN  Skin Protection: adhesive use limited  Device Skin Pressure Protection: absorbent pad utilized/changed  Taken 5/1/2024 0724 by Sil Roque RN  Body Position: supine, head elevated  Intervention: Prevent and Manage VTE (Venous Thromboembolism) Risk  Recent Flowsheet Documentation  Taken 5/1/2024 1000 by Sil Roque RN  VTE Prevention/Management: SCDs (sequential compression devices) off  Intervention: Prevent Infection  Recent Flowsheet Documentation  Taken 5/1/2024 1000 by Sil Roque RN  Infection Prevention:   rest/sleep promoted   single patient room provided   environmental surveillance performed  Goal: Optimal Comfort and Wellbeing  Outcome: Adequate for Care Transition  Intervention: Monitor Pain and Promote Comfort  Recent Flowsheet Documentation  Taken 5/1/2024 0724 by Sil Roque RN  Pain Management Interventions: cold applied  Goal: Readiness for Transition of Care  Outcome: Adequate for Care Transition     Problem: Fall Injury Risk  Goal: Absence of Fall and Fall-Related Injury  Outcome: Adequate for Care Transition  Intervention: Promote Injury-Free Environment  Recent Flowsheet Documentation  Taken 5/1/2024 1000 by Sil Roque RN  Safety Promotion/Fall Prevention:   activity supervised   assistive device/personal items within reach   clutter free environment maintained   nonskid shoes/slippers when out of bed   room near nurse's station   room organization consistent   safety round/check completed   supervised activity     Problem: Comorbidity Management  Goal: Blood Pressure in Desired Range  Outcome: Adequate for Care Transition     Problem: Pain Acute  Goal: Optimal Pain Control and Function  Outcome: Adequate for Care Transition  Intervention: Develop Pain Management Plan  Recent Flowsheet Documentation  Taken 5/1/2024 0724 by Sil Roque RN  Pain Management Interventions: cold applied  Intervention: Optimize  Psychosocial Wellbeing  Recent Flowsheet Documentation  Taken 5/1/2024 1000 by Sil Roque, RN  Supportive Measures:   active listening utilized   positive reinforcement provided   self-care encouraged   Goal Outcome Evaluation:      Plan of Care Reviewed With: patient    Overall Patient Progress: improvingOverall Patient Progress: improving    Outcome Evaluation: Fluids and tele discontinued. Transferring to [facility] TCU via WC transport.

## 2024-05-01 NOTE — PLAN OF CARE
Occupational Therapy Discharge Summary    Reason for therapy discharge:    Discharged to transitional care facility.    Progress towards therapy goal(s). See goals on Care Plan in [facility] electronic health record for goal details.  Goals not met.  Barriers to achieving goals:   discharge from facility.    Therapy recommendation(s):    Continued therapy is recommended.  Rationale/Recommendations:  Pt currently requiring Ax1 with mobility and ADLs. At this time, pt and  agreeable to TCU as he does not feel he can provide level of care pt needs at this time. Recommend TCU to maximize indep with ADLs/IADLs.

## 2024-05-01 NOTE — PLAN OF CARE
"Goal Outcome Evaluation:      Plan of Care Reviewed With: patient    Overall Patient Progress: improvingOverall Patient Progress: improving    Outcome Evaluation: IV infusing, denies pain, incontinence x2 overnight    A/Ox4, VSS, on RA, IV infusing D5 I nLR @100/hr, Ax1 BG/C, L shoulder non weight bearing, sling in place for support, tele following, CIWA=0, on protocols, incontinent overnight, plan to discharge today to TCU    Problem: Adult Inpatient Plan of Care  Goal: Plan of Care Review  Description: The Plan of Care Review/Shift note should be completed every shift.  The Outcome Evaluation is a brief statement about your assessment that the patient is improving, declining, or no change.  This information will be displayed automatically on your shift  note.  Outcome: Progressing  Flowsheets (Taken 5/1/2024 0810)  Outcome Evaluation: IV infusing, denies pain, incontinence x2 overnight  Plan of Care Reviewed With: patient  Overall Patient Progress: improving  Goal: Patient-Specific Goal (Individualized)  Description: You can add care plan individualizations to a care plan. Examples of Individualization might be:  \"Parent requests to be called daily at 9am for status\", \"I have a hard time hearing out of my right ear\", or \"Do not touch me to wake me up as it startles  me\".  Outcome: Progressing  Goal: Absence of Hospital-Acquired Illness or Injury  Outcome: Progressing  Intervention: Identify and Manage Fall Risk  Recent Flowsheet Documentation  Taken 5/1/2024 0210 by Kathy Mcnamara, RN  Safety Promotion/Fall Prevention:   activity supervised   assistive device/personal items within reach   clutter free environment maintained   nonskid shoes/slippers when out of bed   room near nurse's station   room organization consistent   safety round/check completed   supervised activity  Intervention: Prevent Skin Injury  Recent Flowsheet Documentation  Taken 5/1/2024 0210 by Kathy Mcnamara, RN  Skin Protection: adhesive use " limited  Device Skin Pressure Protection: absorbent pad utilized/changed  Intervention: Prevent and Manage VTE (Venous Thromboembolism) Risk  Recent Flowsheet Documentation  Taken 5/1/2024 0210 by Kathy Mcnamara RN  VTE Prevention/Management: SCDs (sequential compression devices) off  Intervention: Prevent Infection  Recent Flowsheet Documentation  Taken 5/1/2024 0210 by Kathy Mcnamara RN  Infection Prevention:   rest/sleep promoted   single patient room provided   environmental surveillance performed  Goal: Optimal Comfort and Wellbeing  Outcome: Progressing  Intervention: Monitor Pain and Promote Comfort  Recent Flowsheet Documentation  Taken 5/1/2024 0210 by Kathy Mcnamara RN  Pain Management Interventions:   care clustered   quiet environment facilitated   rest  Goal: Readiness for Transition of Care  Outcome: Progressing     Problem: Fall Injury Risk  Goal: Absence of Fall and Fall-Related Injury  Outcome: Progressing  Intervention: Promote Injury-Free Environment  Recent Flowsheet Documentation  Taken 5/1/2024 0210 by Kathy Mcnamara RN  Safety Promotion/Fall Prevention:   activity supervised   assistive device/personal items within reach   clutter free environment maintained   nonskid shoes/slippers when out of bed   room near nurse's station   room organization consistent   safety round/check completed   supervised activity     Problem: Comorbidity Management  Goal: Blood Pressure in Desired Range  Outcome: Progressing     Problem: Pain Acute  Goal: Optimal Pain Control and Function  Outcome: Progressing  Intervention: Develop Pain Management Plan  Recent Flowsheet Documentation  Taken 5/1/2024 0210 by Kathy Mcnamara RN  Pain Management Interventions:   care clustered   quiet environment facilitated   rest  Intervention: Prevent or Manage Pain  Recent Flowsheet Documentation  Taken 5/1/2024 0210 by Kathy Mcnamara RN  Sensory Stimulation Regulation:   care clustered   quiet environment promoted

## 2024-05-01 NOTE — PROGRESS NOTES
Care Management Discharge Note    Discharge Date: 05/01/2024       Discharge Disposition: Transitional Care    Discharge Services: None    Discharge DME: None    Discharge Transportation: family or friend will provide    Private pay costs discussed: transportation costs    PAS Confirmation Code: [id number]  Patient/family educated on Medicare website which has current facility and service quality ratings: yes    Education Provided on the Discharge Plan: Yes  Persons Notified of Discharge Plans: patient, spouse and TCU  Patient/Family in Agreement with the Plan: yes    Additional Information:  Pt transferring to [facility] TCU today via WC transport.  Orders sent.    DARON Jay, [facility]  Inpatient Care Coordination  [facility]  [phone]

## 2024-05-01 NOTE — PLAN OF CARE
Physical Therapy Discharge Summary    Reason for therapy discharge:    Discharged to transitional care facility.    Progress towards therapy goal(s). See goals on Care Plan in [facility] electronic health record for goal details.  Goals not met.  Barriers to achieving goals:   discharge from facility.    Therapy recommendation(s):    Continued therapy is recommended.  Rationale/Recommendations:  PT as indicated at TCU.

## 2024-05-01 NOTE — PLAN OF CARE
"Goal Outcome Evaluation:    Patient A&O x4  VSS throughout shift on RA  Pain managed with Tylenol & Gabapentin   Voiding ok  Transfers Ax1 WB & cane   SR in 60's   Regular diet maintained   CIWA score 0 (x2)  Sling for LUE used     Problem: Adult Inpatient Plan of Care  Goal: Plan of Care Review  Description: The Plan of Care Review/Shift note should be completed every shift.  The Outcome Evaluation is a brief statement about your assessment that the patient is improving, declining, or no change.  This information will be displayed automatically on your shift  note.  Outcome: Progressing  Flowsheets (Taken 4/30/2024 2044)  Plan of Care Reviewed With: patient  Overall Patient Progress: improving  Goal: Patient-Specific Goal (Individualized)  Description: You can add care plan individualizations to a care plan. Examples of Individualization might be:  \"Parent requests to be called daily at 9am for status\", \"I have a hard time hearing out of my right ear\", or \"Do not touch me to wake me up as it startles  me\".  Outcome: Progressing  Goal: Absence of Hospital-Acquired Illness or Injury  Outcome: Progressing  Intervention: Identify and Manage Fall Risk  Recent Flowsheet Documentation  Taken 4/30/2024 1614 by Carey Martinez RN  Safety Promotion/Fall Prevention: activity supervised  Intervention: Prevent Skin Injury  Recent Flowsheet Documentation  Taken 4/30/2024 1614 by Carey Martinez RN  Body Position: position changed independently  Skin Protection: adhesive use limited  Device Skin Pressure Protection: absorbent pad utilized/changed  Intervention: Prevent and Manage VTE (Venous Thromboembolism) Risk  Recent Flowsheet Documentation  Taken 4/30/2024 1614 by Carey Martinez RN  VTE Prevention/Management: SCDs (sequential compression devices) off  Intervention: Prevent Infection  Recent Flowsheet Documentation  Taken 4/30/2024 1614 by Carey Martinez RN  Infection Prevention: hand hygiene promoted  Goal: " Optimal Comfort and Wellbeing  Outcome: Progressing  Intervention: Monitor Pain and Promote Comfort  Recent Flowsheet Documentation  Taken 4/30/2024 1614 by Carey Martinez, RN  Pain Management Interventions: quiet environment facilitated  Goal: Readiness for Transition of Care  Outcome: Progressing         Plan of Care Reviewed With: patient    Overall Patient Progress: improvingOverall Patient Progress: improving

## 2024-05-31 NOTE — DISCHARGE SUMMARY
[facility]  Hospitalist Discharge Summary      Date of Admission:  4/27/2024  Date of Discharge:  5/1/2024 12:28 PM  Discharging Provider: Nacho Bae MD  Discharge Service: Hospitalist Service    Discharge Diagnoses   AGMA, now resolved with IV fluids  Mild lactic acidosis, resolved  Alcoholic vs starvation ketosis, resolved  Tachycardia, unspecified, resolved.    History of atrial fibrillation s/p watchman device  Mechanical fall complicated displaced left clavicular fracture   Transaminitis  Alcohol use disorder  Hypertension   Hypomagnesemia   Breast cancer: s/p lumpectomy   Memory loss   Urinary incontinence   Erosive gastropathy   Dizziness   MDD   Thrombocytopenia     Clinically Significant Risk Factors          Follow-ups Needed After Discharge   Follow-up Appointments     Follow Up and recommended labs and tests      Follow up with primary care provider in 1 weeks for hospital follow up          Discharge Disposition   Discharged to short-term care facility  Condition at discharge: Stable    Hospital Course   Rizwana Aguilar is a 73 year old female with PMH significant for paroxysymal afib not on anticoagulation, HTN, alcohol use disorder, alcoholic liver disease, pancreatitis, breast cancer, memory loss, erosive gastropathy admitted on 4/27/2024 after fall with left shoulder pain. She tripped over a stair resulting in displaced left clavicular fracture in the setting of significant alcohol use for several weeks prior to admission. She received nonoperative management and will required TCU for physical rehab.     On admission, she was also noted to have anion gap metabolic acidosis 2/2 lactic acidosis and ketosis (alcoholic vs starvation). These improved with fluids and nutrition.She also had alcohol related transaminitis. Recommend alcohol cessation.    Consultations This Hospital Stay   ORTHOPEDIC SURGERY IP CONSULT  PHYSICAL THERAPY ADULT IP CONSULT  OCCUPATIONAL THERAPY ADULT IP  CONSULT  CARE MANAGEMENT / SOCIAL WORK IP CONSULT  PHYSICAL THERAPY ADULT IP CONSULT  OCCUPATIONAL THERAPY ADULT IP CONSULT    Code Status   Full Code    Time Spent on this Encounter   I, Nacho Bae MD, personally saw the patient today and spent less than or equal to 30 minutes discharging this patient.       Nacho Bae MD  [facility] ORTHO [facility]INE  [address]  [facility] [address]  Phone: [phone]  Fax: [phone]  ______________________________________________________________________    Physical Exam   General: Very pleasant female resting comfortably in hospital bed.  Awake, alert, interactive.   HEENT: Normocephalic, atraumatic.  PERRL, EOMI.  Conjunctiva clear, sclerae anicteric.  Mucous membranes moist.  Respiratory: Normal work of breathing.    Musculoskeletal: Moving all extremities appropriately. Protecting / immobilizing left arm.  Skin: No rashes or abrasions on exposed skin.  Neurologic: Alert and oriented x4.  Cranial nerves II through XII grossly intact.  Psychologic: Appropriate mood and affect.       Primary Care Physician   Kenyatta Slaughter    Discharge Orders      General info for SNF    Length of Stay Estimate: Short Term Care: Estimated # of Days <30  Condition at Discharge: Stable  Level of care:skilled   Rehabilitation Potential: Good  Admission H&P remains valid and up-to-date: Yes  Recent Chemotherapy: N/A  Use Nursing Home Standing Orders: Yes     Reason for your hospital stay    You were admitted to the hospital after a fall.     Activity - Up with assistive device     Activity - Up with nursing assistance     Follow Up and recommended labs and tests    Follow up with primary care provider in 1 weeks for hospital follow up     Full Code     Physical Therapy Adult Consult    Evaluate and treat as clinically indicated.    Reason:  Gait instability     Occupational Therapy Adult Consult    Evaluate and treat as clinically indicated.    Reason:  ADLs     Fall  precautions     Neck/Shoulder/Back/Abdomen Bracing Supplies Order Sling; Left    Bracing Documentation:   Patient requires the use of the ordered bracing device due to following medical need/condition: clavicle fx.    I, the undersigned, certify that the above prescribed supplies are medically necessary for this patient and is both reasonable and necessary in reference to accepted standards of medical and necessary in reference to accepted standards of medical practice in the treatment of this patient's condition and is not prescribed as a convenience.     Diet    Follow this diet upon discharge: Orders Placed This Encounter      Combination Diet Regular Diet Adult       Significant Results and Procedures   Most Recent 3 CBC's:  Recent Labs   Lab Test 04/30/24  0723 04/29/24  0757 04/28/24  0732 04/27/24  1333   WBC  --  3.6* 3.3* 7.5   HGB  --  11.2* 12.0 14.9   MCV  --  107* 105* 104*   * 110* 128* 193     Most Recent 3 BMP's:  Recent Labs   Lab Test 05/01/24  0708 04/30/24  0723 04/29/24  0757 04/28/24  1404 04/28/24  0732 04/27/24  1851 04/27/24  1333   NA  --   --   --   --  137 132* 140   POTASSIUM 3.8 3.6 3.5   < > 3.4 4.3 4.2   CHLORIDE  --   --   --   --  101 94* 94*   CO2  --   --   --   --  22 16* 15*   BUN  --   --   --   --  5.3* 5.6* 5.7*   CR  --  0.49*  --   --  0.58 0.59 0.63   ANIONGAP  --   --   --   --  14 22* 31*   KHUSHI  --   --   --   --  9.0 9.2 10.3*   GLC  --   --   --   --  147* 111* 116*    < > = values in this interval not displayed.     Most Recent 2 LFT's:  Recent Labs   Lab Test 04/28/24  0732 04/27/24  1333   AST 93* 168*   ALT 57* 96*   ALKPHOS 75 106   BILITOTAL 0.8 0.6   ,   Results for orders placed or performed during the hospital encounter of 04/27/24   Head CT w/o contrast    Narrative    EXAM: CT HEAD W/O CONTRAST  LOCATION: [facility]  DATE: 4/27/2024    INDICATION: Fall, head injury.  COMPARISON: None.  TECHNIQUE: Routine CT Head without IV  contrast. Multiplanar reformats. Dose reduction techniques were used.    FINDINGS:  No evidence of hemorrhage, mass, or hydrocephalus. Volume loss with background of nonspecific white matter hypoattenuation likely representing chronic small vessel ischemic change. No acute osseous abnormality.      Impression    IMPRESSION:  1.  No acute intracranial abnormality.   Chest XR,  PA & LAT    Narrative    EXAM: XR CHEST 2 VIEWS  LOCATION: [facility]  DATE: 4/27/2024    INDICATION: fall  COMPARISON: None.      Impression    IMPRESSION: No acute cardiopulmonary abnormality. Comminuted fracture of the left distal clavicle, as seen on dedicated left shoulder radiographs. Left atrial appendage occlusion device.   XR Shoulder Left G/E 3 Views    Narrative    EXAM: XR SHOULDER LEFT G/E 3 VIEWS  LOCATION: [facility]  DATE: 4/27/2024    INDICATION: Left shoulder pain after a fall.  COMPARISON: None.      Impression    IMPRESSION: Comminuted and mildly displaced left clavicle fracture, involving the lateral one third, extending to the acromioclavicular joint. Fracture lines are longitudinally oriented, extending over 6 cm in length. Fracture displacement measures up to   1.1 cm. The acromial clavicular joint remains normally aligned, although the fracture does extend into the joint. The glenohumeral joint is normal. Bones appear demineralized.       Discharge Medications   Discharge Medication List as of 5/1/2024 10:58 AM        START taking these medications    Details   !! gabapentin (NEURONTIN) 100 MG capsule Take 1 capsule (100 mg) by mouth every 8 hours, Transitional      !! gabapentin (NEURONTIN) 300 MG capsule Take 2 capsules (600 mg) by mouth every 8 hours for 2 days, THEN 1 capsule (300 mg) every 8 hours for 2 days., Transitional      !! melatonin 5 MG tablet Take 1 tablet (5 mg) by mouth every evening as needed for sleep, Transitional      multivitamin w/minerals  (THERA-VIT-M) tablet Take 1 tablet by mouth daily, Transitional      ondansetron (ZOFRAN ODT) 4 MG ODT tab Take 1 tablet (4 mg) by mouth every 6 hours as needed for nausea or vomiting, Transitional      senna-docusate (SENOKOT-S/PERICOLACE) 8.6-50 MG tablet Take 1-2 tablets by mouth 2 times daily as needed for constipation, Transitional      thiamine (B-1) 100 MG tablet Take 1 tablet (100 mg) by mouth daily, Transitional       !! - Potential duplicate medications found. Please discuss with provider.        CONTINUE these medications which have CHANGED    Details   folic acid (FOLVITE) 1 MG tablet Take 1 tablet (1 mg) by mouth daily, Transitional           CONTINUE these medications which have NOT CHANGED    Details   aspirin 81 MG EC tablet Take 81 mg by mouth daily, Historical      diltiazem ER (DILT-XR) 120 MG 24 hr capsule Take 120 mg by mouth daily, Historical      !! melatonin 5 MG tablet Take 5 mg by mouth nightly as needed for sleep, Historical      metoprolol succinate ER (TOPROL XL) 50 MG 24 hr tablet Take 50 mg by mouth daily, Historical      omeprazole (PRILOSEC) 20 MG DR capsule Take 20 mg by mouth 2 times daily, Historical       !! - Potential duplicate medications found. Please discuss with provider.        STOP taking these medications       acetaminophen (TYLENOL) 325 MG tablet Comments:   Reason for Stopping:         calcium carbonate (OS-KHUSHI) 500 MG tablet Comments:   Reason for Stopping:         clopidogrel (PLAVIX) 75 MG tablet Comments:   Reason for Stopping:         dimenhyDRINATE (DRAMAMINE) 50 MG tablet Comments:   Reason for Stopping:         lisinopril (ZESTRIL) 10 MG tablet Comments:   Reason for Stopping:         lisinopril (ZESTRIL) 20 MG tablet Comments:   Reason for Stopping:         meclizine (ANTIVERT) 25 MG tablet Comments:   Reason for Stopping:         vitamin C with B complex (B COMPLEX-C) tablet Comments:   Reason for Stopping:             Allergies   Allergies   Allergen  Reactions    Penicillins Unknown

## 2024-06-15 ENCOUNTER — HOSPITAL ENCOUNTER (INPATIENT)
Facility: CLINIC | Age: 74
LOS: 5 days | Discharge: HOME HEALTH | DRG: 309 | End: 2024-06-20
Attending: EMERGENCY MEDICINE | Admitting: INTERNAL MEDICINE
Payer: COMMERCIAL

## 2024-06-15 DIAGNOSIS — I48.91 ATRIAL FIBRILLATION WITH RAPID VENTRICULAR RESPONSE (H): ICD-10-CM

## 2024-06-15 DIAGNOSIS — F10.930 ALCOHOL WITHDRAWAL SYNDROME WITHOUT COMPLICATION (H): Primary | ICD-10-CM

## 2024-06-15 DIAGNOSIS — E87.29 HIGH ANION GAP METABOLIC ACIDOSIS: ICD-10-CM

## 2024-06-15 LAB
ABO/RH(D): NORMAL
ALBUMIN SERPL BCG-MCNC: 4.2 G/DL (ref 3.5–5.2)
ALP SERPL-CCNC: 129 U/L (ref 40–150)
ALT SERPL W P-5'-P-CCNC: 65 U/L (ref 0–50)
AMMONIA PLAS-SCNC: 21 UMOL/L (ref 11–51)
ANION GAP SERPL CALCULATED.3IONS-SCNC: 27 MMOL/L (ref 7–15)
ANION GAP SERPL CALCULATED.3IONS-SCNC: 31 MMOL/L (ref 7–15)
ANTIBODY SCREEN: NEGATIVE
AST SERPL W P-5'-P-CCNC: 87 U/L (ref 0–45)
B-OH-BUTYR SERPL-SCNC: >9 MMOL/L
BASOPHILS # BLD AUTO: 0.1 10E3/UL (ref 0–0.2)
BASOPHILS NFR BLD AUTO: 1 %
BILIRUB SERPL-MCNC: 0.8 MG/DL
BUN SERPL-MCNC: 6 MG/DL (ref 8–23)
BUN SERPL-MCNC: 6.7 MG/DL (ref 8–23)
CALCIUM SERPL-MCNC: 9.5 MG/DL (ref 8.8–10.2)
CALCIUM SERPL-MCNC: 9.7 MG/DL (ref 8.8–10.2)
CHLORIDE SERPL-SCNC: 95 MMOL/L (ref 98–107)
CHLORIDE SERPL-SCNC: 97 MMOL/L (ref 98–107)
CREAT SERPL-MCNC: 0.54 MG/DL (ref 0.51–0.95)
CREAT SERPL-MCNC: 0.69 MG/DL (ref 0.51–0.95)
DEPRECATED HCO3 PLAS-SCNC: 10 MMOL/L (ref 22–29)
DEPRECATED HCO3 PLAS-SCNC: 12 MMOL/L (ref 22–29)
EGFRCR SERPLBLD CKD-EPI 2021: >90 ML/MIN/1.73M2
EGFRCR SERPLBLD CKD-EPI 2021: >90 ML/MIN/1.73M2
EOSINOPHIL # BLD AUTO: 0.1 10E3/UL (ref 0–0.7)
EOSINOPHIL NFR BLD AUTO: 1 %
ERYTHROCYTE [DISTWIDTH] IN BLOOD BY AUTOMATED COUNT: 13.3 % (ref 10–15)
ETHANOL SERPL-MCNC: <0.01 G/DL
GLUCOSE SERPL-MCNC: 127 MG/DL (ref 70–99)
GLUCOSE SERPL-MCNC: 197 MG/DL (ref 70–99)
HCT VFR BLD AUTO: 40.7 % (ref 35–47)
HGB BLD-MCNC: 13.8 G/DL (ref 11.7–15.7)
HOLD SPECIMEN: NORMAL
IMM GRANULOCYTES # BLD: 0.1 10E3/UL
IMM GRANULOCYTES NFR BLD: 1 %
LACTATE SERPL-SCNC: 1.7 MMOL/L (ref 0.7–2)
LYMPHOCYTES # BLD AUTO: 1.7 10E3/UL (ref 0.8–5.3)
LYMPHOCYTES NFR BLD AUTO: 26 %
MAGNESIUM SERPL-MCNC: 1.4 MG/DL (ref 1.7–2.3)
MAGNESIUM SERPL-MCNC: 1.5 MG/DL (ref 1.7–2.3)
MCH RBC QN AUTO: 34.4 PG (ref 26.5–33)
MCHC RBC AUTO-ENTMCNC: 33.9 G/DL (ref 31.5–36.5)
MCV RBC AUTO: 102 FL (ref 78–100)
MONOCYTES # BLD AUTO: 0.8 10E3/UL (ref 0–1.3)
MONOCYTES NFR BLD AUTO: 12 %
NEUTROPHILS # BLD AUTO: 3.7 10E3/UL (ref 1.6–8.3)
NEUTROPHILS NFR BLD AUTO: 59 %
NRBC # BLD AUTO: 0 10E3/UL
NRBC BLD AUTO-RTO: 0 /100
PHOSPHATE SERPL-MCNC: 2 MG/DL (ref 2.5–4.5)
PLATELET # BLD AUTO: 203 10E3/UL (ref 150–450)
POTASSIUM SERPL-SCNC: 3.2 MMOL/L (ref 3.4–5.3)
POTASSIUM SERPL-SCNC: 3.5 MMOL/L (ref 3.4–5.3)
PROT SERPL-MCNC: 7.5 G/DL (ref 6.4–8.3)
RBC # BLD AUTO: 4.01 10E6/UL (ref 3.8–5.2)
SODIUM SERPL-SCNC: 136 MMOL/L (ref 135–145)
SODIUM SERPL-SCNC: 136 MMOL/L (ref 135–145)
SPECIMEN EXPIRATION DATE: NORMAL
TROPONIN T SERPL HS-MCNC: 9 NG/L
TSH SERPL DL<=0.005 MIU/L-ACNC: 1.85 UIU/ML (ref 0.3–4.2)
WBC # BLD AUTO: 6.3 10E3/UL (ref 4–11)

## 2024-06-15 PROCEDURE — 250N000011 HC RX IP 250 OP 636: Mod: JZ | Performed by: EMERGENCY MEDICINE

## 2024-06-15 PROCEDURE — 86900 BLOOD TYPING SEROLOGIC ABO: CPT | Performed by: EMERGENCY MEDICINE

## 2024-06-15 PROCEDURE — 80053 COMPREHEN METABOLIC PANEL: CPT | Performed by: EMERGENCY MEDICINE

## 2024-06-15 PROCEDURE — 250N000013 HC RX MED GY IP 250 OP 250 PS 637: Performed by: INTERNAL MEDICINE

## 2024-06-15 PROCEDURE — 82140 ASSAY OF AMMONIA: CPT | Performed by: INTERNAL MEDICINE

## 2024-06-15 PROCEDURE — 80320 DRUG SCREEN QUANTALCOHOLS: CPT | Performed by: EMERGENCY MEDICINE

## 2024-06-15 PROCEDURE — 82310 ASSAY OF CALCIUM: CPT | Performed by: INTERNAL MEDICINE

## 2024-06-15 PROCEDURE — 99223 1ST HOSP IP/OBS HIGH 75: CPT | Performed by: INTERNAL MEDICINE

## 2024-06-15 PROCEDURE — 96361 HYDRATE IV INFUSION ADD-ON: CPT

## 2024-06-15 PROCEDURE — 36415 COLL VENOUS BLD VENIPUNCTURE: CPT | Performed by: INTERNAL MEDICINE

## 2024-06-15 PROCEDURE — 85025 COMPLETE CBC W/AUTO DIFF WBC: CPT | Performed by: EMERGENCY MEDICINE

## 2024-06-15 PROCEDURE — 250N000009 HC RX 250: Performed by: EMERGENCY MEDICINE

## 2024-06-15 PROCEDURE — 36415 COLL VENOUS BLD VENIPUNCTURE: CPT | Performed by: EMERGENCY MEDICINE

## 2024-06-15 PROCEDURE — 83735 ASSAY OF MAGNESIUM: CPT | Performed by: INTERNAL MEDICINE

## 2024-06-15 PROCEDURE — 87040 BLOOD CULTURE FOR BACTERIA: CPT | Performed by: INTERNAL MEDICINE

## 2024-06-15 PROCEDURE — 87040 BLOOD CULTURE FOR BACTERIA: CPT | Performed by: EMERGENCY MEDICINE

## 2024-06-15 PROCEDURE — 99291 CRITICAL CARE FIRST HOUR: CPT | Mod: 25

## 2024-06-15 PROCEDURE — 120N000013 HC R&B IMCU

## 2024-06-15 PROCEDURE — 96375 TX/PRO/DX INJ NEW DRUG ADDON: CPT

## 2024-06-15 PROCEDURE — 84100 ASSAY OF PHOSPHORUS: CPT | Performed by: INTERNAL MEDICINE

## 2024-06-15 PROCEDURE — 84443 ASSAY THYROID STIM HORMONE: CPT | Performed by: INTERNAL MEDICINE

## 2024-06-15 PROCEDURE — 250N000009 HC RX 250: Performed by: INTERNAL MEDICINE

## 2024-06-15 PROCEDURE — 83735 ASSAY OF MAGNESIUM: CPT | Performed by: EMERGENCY MEDICINE

## 2024-06-15 PROCEDURE — 82077 ASSAY SPEC XCP UR&BREATH IA: CPT | Performed by: EMERGENCY MEDICINE

## 2024-06-15 PROCEDURE — 258N000003 HC RX IP 258 OP 636: Mod: JZ | Performed by: INTERNAL MEDICINE

## 2024-06-15 PROCEDURE — 96376 TX/PRO/DX INJ SAME DRUG ADON: CPT

## 2024-06-15 PROCEDURE — 258N000003 HC RX IP 258 OP 636: Mod: JZ | Performed by: EMERGENCY MEDICINE

## 2024-06-15 PROCEDURE — 84484 ASSAY OF TROPONIN QUANT: CPT | Performed by: EMERGENCY MEDICINE

## 2024-06-15 PROCEDURE — 83605 ASSAY OF LACTIC ACID: CPT | Performed by: EMERGENCY MEDICINE

## 2024-06-15 PROCEDURE — 82010 KETONE BODYS QUAN: CPT | Performed by: EMERGENCY MEDICINE

## 2024-06-15 PROCEDURE — 96374 THER/PROPH/DIAG INJ IV PUSH: CPT

## 2024-06-15 RX ORDER — MECLIZINE HCL 25MG 25 MG/1
50 TABLET, CHEWABLE ORAL 2 TIMES DAILY PRN
COMMUNITY

## 2024-06-15 RX ORDER — PANTOPRAZOLE SODIUM 40 MG/1
40 TABLET, DELAYED RELEASE ORAL
Status: DISCONTINUED | OUTPATIENT
Start: 2024-06-15 | End: 2024-06-20 | Stop reason: HOSPADM

## 2024-06-15 RX ORDER — ACETAMINOPHEN 325 MG/1
650 TABLET ORAL EVERY 4 HOURS PRN
Status: DISCONTINUED | OUTPATIENT
Start: 2024-06-15 | End: 2024-06-20 | Stop reason: HOSPADM

## 2024-06-15 RX ORDER — ONDANSETRON 2 MG/ML
4 INJECTION INTRAMUSCULAR; INTRAVENOUS EVERY 6 HOURS PRN
Status: DISCONTINUED | OUTPATIENT
Start: 2024-06-15 | End: 2024-06-20 | Stop reason: HOSPADM

## 2024-06-15 RX ORDER — POTASSIUM CHLORIDE 1500 MG/1
40 TABLET, EXTENDED RELEASE ORAL ONCE
Status: COMPLETED | OUTPATIENT
Start: 2024-06-15 | End: 2024-06-15

## 2024-06-15 RX ORDER — AMOXICILLIN 250 MG
2 CAPSULE ORAL 2 TIMES DAILY PRN
Status: DISCONTINUED | OUTPATIENT
Start: 2024-06-15 | End: 2024-06-20 | Stop reason: HOSPADM

## 2024-06-15 RX ORDER — MAGNESIUM SULFATE HEPTAHYDRATE 40 MG/ML
4 INJECTION, SOLUTION INTRAVENOUS ONCE
Status: COMPLETED | OUTPATIENT
Start: 2024-06-15 | End: 2024-06-15

## 2024-06-15 RX ORDER — ONDANSETRON 4 MG/1
4 TABLET, ORALLY DISINTEGRATING ORAL EVERY 6 HOURS PRN
Status: DISCONTINUED | OUTPATIENT
Start: 2024-06-15 | End: 2024-06-20 | Stop reason: HOSPADM

## 2024-06-15 RX ORDER — LIDOCAINE 40 MG/G
CREAM TOPICAL
Status: DISCONTINUED | OUTPATIENT
Start: 2024-06-15 | End: 2024-06-20 | Stop reason: HOSPADM

## 2024-06-15 RX ORDER — CALCIUM CARBONATE 500 MG/1
1000 TABLET, CHEWABLE ORAL 4 TIMES DAILY PRN
Status: DISCONTINUED | OUTPATIENT
Start: 2024-06-15 | End: 2024-06-20 | Stop reason: HOSPADM

## 2024-06-15 RX ORDER — FAMOTIDINE 20 MG/1
20 TABLET, FILM COATED ORAL 2 TIMES DAILY
Status: CANCELLED | OUTPATIENT
Start: 2024-06-15

## 2024-06-15 RX ORDER — DILTIAZEM HCL/D5W 125 MG/125
5-15 PLASTIC BAG, INJECTION (ML) INTRAVENOUS CONTINUOUS
Status: DISCONTINUED | OUTPATIENT
Start: 2024-06-15 | End: 2024-06-15

## 2024-06-15 RX ORDER — LANOLIN ALCOHOL/MO/W.PET/CERES
3 CREAM (GRAM) TOPICAL
Status: DISCONTINUED | OUTPATIENT
Start: 2024-06-15 | End: 2024-06-16

## 2024-06-15 RX ORDER — DILTIAZEM HYDROCHLORIDE 5 MG/ML
10 INJECTION INTRAVENOUS
Status: COMPLETED | OUTPATIENT
Start: 2024-06-15 | End: 2024-06-15

## 2024-06-15 RX ORDER — ADENOSINE 3 MG/ML
INJECTION, SOLUTION INTRAVENOUS
Status: COMPLETED
Start: 2024-06-15 | End: 2024-06-15

## 2024-06-15 RX ORDER — ADENOSINE 3 MG/ML
6 INJECTION, SOLUTION INTRAVENOUS ONCE
Status: COMPLETED | OUTPATIENT
Start: 2024-06-15 | End: 2024-06-15

## 2024-06-15 RX ORDER — ACETAMINOPHEN 650 MG/1
650 SUPPOSITORY RECTAL EVERY 4 HOURS PRN
Status: DISCONTINUED | OUTPATIENT
Start: 2024-06-15 | End: 2024-06-20 | Stop reason: HOSPADM

## 2024-06-15 RX ORDER — FOLIC ACID 1 MG/1
1 TABLET ORAL DAILY
Status: DISCONTINUED | OUTPATIENT
Start: 2024-06-15 | End: 2024-06-20 | Stop reason: HOSPADM

## 2024-06-15 RX ORDER — AMOXICILLIN 250 MG
1 CAPSULE ORAL 2 TIMES DAILY PRN
Status: DISCONTINUED | OUTPATIENT
Start: 2024-06-15 | End: 2024-06-20 | Stop reason: HOSPADM

## 2024-06-15 RX ORDER — SODIUM CHLORIDE 9 MG/ML
INJECTION, SOLUTION INTRAVENOUS CONTINUOUS
Status: DISCONTINUED | OUTPATIENT
Start: 2024-06-15 | End: 2024-06-16

## 2024-06-15 RX ORDER — MAGNESIUM OXIDE 400 MG/1
400 TABLET ORAL DAILY
COMMUNITY

## 2024-06-15 RX ORDER — MULTIPLE VITAMINS W/ MINERALS TAB 9MG-400MCG
1 TAB ORAL DAILY
Status: DISCONTINUED | OUTPATIENT
Start: 2024-06-15 | End: 2024-06-20 | Stop reason: HOSPADM

## 2024-06-15 RX ORDER — DILTIAZEM HCL/D5W 125 MG/125
5-15 PLASTIC BAG, INJECTION (ML) INTRAVENOUS CONTINUOUS
Status: DISCONTINUED | OUTPATIENT
Start: 2024-06-15 | End: 2024-06-16

## 2024-06-15 RX ORDER — METOPROLOL TARTRATE 1 MG/ML
5 INJECTION, SOLUTION INTRAVENOUS ONCE
Status: COMPLETED | OUTPATIENT
Start: 2024-06-15 | End: 2024-06-15

## 2024-06-15 RX ORDER — METOPROLOL TARTRATE 25 MG/1
25 TABLET, FILM COATED ORAL EVERY 6 HOURS
Status: DISCONTINUED | OUTPATIENT
Start: 2024-06-15 | End: 2024-06-17

## 2024-06-15 RX ADMIN — Medication 5 MG/HR: at 13:49

## 2024-06-15 RX ADMIN — DILTIAZEM HYDROCHLORIDE 10 MG: 5 INJECTION, SOLUTION INTRAVENOUS at 12:10

## 2024-06-15 RX ADMIN — DILTIAZEM HYDROCHLORIDE 10 MG: 5 INJECTION, SOLUTION INTRAVENOUS at 12:58

## 2024-06-15 RX ADMIN — Medication 3 MG: at 20:41

## 2024-06-15 RX ADMIN — THIAMINE HCL TAB 100 MG 100 MG: 100 TAB at 18:53

## 2024-06-15 RX ADMIN — SODIUM PHOSPHATE, MONOBASIC, MONOHYDRATE AND SODIUM PHOSPHATE, DIBASIC, ANHYDROUS 9 MMOL: 142; 276 INJECTION, SOLUTION INTRAVENOUS at 18:55

## 2024-06-15 RX ADMIN — SODIUM CHLORIDE 1000 ML: 9 INJECTION, SOLUTION INTRAVENOUS at 12:09

## 2024-06-15 RX ADMIN — SODIUM CHLORIDE: 9 INJECTION, SOLUTION INTRAVENOUS at 22:20

## 2024-06-15 RX ADMIN — PANTOPRAZOLE SODIUM 40 MG: 40 TABLET, DELAYED RELEASE ORAL at 18:54

## 2024-06-15 RX ADMIN — METOPROLOL TARTRATE 25 MG: 25 TABLET, FILM COATED ORAL at 18:54

## 2024-06-15 RX ADMIN — Medication 1 TABLET: at 18:54

## 2024-06-15 RX ADMIN — METOPROLOL TARTRATE 5 MG: 5 INJECTION INTRAVENOUS at 15:12

## 2024-06-15 RX ADMIN — MAGNESIUM SULFATE HEPTAHYDRATE 4 G: 4 INJECTION, SOLUTION INTRAVENOUS at 14:47

## 2024-06-15 RX ADMIN — ADENOSINE 6 MG: 3 INJECTION, SOLUTION INTRAVENOUS at 12:10

## 2024-06-15 RX ADMIN — DILTIAZEM HYDROCHLORIDE 10 MG: 5 INJECTION, SOLUTION INTRAVENOUS at 13:36

## 2024-06-15 ASSESSMENT — ACTIVITIES OF DAILY LIVING (ADL)
ADLS_ACUITY_SCORE: 38
ADLS_ACUITY_SCORE: 38
ADLS_ACUITY_SCORE: 31
ADLS_ACUITY_SCORE: 31
ADLS_ACUITY_SCORE: 38
ADLS_ACUITY_SCORE: 31
ADLS_ACUITY_SCORE: 38

## 2024-06-15 NOTE — PHARMACY-ADMISSION MEDICATION HISTORY
Pharmacy Intern Admission Medication History    Admission medication history is complete. The information provided in this note is only as accurate as the sources available at the time of the update.    Information Source(s): Family member and CareEverywhere/SureScripts via phone    Pertinent Information: Med list was collcted from pt's spouse via phone    Changes made to PTA medication list:  Added: Mg Oxide, Meclizine, B-complex  Deleted: B-1, senna-docusate, Zofran, multivitamin, gabapentin  Changed: Sometimes takes 2 tablets of Melatonin 5 mg    Allergies reviewed with patient and updates made in EHR: no    Medication History Completed By: Selwyn Torres 6/15/2024 2:59 PM    PTA Med List   Medication Sig Last Dose    aspirin 81 MG EC tablet Take 81 mg by mouth daily 6/14/2024 at -    diltiazem ER (DILT-XR) 120 MG 24 hr capsule Take 120 mg by mouth daily Past Week at -    folic acid (FOLVITE) 1 MG tablet Take 1 tablet (1 mg) by mouth daily Past Week at -    magnesium oxide (MAG-OX) 400 MG tablet Take 400 mg by mouth daily Past Week at -    meclizine 25 MG CHEW Take 50 mg by mouth 2 times daily as needed for dizziness 6/15/2024 at am    melatonin 5 MG tablet Take 5-10 mg by mouth nightly as needed for sleep 6/14/2024 at pm    metoprolol succinate ER (TOPROL XL) 50 MG 24 hr tablet Take 50 mg by mouth daily Past Week at -    omeprazole (PRILOSEC) 20 MG DR capsule Take 20 mg by mouth 2 times daily 6/14/2024 at -    vitamin B-Complex Take 1 tablet by mouth daily 6/14/2024 at -

## 2024-06-15 NOTE — ED PROVIDER NOTES
Emergency Department Note      History of Present Illness     Chief Complaint  Tachycardia    HPI  Rizwana Aguilar is a 73 year old female who presents by EMS for assessment of elevated heart rate.  Patient 73 with a history of paroxysmal A-fib and chronic alcoholism.  Patient was noted to be dizzy and had low blood pressure with a heart rate in the 220s and was brought to the emergency room for assessment.  No clear chest pain on arrival patient sitting upright.  She denies palpitations but is noted to have a heart rate of 216.    Independent Historian  None    Review of External Notes  None  Past Medical History   Medical History and Problem List  Past Medical History:   Diagnosis Date     Hypertension        Medications  aspirin 81 MG EC tablet  diltiazem ER (DILT-XR) 120 MG 24 hr capsule  folic acid (FOLVITE) 1 MG tablet  gabapentin (NEURONTIN) 100 MG capsule  gabapentin (NEURONTIN) 300 MG capsule  melatonin 5 MG tablet  melatonin 5 MG tablet  metoprolol succinate ER (TOPROL XL) 50 MG 24 hr tablet  multivitamin w/minerals (THERA-VIT-M) tablet  omeprazole (PRILOSEC) 20 MG DR capsule  ondansetron (ZOFRAN ODT) 4 MG ODT tab  senna-docusate (SENOKOT-S/PERICOLACE) 8.6-50 MG tablet  thiamine (B-1) 100 MG tablet        Surgical History   Past Surgical History:   Procedure Laterality Date     GYN SURGERY       IR LUMBAR PUNCTURE  1/16/2024     Physical Exam   Patient Vitals for the past 24 hrs:   BP Temp src Pulse Resp Weight   06/15/24 1201 -- Oral (!) 216 -- --   06/15/24 1159 -- -- -- -- 55.7 kg (122 lb 11.2 oz)   06/15/24 1158 (!) 132/97 -- -- 18 --     Physical Exam  HENT:      Head: Normocephalic.      Right Ear: Tympanic membrane normal.      Nose: Nose normal.      Mouth/Throat:      Mouth: Mucous membranes are dry.   Eyes:      Pupils: Pupils are equal, round, and reactive to light.   Cardiovascular:      Rate and Rhythm: Regular rhythm. Tachycardia present.   Pulmonary:      Effort: Pulmonary effort is normal.    Abdominal:      General: Abdomen is flat.   Musculoskeletal:         General: Normal range of motion.   Skin:     General: Skin is warm.      Capillary Refill: Capillary refill takes less than 2 seconds.   Neurological:      General: No focal deficit present.      Mental Status: She is alert.   Psychiatric:         Mood and Affect: Mood normal.       Diagnostics   Lab Results   Labs Ordered and Resulted from Time of ED Arrival to Time of ED Departure   COMPREHENSIVE METABOLIC PANEL - Abnormal       Result Value    Sodium 136      Potassium 3.2 (*)     Carbon Dioxide (CO2) 10 (*)     Anion Gap 31 (*)     Urea Nitrogen 6.7 (*)     Creatinine 0.69      GFR Estimate >90      Calcium 9.7      Chloride 95 (*)     Glucose 197 (*)     Alkaline Phosphatase 129      AST 87 (*)     ALT 65 (*)     Protein Total 7.5      Albumin 4.2      Bilirubin Total 0.8     CBC WITH PLATELETS AND DIFFERENTIAL - Abnormal    WBC Count 6.3      RBC Count 4.01      Hemoglobin 13.8      Hematocrit 40.7       (*)     MCH 34.4 (*)     MCHC 33.9      RDW 13.3      Platelet Count 203      % Neutrophils 59      % Lymphocytes 26      % Monocytes 12      % Eosinophils 1      % Basophils 1      % Immature Granulocytes 1      NRBCs per 100 WBC 0      Absolute Neutrophils 3.7      Absolute Lymphocytes 1.7      Absolute Monocytes 0.8      Absolute Eosinophils 0.1      Absolute Basophils 0.1      Absolute Immature Granulocytes 0.1      Absolute NRBCs 0.0     MAGNESIUM - Abnormal    Magnesium 1.4 (*)    KETONE BETA-HYDROXYBUTYRATE QUANTITATIVE, RAPID - Abnormal    Ketone (Beta-Hydroxybutyrate) Quantitative >9.00 (*)    TROPONIN T, HIGH SENSITIVITY - Normal    Troponin T, High Sensitivity 9     ETHYL ALCOHOL LEVEL - Normal    Alcohol ethyl <0.01     LACTIC ACID WHOLE BLOOD - Normal    Lactic Acid 1.7     TYPE AND SCREEN, ADULT    ABO/RH(D) O POS      Antibody Screen Negative      SPECIMEN EXPIRATION DATE [id number]     ABO/RH TYPE AND SCREEN        Imaging  No orders to display       EKG   ECG taken at 12, ECG read at 1205  Rate 217 bpm. NE interval  ms. QRS duration 74 ms. QT/QTc 204/387 ms. narrow complex complex tachycardia that looks irregular a flutter with rapid ventricular response and/or SVT.    EKG #2  Post adenosine.  Heart rate improved to 139 bpm.  Atrial fibrillation.  QT and QTc within normal limits no ischemic changes noted    Independent Interpretation  None  ED Course    Medications Administered  Medications   folic acid (FOLVITE) tablet 1 mg (1 mg Oral $Given 6/17/24 0813)   lidocaine 1 % 0.1-1 mL (has no administration in time range)   lidocaine (LMX4) cream (has no administration in time range)   sodium chloride (PF) 0.9% PF flush 3 mL (3 mLs Intracatheter Not Given 6/17/24 1525)   sodium chloride (PF) 0.9% PF flush 3 mL (3 mLs Intracatheter $Given 6/17/24 0815)   senna-docusate (SENOKOT-S/PERICOLACE) 8.6-50 MG per tablet 1 tablet (has no administration in time range)     Or   senna-docusate (SENOKOT-S/PERICOLACE) 8.6-50 MG per tablet 2 tablet (has no administration in time range)   calcium carbonate (TUMS) chewable tablet 1,000 mg (has no administration in time range)   acetaminophen (TYLENOL) tablet 650 mg (has no administration in time range)     Or   acetaminophen (TYLENOL) Suppository 650 mg (has no administration in time range)   ondansetron (ZOFRAN ODT) ODT tab 4 mg (has no administration in time range)     Or   ondansetron (ZOFRAN) injection 4 mg (has no administration in time range)   pantoprazole (PROTONIX) EC tablet 40 mg (40 mg Oral $Given 6/17/24 1536)   multivitamin w/minerals (THERA-VIT-M) tablet 1 tablet (1 tablet Oral $Given 6/17/24 0812)   sodium chloride (PF) 0.9% PF flush 3 mL (has no administration in time range)   sodium chloride (PF) 0.9% PF flush 3 mL (3 mLs Intravenous Not Given 6/17/24 1451)   May take oral meds with a sip of water, the morning of IAIN procedure. (has no administration in time range)   HOLD:  Insulin - REGULAR AM of procedure IF diabetic (has no administration in time range)   HOLD: Insulin - RAPID/SHORT acting AM of procedure IF diabetic (has no administration in time range)   HOLD: Oral hypoglycemics AM of procedure IF diabetic (has no administration in time range)   Give   of usual dose of LONG ACTING insulin AM of procedure IF diabetic (has no administration in time range)   thiamine (B-1) injection 500 mg (500 mg Intravenous $Given 6/17/24 1536)     Followed by   thiamine (B-1) injection 250 mg (has no administration in time range)     Followed by   thiamine (B-1) tablet 100 mg (has no administration in time range)   dextrose 5% in lactated ringers + KCl 20 mEq/L infusion ( Intravenous $New Bag 6/17/24 1024)   OLANZapine zydis (zyPREXA) ODT tab 5-10 mg (has no administration in time range)     Or   haloperidol lactate (HALDOL) injection 2.5-5 mg (has no administration in time range)   flumazenil (ROMAZICON) injection 0.2 mg (has no administration in time range)   melatonin tablet 5 mg (5 mg Oral $Given 6/16/24 2027)   gabapentin (NEURONTIN) capsule 900 mg (900 mg Oral $Given 6/17/24 1247)   gabapentin (NEURONTIN) capsule 600 mg (has no administration in time range)   gabapentin (NEURONTIN) capsule 300 mg (has no administration in time range)   gabapentin (NEURONTIN) capsule 100 mg (has no administration in time range)   diazepam (VALIUM) tablet 10 mg (has no administration in time range)     Or   diazepam (VALIUM) injection 5-10 mg (has no administration in time range)   sodium chloride 0.9 % infusion (has no administration in time range)   sodium chloride (PF) 0.9% PF flush 9.5 mL (has no administration in time range)     Or   dextrose 50 % injection 9.5 mL (has no administration in time range)   midazolam (VERSED) injection 0.5 mg (2 mg Intravenous $Given 6/17/24 1055)   fentaNYL (PF) (SUBLIMAZE) injection 25 mcg (50 mcg Intravenous $Given 6/17/24 1055)   naloxone (NARCAN) injection 0.2 mg (has no  administration in time range)   naloxone (NARCAN) injection 0.4 mg (has no administration in time range)   naloxone (NARCAN) injection 0.2 mg (has no administration in time range)   naloxone (NARCAN) injection 0.4 mg (has no administration in time range)   flumazenil (ROMAZICON) injection 0.2 mg (has no administration in time range)   benzocaine 20% (HURRICAINE/TOPEX) 20 % spray (has no administration in time range)   potassium & sodium phosphates (NEUTRA-PHOS) Packet 2 packet (2 packets Oral or Feeding Tube $Given 6/17/24 1535)   metoprolol succinate ER (TOPROL XL) 24 hr tablet 50 mg (has no administration in time range)   diltiazem (CARDIZEM) injection 10 mg (10 mg Intravenous $Given 6/15/24 1336)   sodium chloride 0.9% BOLUS 1,000 mL (0 mLs Intravenous Stopped 6/15/24 1322)   adenosine (ADENOCARD) injection 6 mg (6 mg Intravenous $Given 6/15/24 1210)   magnesium sulfate 4 g in 100 mL sterile water intermittent infusion (0 g Intravenous Stopped 6/15/24 1649)   metoprolol (LOPRESSOR) injection 5 mg (5 mg Intravenous $Given 6/15/24 1512)   potassium chloride cony ER (KLOR-CON M20) CR tablet 40 mEq (40 mEq Oral Not Given 6/15/24 1853)   sodium phosphate 9 mmol in 250 mL NS intermittent infusion (9 mmol Intravenous $New Bag 6/15/24 1855)   potassium chloride cnoy ER (KLOR-CON M20) CR tablet 40 mEq (40 mEq Oral $Given 6/16/24 0906)   potassium chloride cony ER (KLOR-CON M20) CR tablet 20 mEq (20 mEq Oral $Given 6/16/24 1107)   potassium & sodium phosphates (NEUTRA-PHOS) Packet 1 packet (1 packet Oral or Feeding Tube $Given 6/16/24 1622)   perflutren diluted 1mL to 2mL with saline (OPTISON) diluted injection 6 mL (6 mLs Intravenous $Given 6/16/24 0835)   sodium chloride (PF) 0.9% PF flush 10 mL (10 mLs Intravenous $Given 6/16/24 0835)   gabapentin (NEURONTIN) tablet 1,200 mg (1,200 mg Oral $Given 6/16/24 2131)   magnesium sulfate 4 g in 100 mL sterile water intermittent infusion (4 g Intravenous $New Bag 6/17/24 1245)    sodium phosphate 15 mmol in NS 250mL intermittent infusion (15 mmol Intravenous Not Given 6/17/24 1451)   glycopyrrolate (ROBINUL) injection 0.1 mg (0.1 mg Intravenous $Given 6/17/24 1039)   lidocaine (XYLOCAINE) 5 % ointment ( Topical Not Given 6/17/24 1448)     Or   lidocaine (XYLOCAINE) 4 % solution 1.5 mL ( Topical See Alternative 6/17/24 1448)   lidocaine (viscous) (XYLOCAINE) 2 % solution 15 mL (15 mLs Mouth/Throat $Given 6/17/24 1037)   benzocaine 20% (HURRICAINE/TOPEX) 20 % spray 0.5-2 mL (1 mL Mouth/Throat $Given 6/17/24 1054)       Procedures  Procedures     Discussion of Management  Admitting Hospitalist, \    Social Determinants of Health adding to complexity of care  Chronic alcoholism    ED Course     Medical Decision Making / Diagnosis   CMS Diagnoses:   and None    MIPS     None    MDM  Rizwana Aguilar is a 73 year old female who presents by EMS for elevated heart rate and low blood pressure.  On arrival is quite tachycardic but seems more in SVT or a very rapid rhythm.  Hard to rule out rapid A-fib but suspect SVT adenosine given it with response over patient converted to A-fib.  Lab work shows profound acidosis.  Patient has a history of prior anion gap acidosis likely starvation or alcoholic ketoacidosis.  Regardless feel uncomfortable with discharge due to patient was present presenting complaint and abnormal electrolytes continued IV resuscitation as well as D5 half infusion and recommend admission for stabilization care was discussed with the hospitalist and was admitted as an inpatient.    Disposition  The patient was admitted to the hospital.     ICD-10 Codes:    ICD-10-CM    1. Atrial fibrillation with rapid ventricular response (H)  I48.91       2. High anion gap metabolic acidosis  E87.29            Discharge Medications  New Prescriptions    No medications on file         MD Nnamdi Garcia Brian Samuel, MD  06/17/24 5513

## 2024-06-15 NOTE — ED NOTES
[facility]  ED Nurse Handoff Report    ED Chief complaint: Tachycardia  . ED Diagnosis:   Final diagnoses:   Atrial fibrillation with rapid ventricular response (H)   High anion gap metabolic acidosis       Allergies:   Allergies   Allergen Reactions    Penicillins Unknown       Code Status: Full Code    Activity level - Baseline/Home:  independent.  Activity Level - Current:   assist of 1.   Lift room needed: No.   Bariatric: No   Needed: No   Isolation: No.   Infection: Not Applicable.     Respiratory status: Room air    Vital Signs (within 30 minutes):   Vitals:    06/15/24 1426 06/15/24 1427 06/15/24 1428 06/15/24 1430   BP:       Pulse: (!) 165 (!) 163 (!) 165 (!) 161   Resp: 15 10 19 14   Temp:       TempSrc:       SpO2: 100% 100% 100% 100%   Weight:           Cardiac Rhythm:  ,      Pain level:    Patient confused: Yes.   Patient Falls Risk: patient and family education, assistive device/personal items within reach, and activity supervised.   Elimination Status: Has voided pure wic    Patient Report - Initial Complaint: tachycardia, weakness, fatigue  .   Focused Assessment: Rizwana Aguilar is a 73 year old female who presents by EMS for assessment of elevated heart rate.  Patient 73 with a history of paroxysmal A-fib and chronic alcoholism.  Patient was noted to be dizzy and had low blood pressure with a heart rate in the 220s and was brought to the emergency room for assessment.  No clear chest pain on arrival patient sitting upright.  She denies palpitations but is noted to have a heart rate of 216.  BIBA, 18 piv RUE. Unable to obtain BP. HR 220s. Hx ETOH, dailyl 300mL NS given, no BG obtained. A/OX2, confused. ABC otherwise in tact.             1248  Behavioral Health  JJ    Behavioral HealthGeneral Appearance WDL: .WDL except; appearanceGeneral Appearance: unkempt  Behavior WDLBehavior WDL: .WDL except; interactionsInteractions: indifferent  Emotion Mood  WDLEmotion/Mood/Affect WDL: .WDL except; affect; emotion moodAffect: flatEmotion/Mood: other (see comments) (indifferent)  Speech WDLSpeech WDL: .WDL except; speechSpeech: whisper  Thought Process WDLThought Process WDL: .WDL except; judgment and insightJudgment and Insight: denies responsibility; judgment not appropriate to situation  C-SSRS (Recent)Q1 Wished to be Dead (Past Month): noQ2 Suicidal Thoughts (Past Month): noQ6 Suicide Behavior (Lifetime): noLevel of Risk per Screen: no risks indicated  Chemical Abuse/AddictionsAlcohol: Binge; Blackouts; DailyLast Use:: 06/15/24      1248  Cardiac      Cardiac (Adult)Cardiac WDL: .WDL except (HR 220s on arrival., Afib RVR after adenosine and diltiazem. Denies CP or SOB)      1248  Respiratory      RespiratoryAirway WDL: WDL  Respiratory WDLRespiratory WDL: WDL          Abnormal Results:   Labs Ordered and Resulted from Time of ED Arrival to Time of ED Departure   COMPREHENSIVE METABOLIC PANEL - Abnormal       Result Value    Sodium 136      Potassium 3.2 (*)     Carbon Dioxide (CO2) 10 (*)     Anion Gap 31 (*)     Urea Nitrogen 6.7 (*)     Creatinine 0.69      GFR Estimate >90      Calcium 9.7      Chloride 95 (*)     Glucose 197 (*)     Alkaline Phosphatase 129      AST 87 (*)     ALT 65 (*)     Protein Total 7.5      Albumin 4.2      Bilirubin Total 0.8     CBC WITH PLATELETS AND DIFFERENTIAL - Abnormal    WBC Count 6.3      RBC Count 4.01      Hemoglobin 13.8      Hematocrit 40.7       (*)     MCH 34.4 (*)     MCHC 33.9      RDW 13.3      Platelet Count 203      % Neutrophils 59      % Lymphocytes 26      % Monocytes 12      % Eosinophils 1      % Basophils 1      % Immature Granulocytes 1      NRBCs per 100 WBC 0      Absolute Neutrophils 3.7      Absolute Lymphocytes 1.7      Absolute Monocytes 0.8      Absolute Eosinophils 0.1      Absolute Basophils 0.1      Absolute Immature Granulocytes 0.1      Absolute NRBCs 0.0     MAGNESIUM - Abnormal     Magnesium 1.4 (*)    KETONE BETA-HYDROXYBUTYRATE QUANTITATIVE, RAPID - Abnormal    Ketone (Beta-Hydroxybutyrate) Quantitative >9.00 (*)    TROPONIN T, HIGH SENSITIVITY - Normal    Troponin T, High Sensitivity 9     ETHYL ALCOHOL LEVEL - Normal    Alcohol ethyl <0.01     LACTIC ACID WHOLE BLOOD   VOLATILES SCREEN   ETHYLENE GLYCOL   TYPE AND SCREEN, ADULT    ABO/RH(D) O POS      Antibody Screen Negative      SPECIMEN EXPIRATION DATE [id number]     BLOOD CULTURE   BLOOD CULTURE   ABO/RH TYPE AND SCREEN        No orders to display       Treatments provided: labs; see MAR  Family Comments: spouse at bedside then left  OBS brochure/video discussed/provided to patient:  No  ED Medications:   Medications   magnesium sulfate 4 g in 100 mL sterile water intermittent infusion (4 g Intravenous $New Bag 6/15/24 1447)   diltiazem (CARDIZEM) 125 mg in dextrose 5 % 125 mL infusion (10 mg/hr Intravenous Rate/Dose Change 6/15/24 1447)   metoprolol (LOPRESSOR) injection 5 mg (has no administration in time range)   diltiazem (CARDIZEM) injection 10 mg (10 mg Intravenous $Given 6/15/24 1336)   sodium chloride 0.9% BOLUS 1,000 mL (0 mLs Intravenous Stopped 6/15/24 1322)   adenosine (ADENOCARD) injection 6 mg (6 mg Intravenous $Given 6/15/24 1210)       Drips infusing:  No  For the majority of the shift this patient was Yellow.   Interventions performed were redirection, distraction.    Sepsis treatment initiated: No    Cares/treatment/interventions/medications to be completed following ED care: none; pending labs     ED Nurse Name: Mable Benoit RN  2:49 PM  .rhe

## 2024-06-15 NOTE — H&P
Hospitalist Admission   History and Physical    CC:   Dizzy, weakness    HPI:  73 year old female with PMH significant for paroxysymal afib s/p Watchman's device and not on anticoagulation, HTN, alcohol use disorder with daily alcohol use, alcoholic liver disease, pancreatitis, breast cancer, memory loss, erosive gastropathy who presented to the ER today for concerns about dizziness and difficult time getting up/weakness.  No loss of consciousness.  She endorses poor oral intake recently    Patient did not fall today, but she had a recent mechanical fall that resulted in a clavicle fracture that is being treated nonoperatively    Patient admits to daily alcohol use.  She says she has been cutting down on her use recently.  She denies any history of alcohol withdrawal symptoms when she stops drinking    On initial presentation to the ER, she was noted to have a heart rate of 220 bpm.  Other vital signs notable for systolic blood pressure near 130, no fever, no hypoxia    Patient was administered adenosine on presentation to the ER which converted her from what was suspected to be a supraventricular tachycardia by the ER provider into atrial fibrillation.  Patient remains in atrial fibrillation since that time and has a known history of atrial fibrillation previously    Pertinent lab results notable for magnesium 1.4, undetectable alcohol level, normal troponin, unremarkable CBC, anion gap of 31 with bicarbonate of 10, potassium 3.2, ALT 65, AST 87, and ketones greater than 9 with a glucose of 197.    I reviewed the patient's EKG which shows a supraventricular tachycardia, rhythm regular, with heart rate near 220 bpm prior to adenosine administration.  There was no follow-up EKG post adenosine     No imaging reported    Patient was started on IV diltiazem drip in the emergency department for rate control.  She is being admitted to telemetry for anion gap acidosis, tachycardia, and atrial fibrillation with rapid  ventricular response        PMH:  Past Medical History:   Diagnosis Date    Hypertension         Medications:  Current Facility-Administered Medications   Medication Dose Route Frequency Provider Last Rate Last Admin    diltiazem (CARDIZEM) 125 mg in dextrose 5 % 125 mL infusion  5-15 mg/hr Intravenous Continuous Oliver Coto MD        magnesium sulfate 4 g in 100 mL sterile water intermittent infusion  4 g Intravenous Once Oliver Coto MD        metoprolol (LOPRESSOR) injection 5 mg  5 mg Intravenous Once Oliver Coto MD         Current Outpatient Medications   Medication Sig Dispense Refill    aspirin 81 MG EC tablet Take 81 mg by mouth daily      diltiazem ER (DILT-XR) 120 MG 24 hr capsule Take 120 mg by mouth daily      folic acid (FOLVITE) 1 MG tablet Take 1 tablet (1 mg) by mouth daily      gabapentin (NEURONTIN) 100 MG capsule Take 1 capsule (100 mg) by mouth every 8 hours      gabapentin (NEURONTIN) 300 MG capsule Take 2 capsules (600 mg) by mouth every 8 hours for 2 days, THEN 1 capsule (300 mg) every 8 hours for 2 days.      melatonin 5 MG tablet Take 1 tablet (5 mg) by mouth every evening as needed for sleep      melatonin 5 MG tablet Take 5 mg by mouth nightly as needed for sleep      metoprolol succinate ER (TOPROL XL) 50 MG 24 hr tablet Take 50 mg by mouth daily      multivitamin w/minerals (THERA-VIT-M) tablet Take 1 tablet by mouth daily      omeprazole (PRILOSEC) 20 MG DR capsule Take 20 mg by mouth 2 times daily      ondansetron (ZOFRAN ODT) 4 MG ODT tab Take 1 tablet (4 mg) by mouth every 6 hours as needed for nausea or vomiting      senna-docusate (SENOKOT-S/PERICOLACE) 8.6-50 MG tablet Take 1-2 tablets by mouth 2 times daily as needed for constipation      thiamine (B-1) 100 MG tablet Take 1 tablet (100 mg) by mouth daily          Allergies:     Allergies   Allergen Reactions    Penicillins Unknown        Family History:  noncontributory    Social History:       Review of Systems: Comprehensive greater than 10 point review systems otherwise negative besides that detailed above    Physical exam:  BP (!) 151/125   Pulse (!) 144   Temp 97.7  F (36.5  C) (Oral)   Resp 17   Wt 55.7 kg (122 lb 11.2 oz)   SpO2 100%   BMI 19.80 kg/m     PSYCH: pleasant, oriented, No acute distress.  HEART:  Normal S1, S2 with no edema.  LUNGS:  Clear to auscultation, normal Respiratory effort.  ABDOMEN:  Soft, no hepatosplenomegaly, normal bowel sounds.  SKIN:  Dry to touch, No rash.     Pertinent laboratory and imaging data reviewed above in HPI         Impression:  73 year old female with PMH significant for paroxysymal afib s/p Watchman's device and not on anticoagulation, HTN, alcohol use disorder with daily alcohol use, alcoholic liver disease, pancreatitis, breast cancer, memory loss, erosive gastropathy who presented to the ER today for concerns about dizziness and difficult time getting up/weakness.  No loss of consciousness.  She endorses poor oral intake recently    Patient did not fall today, but she had a recent mechanical fall that resulted in a clavicle fracture that is being treated nonoperatively    Patient admits to daily alcohol use.  She says she has been cutting down on her use recently.  She denies any history of alcohol withdrawal symptoms when she stops drinking    On initial presentation to the ER, she was noted to have a heart rate of 220 bpm.  Other vital signs notable for systolic blood pressure near 130, no fever, no hypoxia    Patient was administered adenosine on presentation to the ER which converted her from what was suspected to be a supraventricular tachycardia by the ER provider into atrial fibrillation.  Patient remains in atrial fibrillation since that time and has a known history of atrial fibrillation previously    Pertinent lab results notable for magnesium 1.4, undetectable alcohol level, normal troponin, unremarkable CBC, anion gap of 31 with  bicarbonate of 10, potassium 3.2, ALT 65, AST 87, and ketones greater than 9 with a glucose of 197.    I reviewed the patient's EKG which shows a supraventricular tachycardia, rhythm regular, with heart rate near 220 bpm prior to adenosine administration.  There was no follow-up EKG post adenosine     No imaging reported    Patient was started on IV diltiazem drip in the emergency department for rate control.  She is being admitted to telemetry for anion gap acidosis, tachycardia, and atrial fibrillation with rapid ventricular response    1.  Supraventricular tachycardia  -Noted on presenting EKG in the ER  -Converted to atrial fibrillation after 6 mg IV adenosine dose  -Will obtain repeat EKG  -Monitor on telemetry  -Electrolyte supplementation  -Check TSH  -Obtain echocardiogram  -Could consider Zio patch on discharge for further evaluation    2.  Alcohol use disorder  -Admits to daily alcohol use  -Denies history of alcohol withdrawal  -Does not appear to be in withdrawal currently  -Administer thiamine and folate  -If withdrawal symptoms start would order CIWA protocol  -ER notes indicate confusion on presentation, though when I assessed the patient I did not note this.  Will check ammonia level for completeness    3.  Anion gap acidosis  -Likely due to starvation ketosis from poor oral and chronic ETOH use  - had similar admit in 4/24 with AG acidosis that resolved with IVF hydration  -Patient denies any other ingestions beyond alcohol  -Volatile screen ordered by ER, collection pending  -Lactic acid ordered by ER, collection pending  -Blood cultures ordered by ER, collection pending  -Administer IV fluid  -Repeat basic metabolic panel this afternoon and again in AM    4.  Rapid atrial fibrillation  -Known history in this patient  -His previous watchman's procedure.  Is not on chronic anticoagulation  -Will order metoprolol 25 mg every 6 hours for rate management.  Transition to twice daily dosing when heart  rate better controlled  -Continue diltiazem drip for now with goal heart rate less than 100 bpm.  Stop gtt when HR reaches goal  -Telemetry monitoring  -Echocardiogram ordered      5.  Recent mechanical fall resulting in clavicle fracture, being treated nonoperatively    6.  Hypokalemia and hypomagnesemia  -Likely related to alcohol use  -Supplement protocol as ordered  -Check phosphorus level and supplement if needed        CODE STATUS: Full code

## 2024-06-15 NOTE — ED TRIAGE NOTES
BIBA, 18 piv RUE. Unable to obtain BP. HR 220s. Hx ETOH, dailyl 300mL NS given, no BG obtained. A/OX2, confused. ABC otherwise in tact.

## 2024-06-16 ENCOUNTER — APPOINTMENT (OUTPATIENT)
Dept: CARDIOLOGY | Facility: CLINIC | Age: 74
DRG: 309 | End: 2024-06-16
Attending: INTERNAL MEDICINE
Payer: COMMERCIAL

## 2024-06-16 LAB
ALBUMIN SERPL BCG-MCNC: 3.6 G/DL (ref 3.5–5.2)
ALP SERPL-CCNC: 108 U/L (ref 40–150)
ALT SERPL W P-5'-P-CCNC: 49 U/L (ref 0–50)
ANION GAP SERPL CALCULATED.3IONS-SCNC: 21 MMOL/L (ref 7–15)
AST SERPL W P-5'-P-CCNC: 57 U/L (ref 0–45)
B-OH-BUTYR SERPL-SCNC: 5.5 MMOL/L
BILIRUB SERPL-MCNC: 0.7 MG/DL
BUN SERPL-MCNC: 6.6 MG/DL (ref 8–23)
CALCIUM SERPL-MCNC: 8.9 MG/DL (ref 8.8–10.2)
CHLORIDE SERPL-SCNC: 102 MMOL/L (ref 98–107)
CREAT SERPL-MCNC: 0.49 MG/DL (ref 0.51–0.95)
DEPRECATED HCO3 PLAS-SCNC: 16 MMOL/L (ref 22–29)
EGFRCR SERPLBLD CKD-EPI 2021: >90 ML/MIN/1.73M2
ERYTHROCYTE [DISTWIDTH] IN BLOOD BY AUTOMATED COUNT: 13.4 % (ref 10–15)
GLUCOSE SERPL-MCNC: 104 MG/DL (ref 70–99)
HCT VFR BLD AUTO: 37.3 % (ref 35–47)
HGB BLD-MCNC: 13.2 G/DL (ref 11.7–15.7)
LVEF ECHO: NORMAL
MAGNESIUM SERPL-MCNC: 1.9 MG/DL (ref 1.7–2.3)
MAGNESIUM SERPL-MCNC: 2.2 MG/DL (ref 1.7–2.3)
MCH RBC QN AUTO: 34.7 PG (ref 26.5–33)
MCHC RBC AUTO-ENTMCNC: 35.4 G/DL (ref 31.5–36.5)
MCV RBC AUTO: 98 FL (ref 78–100)
PHOSPHATE SERPL-MCNC: 2.1 MG/DL (ref 2.5–4.5)
PLATELET # BLD AUTO: 146 10E3/UL (ref 150–450)
POTASSIUM SERPL-SCNC: 3 MMOL/L (ref 3.4–5.3)
POTASSIUM SERPL-SCNC: 4.2 MMOL/L (ref 3.4–5.3)
PROT SERPL-MCNC: 6.5 G/DL (ref 6.4–8.3)
RBC # BLD AUTO: 3.8 10E6/UL (ref 3.8–5.2)
SODIUM SERPL-SCNC: 139 MMOL/L (ref 135–145)
WBC # BLD AUTO: 3.9 10E3/UL (ref 4–11)

## 2024-06-16 PROCEDURE — 250N000011 HC RX IP 250 OP 636: Performed by: STUDENT IN AN ORGANIZED HEALTH CARE EDUCATION/TRAINING PROGRAM

## 2024-06-16 PROCEDURE — 83735 ASSAY OF MAGNESIUM: CPT | Performed by: STUDENT IN AN ORGANIZED HEALTH CARE EDUCATION/TRAINING PROGRAM

## 2024-06-16 PROCEDURE — 99233 SBSQ HOSP IP/OBS HIGH 50: CPT | Performed by: STUDENT IN AN ORGANIZED HEALTH CARE EDUCATION/TRAINING PROGRAM

## 2024-06-16 PROCEDURE — 84132 ASSAY OF SERUM POTASSIUM: CPT | Performed by: STUDENT IN AN ORGANIZED HEALTH CARE EDUCATION/TRAINING PROGRAM

## 2024-06-16 PROCEDURE — 93306 TTE W/DOPPLER COMPLETE: CPT | Mod: 26 | Performed by: INTERNAL MEDICINE

## 2024-06-16 PROCEDURE — 255N000002 HC RX 255 OP 636: Performed by: INTERNAL MEDICINE

## 2024-06-16 PROCEDURE — 36415 COLL VENOUS BLD VENIPUNCTURE: CPT | Performed by: INTERNAL MEDICINE

## 2024-06-16 PROCEDURE — 258N000001 HC RX 258: Performed by: STUDENT IN AN ORGANIZED HEALTH CARE EDUCATION/TRAINING PROGRAM

## 2024-06-16 PROCEDURE — 85027 COMPLETE CBC AUTOMATED: CPT | Performed by: INTERNAL MEDICINE

## 2024-06-16 PROCEDURE — 250N000013 HC RX MED GY IP 250 OP 250 PS 637: Performed by: INTERNAL MEDICINE

## 2024-06-16 PROCEDURE — 120N000001 HC R&B MED SURG/OB

## 2024-06-16 PROCEDURE — 250N000013 HC RX MED GY IP 250 OP 250 PS 637: Performed by: STUDENT IN AN ORGANIZED HEALTH CARE EDUCATION/TRAINING PROGRAM

## 2024-06-16 PROCEDURE — 999N000208 ECHOCARDIOGRAM COMPLETE

## 2024-06-16 PROCEDURE — 80053 COMPREHEN METABOLIC PANEL: CPT | Performed by: INTERNAL MEDICINE

## 2024-06-16 PROCEDURE — 82010 KETONE BODYS QUAN: CPT | Performed by: STUDENT IN AN ORGANIZED HEALTH CARE EDUCATION/TRAINING PROGRAM

## 2024-06-16 PROCEDURE — 36415 COLL VENOUS BLD VENIPUNCTURE: CPT | Performed by: STUDENT IN AN ORGANIZED HEALTH CARE EDUCATION/TRAINING PROGRAM

## 2024-06-16 PROCEDURE — 84100 ASSAY OF PHOSPHORUS: CPT | Performed by: INTERNAL MEDICINE

## 2024-06-16 PROCEDURE — 258N000003 HC RX IP 258 OP 636: Mod: JZ | Performed by: INTERNAL MEDICINE

## 2024-06-16 RX ORDER — THIAMINE HYDROCHLORIDE 100 MG/ML
500 INJECTION, SOLUTION INTRAMUSCULAR; INTRAVENOUS 3 TIMES DAILY
Status: COMPLETED | OUTPATIENT
Start: 2024-06-16 | End: 2024-06-18

## 2024-06-16 RX ORDER — HALOPERIDOL 5 MG/ML
2.5-5 INJECTION INTRAMUSCULAR EVERY 6 HOURS PRN
Status: DISCONTINUED | OUTPATIENT
Start: 2024-06-16 | End: 2024-06-20 | Stop reason: HOSPADM

## 2024-06-16 RX ORDER — GABAPENTIN 100 MG/1
100 CAPSULE ORAL EVERY 8 HOURS
Status: DISCONTINUED | OUTPATIENT
Start: 2024-06-23 | End: 2024-06-20 | Stop reason: HOSPADM

## 2024-06-16 RX ORDER — DIAZEPAM 10 MG
10 TABLET ORAL EVERY 30 MIN PRN
Status: DISCONTINUED | OUTPATIENT
Start: 2024-06-16 | End: 2024-06-18

## 2024-06-16 RX ORDER — DIAZEPAM 10 MG/2ML
5-10 INJECTION, SOLUTION INTRAMUSCULAR; INTRAVENOUS EVERY 30 MIN PRN
Status: DISCONTINUED | OUTPATIENT
Start: 2024-06-16 | End: 2024-06-18

## 2024-06-16 RX ORDER — OLANZAPINE 5 MG/1
5-10 TABLET, ORALLY DISINTEGRATING ORAL EVERY 6 HOURS PRN
Status: DISCONTINUED | OUTPATIENT
Start: 2024-06-16 | End: 2024-06-20 | Stop reason: HOSPADM

## 2024-06-16 RX ORDER — DEXTROSE MONOHYDRATE, SODIUM CHLORIDE, SODIUM LACTATE, POTASSIUM CHLORIDE, CALCIUM CHLORIDE 5; 600; 310; 179; 20 G/100ML; MG/100ML; MG/100ML; MG/100ML; MG/100ML
INJECTION, SOLUTION INTRAVENOUS CONTINUOUS
Status: DISCONTINUED | OUTPATIENT
Start: 2024-06-16 | End: 2024-06-18

## 2024-06-16 RX ORDER — GABAPENTIN 600 MG/1
1200 TABLET ORAL ONCE
Status: COMPLETED | OUTPATIENT
Start: 2024-06-16 | End: 2024-06-16

## 2024-06-16 RX ORDER — FLUMAZENIL 0.1 MG/ML
0.2 INJECTION, SOLUTION INTRAVENOUS
Status: DISCONTINUED | OUTPATIENT
Start: 2024-06-16 | End: 2024-06-18

## 2024-06-16 RX ORDER — GABAPENTIN 300 MG/1
900 CAPSULE ORAL EVERY 8 HOURS
Status: COMPLETED | OUTPATIENT
Start: 2024-06-16 | End: 2024-06-19

## 2024-06-16 RX ORDER — GABAPENTIN 300 MG/1
600 CAPSULE ORAL EVERY 8 HOURS
Status: DISCONTINUED | OUTPATIENT
Start: 2024-06-19 | End: 2024-06-20 | Stop reason: HOSPADM

## 2024-06-16 RX ORDER — THIAMINE HYDROCHLORIDE 100 MG/ML
250 INJECTION, SOLUTION INTRAMUSCULAR; INTRAVENOUS DAILY
Status: DISCONTINUED | OUTPATIENT
Start: 2024-06-19 | End: 2024-06-20 | Stop reason: HOSPADM

## 2024-06-16 RX ORDER — GABAPENTIN 300 MG/1
300 CAPSULE ORAL EVERY 8 HOURS
Status: DISCONTINUED | OUTPATIENT
Start: 2024-06-21 | End: 2024-06-20 | Stop reason: HOSPADM

## 2024-06-16 RX ORDER — POTASSIUM CHLORIDE 1500 MG/1
20 TABLET, EXTENDED RELEASE ORAL ONCE
Status: COMPLETED | OUTPATIENT
Start: 2024-06-16 | End: 2024-06-16

## 2024-06-16 RX ORDER — POTASSIUM CHLORIDE 1500 MG/1
40 TABLET, EXTENDED RELEASE ORAL ONCE
Status: COMPLETED | OUTPATIENT
Start: 2024-06-16 | End: 2024-06-16

## 2024-06-16 RX ADMIN — POTASSIUM & SODIUM PHOSPHATES POWDER PACK 280-160-250 MG 1 PACKET: 280-160-250 PACK at 09:06

## 2024-06-16 RX ADMIN — Medication 1 TABLET: at 09:05

## 2024-06-16 RX ADMIN — THIAMINE HCL TAB 100 MG 100 MG: 100 TAB at 09:05

## 2024-06-16 RX ADMIN — DEXTROSE MONOHYDRATE, SODIUM CHLORIDE, SODIUM LACTATE, POTASSIUM CHLORIDE, CALCIUM CHLORIDE: 5; 600; 310; 179; 20 INJECTION, SOLUTION INTRAVENOUS at 23:34

## 2024-06-16 RX ADMIN — GABAPENTIN 900 MG: 300 CAPSULE ORAL at 20:26

## 2024-06-16 RX ADMIN — POTASSIUM CHLORIDE 40 MEQ: 1500 TABLET, EXTENDED RELEASE ORAL at 09:06

## 2024-06-16 RX ADMIN — PANTOPRAZOLE SODIUM 40 MG: 40 TABLET, DELAYED RELEASE ORAL at 09:03

## 2024-06-16 RX ADMIN — THIAMINE HYDROCHLORIDE 500 MG: 100 INJECTION, SOLUTION INTRAMUSCULAR; INTRAVENOUS at 21:25

## 2024-06-16 RX ADMIN — POTASSIUM & SODIUM PHOSPHATES POWDER PACK 280-160-250 MG 1 PACKET: 280-160-250 PACK at 16:22

## 2024-06-16 RX ADMIN — PANTOPRAZOLE SODIUM 40 MG: 40 TABLET, DELAYED RELEASE ORAL at 16:22

## 2024-06-16 RX ADMIN — DEXTROSE MONOHYDRATE, SODIUM CHLORIDE, SODIUM LACTATE, POTASSIUM CHLORIDE, CALCIUM CHLORIDE: 5; 600; 310; 179; 20 INJECTION, SOLUTION INTRAVENOUS at 13:47

## 2024-06-16 RX ADMIN — HUMAN ALBUMIN MICROSPHERES AND PERFLUTREN 6 ML: 10; .22 INJECTION, SOLUTION INTRAVENOUS at 08:35

## 2024-06-16 RX ADMIN — THIAMINE HYDROCHLORIDE 500 MG: 100 INJECTION, SOLUTION INTRAMUSCULAR; INTRAVENOUS at 16:07

## 2024-06-16 RX ADMIN — FOLIC ACID 1 MG: 1 TABLET ORAL at 09:04

## 2024-06-16 RX ADMIN — POTASSIUM & SODIUM PHOSPHATES POWDER PACK 280-160-250 MG 1 PACKET: 280-160-250 PACK at 13:38

## 2024-06-16 RX ADMIN — METOPROLOL TARTRATE 25 MG: 25 TABLET, FILM COATED ORAL at 09:03

## 2024-06-16 RX ADMIN — GABAPENTIN 1200 MG: 600 TABLET, FILM COATED ORAL at 13:38

## 2024-06-16 RX ADMIN — SODIUM CHLORIDE: 9 INJECTION, SOLUTION INTRAVENOUS at 09:14

## 2024-06-16 RX ADMIN — POTASSIUM CHLORIDE 20 MEQ: 1500 TABLET, EXTENDED RELEASE ORAL at 11:07

## 2024-06-16 RX ADMIN — Medication 5 MG: at 20:27

## 2024-06-16 RX ADMIN — POTASSIUM & SODIUM PHOSPHATES POWDER PACK 280-160-250 MG 1 PACKET: 280-160-250 PACK at 05:18

## 2024-06-16 ASSESSMENT — ACTIVITIES OF DAILY LIVING (ADL)
ADLS_ACUITY_SCORE: 38
ADLS_ACUITY_SCORE: 31
ADLS_ACUITY_SCORE: 37
ADLS_ACUITY_SCORE: 38
ADLS_ACUITY_SCORE: 37
ADLS_ACUITY_SCORE: 37
ADLS_ACUITY_SCORE: 38
ADLS_ACUITY_SCORE: 37
ADLS_ACUITY_SCORE: 31
ADLS_ACUITY_SCORE: 37
ADLS_ACUITY_SCORE: 38
ADLS_ACUITY_SCORE: 37
ADLS_ACUITY_SCORE: 37
ADLS_ACUITY_SCORE: 31
ADLS_ACUITY_SCORE: 38
ADLS_ACUITY_SCORE: 37
ADLS_ACUITY_SCORE: 31
ADLS_ACUITY_SCORE: 38
ADLS_ACUITY_SCORE: 37
ADLS_ACUITY_SCORE: 38
ADLS_ACUITY_SCORE: 37

## 2024-06-16 NOTE — PLAN OF CARE
"Goal Outcome Evaluation:      Plan of Care Reviewed With: patient          Outcome Evaluation: Pt A&O x 4. VSS. Soft BP. Converted to SR. CIWA Sr 0. Denies pain, nausea or SOB.  Tolerating cardiac diet. NPO at midnight. Possible Transesophageal Echocardiogram tomorrow. On elcetrolytes protocols. K+ 3.0 replaced and rechecked 4.2. Mg+ 1.9 redraw in the morning. Phos 2.1 replaced and redraw in the morning. Ketone 5.5 and is trending down. Provider notified. PIV infusing D5% in LR + KCl 20 at 100 mL/h.  Laying comfortably in bed. Call light within reach.      Problem: Adult Inpatient Plan of Care  Goal: Plan of Care Review  Description: The Plan of Care Review/Shift note should be completed every shift.  The Outcome Evaluation is a brief statement about your assessment that the patient is improving, declining, or no change.  This information will be displayed automatically on your shift  note.  Outcome: Progressing  Flowsheets (Taken 6/16/2024 5017)  Outcome Evaluation: Pt A&O x 4. VSS. Soft BP. Converted to SR. CIWA Sr 0. Denies pain, nausea or SOB.  Tolerating cardiac diet. NPO at midnight. Possible Transesophageal Echocardiogram tomorrow. On elcetrolytes protocols. K+ 3.0 replaced and rechecked 4.2. Mg+ 1.9 redraw in the morning. Phos 2.1 replaced and redraw in the morning. Ketone 5.5 and is trending down. Provider notified. PIV infusing D5% in LR + KCl 20 at 100 mL/h.  Laying comfortably in bed. Call light within reach.  Plan of Care Reviewed With: patient  Goal: Patient-Specific Goal (Individualized)  Description: You can add care plan individualizations to a care plan. Examples of Individualization might be:  \"Parent requests to be called daily at 9am for status\", \"I have a hard time hearing out of my right ear\", or \"Do not touch me to wake me up as it startles  me\".  Outcome: Progressing  Goal: Absence of Hospital-Acquired Illness or Injury  Outcome: Progressing  Intervention: Prevent Skin Injury  Recent " Flowsheet Documentation  Taken 6/16/2024 1613 by Keena France RN  Body Position: position changed independently  Taken 6/16/2024 0859 by Keena France RN  Body Position: position changed independently  Intervention: Prevent and Manage VTE (Venous Thromboembolism) Risk  Recent Flowsheet Documentation  Taken 6/16/2024 0859 by Keena France RN  VTE Prevention/Management: SCDs (sequential compression devices) off  Intervention: Prevent Infection  Recent Flowsheet Documentation  Taken 6/16/2024 1613 by Keena France RN  Infection Prevention:   hand hygiene promoted   single patient room provided  Taken 6/16/2024 0859 by Keena France RN  Infection Prevention:   hand hygiene promoted   single patient room provided  Goal: Optimal Comfort and Wellbeing  Outcome: Progressing  Goal: Readiness for Transition of Care  Outcome: Progressing

## 2024-06-16 NOTE — PLAN OF CARE
"Dilt gtt stopped due to HR 48-61. HR improved 60-5-80. Patient has frequent pauses 2.3-2.5. Dilt on hold, MD notified.  Problem: Adult Inpatient Plan of Care  Goal: Plan of Care Review  Description: The Plan of Care Review/Shift note should be completed every shift.  The Outcome Evaluation is a brief statement about your assessment that the patient is improving, declining, or no change.  This information will be displayed automatically on your shift  note.  Outcome: Progressing  Flowsheets (Taken 6/15/2024 2139)  Outcome Evaluation: Metoprolol PO given, HR controlled. Diltiazem gtt stopped due to HR 48-61. HR improved 60-80, patient has frequent pauses 2.3- 2.5. Dilt on hold.  Plan of Care Reviewed With: patient  Overall Patient Progress: improving  Goal: Patient-Specific Goal (Individualized)  Description: You can add care plan individualizations to a care plan. Examples of Individualization might be:  \"Parent requests to be called daily at 9am for status\", \"I have a hard time hearing out of my right ear\", or \"Do not touch me to wake me up as it startles  me\".  Outcome: Progressing  Goal: Absence of Hospital-Acquired Illness or Injury  Outcome: Progressing  Intervention: Identify and Manage Fall Risk  Recent Flowsheet Documentation  Taken 6/15/2024 2026 by Milagro Kan RN  Safety Promotion/Fall Prevention:   activity supervised   assistive device/personal items within reach   clutter free environment maintained   nonskid shoes/slippers when out of bed   supervised activity   toileting scheduled  Intervention: Prevent Skin Injury  Recent Flowsheet Documentation  Taken 6/15/2024 2026 by Milagro Kan RN  Body Position: position changed independently  Device Skin Pressure Protection: absorbent pad utilized/changed  Goal: Optimal Comfort and Wellbeing  Outcome: Progressing     Problem: Dysrhythmia  Goal: Normalized Cardiac Rhythm  Outcome: Progressing     Problem: Skin Injury Risk Increased  Goal: Skin " Health and Integrity  Outcome: Progressing  Intervention: Plan: Nurse Driven Intervention: Moisture Management  Recent Flowsheet Documentation  Taken 6/15/2024 2026 by Milagro Kan, RN  Bathing/Skin Care: incontinence care  Intervention: Optimize Skin Protection  Recent Flowsheet Documentation  Taken 6/15/2024 2026 by Milagro Kan, RN  Activity Management: ambulated to bathroom  Head of Bed (HOB) Positioning: HOB at 30-45 degrees     Problem: Adult Inpatient Plan of Care  Goal: Readiness for Transition of Care  Outcome: Not Progressing  Intervention: Mutually Develop Transition Plan  Recent Flowsheet Documentation  Taken 6/15/2024 2000 by Milagro Kan, RN  Equipment Currently Used at Home: cane, quad   Goal Outcome Evaluation:      Plan of Care Reviewed With: patient    Overall Patient Progress: improvingOverall Patient Progress: improving    Outcome Evaluation: Metoprolol PO given, HR controlled. Diltiazem gtt stopped due to HR 48-61. HR improved 60-80, patient has frequent pauses 2.3- 2.5. Dilt on hold.

## 2024-06-16 NOTE — PROGRESS NOTES
[facility]    Medicine Progress Note - Hospitalist Service    Date of Admission:  6/15/2024    Assessment & Plan     SVT  Atrial fibrillation with RVR  S/p Watchman device placement 7/2022   On presenting EKG in ER, converted to atrial fibrillation after 6 mg IV adenosine. Was on diltiazem gtt, lopressor started with plan to wean off dilt and consolidate metoprolol.  -diltiazem gtt with goal HR <100, stop when HR goal  -lopressor 25 mg q6 h , will consolidate when HR controlled  -TTE with EF 60-65%; does note significant LA dilation, watchman device seen prominently in LA. Discussed with cardiology on call, Dr. Jain, will further characterize with IAIN in AM. IAIN ordered, NPO @ midnight.     Starvation Ketoacidosis  Hypokalemia  Hypomagnesemia  Hypophosphatemia   Likely related to decreased PO intake, alcohol use, likely refeeding.   Nutrition consult  -D5LR with K, 100 ml/hr  -RN managed replacement protocols  -high dose thiamine, folate    Alcohol use disorder  Daily use, no hx withdrawal per patient. She has been cutting down recently.  -CIWA protocol ordered   -lyte and nutrition as above  -addiction medicine consult placed given severity, high risk comorbidities, previously sober in structured living environment       Diet: Low Saturated Fat Na <2400 mg    DVT Prophylaxis: Pneumatic Compression Devices  Billings Catheter: Not present  Lines: None     Cardiac Monitoring: ACTIVE order. Indication: tachycardia  Code Status: Full Code      Clinically Significant Risk Factors Present on Admission        # Hypokalemia: Lowest K = 3 mmol/L in last 2 days, will replace as needed     # Hypomagnesemia: Lowest Mg = 1.4 mg/dL in last 2 days, will replace as needed  # Anion Gap Metabolic Acidosis: Highest Anion Gap = 31 mmol/L in last 2 days, will monitor and treat as appropriate    # Drug Induced Platelet Defect: home medication list includes an antiplatelet medication                           Disposition Plan      Expected Discharge Date: 2024                  Staci Cruz DO  Hospitalist Service  [facility]  Securely message with [employer] (more info)  Text page via [employer] Paging/Directory   ______________________________________________________________________      Physical Exam   Vital Signs: Temp: 98  F (36.7  C) Temp src: Oral BP: 106/62 Pulse: 120   Resp: 16 SpO2: 99 % O2 Device: None (Room air)    Weight: 122 lbs 11.2 oz    General: Very pleasant female , NAD  HEENT:  Sclerae anicteric.  Mucous membranes moist.  Cardiac: irregular, rate controlled.  Respiratory: Normal work of breathing.    GI:  Abdomen soft, nontender, nondistended.  Neurologic: Alert. No tremors  Psychologic: Appropriate mood and affect.      Medical Decision Making       55 MINUTES SPENT BY ME on the date of service doing chart review, history, exam, documentation & further activities per the note.      Data     I have personally reviewed the following data over the past 24 hrs:    3.9 (L)  \   13.2   / 146 (L)     139 102 6.6 (L) /  104 (H)   4.2 16 (L) 0.49 (L) \     ALT: 49 AST: 57 (H) AP: 108 TBILI: 0.7   ALB: 3.6 TOT PROTEIN: 6.5 LIPASE: N/A     TSH: N/A T4: N/A A1C: N/A       Imaging results reviewed over the past 24 hrs:   Recent Results (from the past 24 hour(s))   Echocardiogram Complete   Result Value    LVEF  60-65%    Narrative    [id number]  [id number]  [id number]  [id number]^JESSICA^CHELLY^[facility]  Echocardiography Laboratory  [address] [place] [address]     Name: JUAN ANTONIO HAMPTON  MRN: [id number]  : 1950  Study Date: 2024 08:09 AM  Age: 73 yrs  Gender: Female  Patient Location: [facility]  Reason For Study: Atrial Fibrillation  History: 27 mm Watchman FLX left atrial appendage occluder (2022)  Ordering Physician: CHELLY LOPEZ  Performed By: SELENE Guo     BSA: 1.6 m2  Height: 66 in  Weight: 122 lb  BP: 113/65  mmHg  ______________________________________________________________________________  Procedure  Complete Portable Echo Adult. Optison (NDC #[id number]) given intravenously.  ______________________________________________________________________________  Interpretation Summary     S/P Placement of a 27 mm Watchman device June 2022 ([facility])     The visual ejection fraction is 60-65%.  The left ventricle is normal in structure, function and size.  No regional wall motion abnormalities noted.  The right ventricle is normal in structure, function and size.  The left atrium is moderate to severely dilated.  Trivial anterior pericardial effusion of no hemodynamic significance  Watchman decice is seen prominately in the left atrum, consider CTA or IAIN to  further characterize this  ______________________________________________________________________________  Left Ventricle  The left ventricle is normal in structure, function and size. There is normal  left ventricular wall thickness. The visual ejection fraction is 60-65%. Left  ventricular diastolic function is indeterminate. No regional wall motion  abnormalities noted.     Right Ventricle  The right ventricle is normal in structure, function and size.     Atria  The left atrium is moderate to severely dilated. Right atrial size is normal.     Mitral Valve  There is trace mitral regurgitation.     Tricuspid Valve  There is trace tricuspid regurgitation.     Aortic Valve  No aortic regurgitation is present.     Pulmonic Valve  The pulmonic valve is not well visualized.     Vessels  The aortic root is normal size. Normal size ascending aorta. The inferior vena  cava was normal in size with preserved respiratory variability.     Pericardium  Trivial anterior pericardial effusion.     ______________________________________________________________________________  MMode/2D Measurements & Calculations  IVSd: 0.77 cm  LVIDd: 4.8 cm  LVIDs: 3.0 cm  LVPWd: 0.90 cm     FS:  37.1 %  LV mass(C)d: 132.0 grams  LV mass(C)dI: 81.4 grams/m2  Ao root diam: 2.8 cm  asc Aorta Diam: 3.2 cm  LVOT diam: 1.8 cm  LVOT area: 2.6 cm2  Ao root diam index Ht(cm/m): 1.6  Ao root diam index BSA (cm/m2): 1.7  Asc Ao diam index BSA (cm/m2): 2.0  Asc Ao diam index Ht(cm/m): 1.9  LA Volume (BP): 86.5 ml     LA Volume Index (BP): 53.4 ml/m2  RV Base: 3.3 cm  RWT: 0.38     Doppler Measurements & Calculations  MV max PG: 3.0 mmHg  MV mean P.7 mmHg  MV V2 VTI: 10.2 cm  PA V2 max: 64.0 cm/sec  PA max P.7 mmHg  PA acc time: 0.10 sec     ______________________________________________________________________________  Report approved by: DEN Anthony 2024 09:40 AM

## 2024-06-16 NOTE — PROVIDER NOTIFICATION
Patient requesting Melatonin, MD notified, Melatonin ordered and given.    Dilt gtt stopped due to HR 48-61, MD Pulido notified.

## 2024-06-16 NOTE — CONSULTS
Care Management Note    Discharge Date: 06/18/2024       Additional Information:  Patient has a URR of 17%. Per review, patient lives at home with spouse. SW consulted for [employer]. May be beneficial to consider a CD consult tomorrow.     SW will follow CD recs.     DARON Rodriguez, [facility]  Emergency Room   Please contact the SW on the floor in which the patient is staying for any questions or concerns

## 2024-06-16 NOTE — PLAN OF CARE
"AO4. RA. Up A1 with cane. Tele Afib CVR. Incontinent of bladder, purwick in place. NS running at 100 mL/hr. K+/Mg+/Phos replaced and recheck this morning. Denies N/V/pain/SOB.     Goal Outcome Evaluation:      Plan of Care Reviewed With: patient    Overall Patient Progress: improvingOverall Patient Progress: improving    Outcome Evaluation: Dilt gtt stopped by MD. HR . BP WDL. Metoprolol held for low BP in the 90s. No pauses on my shift. Electrolytes replaced. Afib CVR.      Problem: Adult Inpatient Plan of Care  Goal: Plan of Care Review  Description: The Plan of Care Review/Shift note should be completed every shift.  The Outcome Evaluation is a brief statement about your assessment that the patient is improving, declining, or no change.  This information will be displayed automatically on your shift  note.  Outcome: Progressing  Flowsheets (Taken 6/16/2024 7803)  Outcome Evaluation: Dilt gtt stopped by MD. HR . BP WDL. Metoprolol held for low BP in the 90s. No pauses on my shift. Electrolytes replaced. Afib CVR.  Plan of Care Reviewed With: patient  Overall Patient Progress: improving  Goal: Patient-Specific Goal (Individualized)  Description: You can add care plan individualizations to a care plan. Examples of Individualization might be:  \"Parent requests to be called daily at 9am for status\", \"I have a hard time hearing out of my right ear\", or \"Do not touch me to wake me up as it startles  me\".  Outcome: Progressing  Goal: Absence of Hospital-Acquired Illness or Injury  Outcome: Progressing  Intervention: Identify and Manage Fall Risk  Recent Flowsheet Documentation  Taken 6/15/2024 9727 by Galina Solorzano, RN  Safety Promotion/Fall Prevention:   activity supervised   assistive device/personal items within reach   clutter free environment maintained   nonskid shoes/slippers when out of bed   supervised activity   toileting scheduled  Intervention: Prevent Skin Injury  Recent Flowsheet " Documentation  Taken 6/15/2024 2315 by Galina Solorzano RN  Body Position: position changed independently  Intervention: Prevent Infection  Recent Flowsheet Documentation  Taken 6/15/2024 2315 by Galina Solorzano RN  Infection Prevention:   rest/sleep promoted   single patient room provided   environmental surveillance performed  Goal: Optimal Comfort and Wellbeing  Outcome: Progressing  Goal: Readiness for Transition of Care  Outcome: Progressing     Problem: Dysrhythmia  Goal: Normalized Cardiac Rhythm  Outcome: Progressing     Problem: Skin Injury Risk Increased  Goal: Skin Health and Integrity  Outcome: Progressing  Intervention: Plan: Nurse Driven Intervention: Moisture Management  Recent Flowsheet Documentation  Taken 6/15/2024 2315 by Galina Solorzano RN  Moisture Interventions: Encourage regular toileting  Bathing/Skin Care: incontinence care  Intervention: Plan: Nurse Driven Intervention: Friction and Shear  Recent Flowsheet Documentation  Taken 6/15/2024 2315 by Galina Solorzano RN  Friction/Shear Interventions: HOB 30 degrees or less  Intervention: Optimize Skin Protection  Recent Flowsheet Documentation  Taken 6/15/2024 2315 by Galina Solorzano RN  Activity Management: activity adjusted per tolerance  Head of Bed (HOB) Positioning: HOB at 20-30 degrees

## 2024-06-17 ENCOUNTER — APPOINTMENT (OUTPATIENT)
Dept: CARDIOLOGY | Facility: CLINIC | Age: 74
DRG: 309 | End: 2024-06-17
Attending: STUDENT IN AN ORGANIZED HEALTH CARE EDUCATION/TRAINING PROGRAM
Payer: COMMERCIAL

## 2024-06-17 LAB
ANION GAP SERPL CALCULATED.3IONS-SCNC: 12 MMOL/L (ref 7–15)
ATRIAL RATE - MUSE: 217 BPM
ATRIAL RATE - MUSE: 312 BPM
BUN SERPL-MCNC: 6.6 MG/DL (ref 8–23)
CALCIUM SERPL-MCNC: 9 MG/DL (ref 8.8–10.2)
CHLORIDE SERPL-SCNC: 109 MMOL/L (ref 98–107)
CREAT SERPL-MCNC: 0.49 MG/DL (ref 0.51–0.95)
DEPRECATED HCO3 PLAS-SCNC: 18 MMOL/L (ref 22–29)
DIASTOLIC BLOOD PRESSURE - MUSE: NORMAL MMHG
DIASTOLIC BLOOD PRESSURE - MUSE: NORMAL MMHG
EGFRCR SERPLBLD CKD-EPI 2021: >90 ML/MIN/1.73M2
GLUCOSE SERPL-MCNC: 171 MG/DL (ref 70–99)
INTERPRETATION ECG - MUSE: NORMAL
INTERPRETATION ECG - MUSE: NORMAL
MAGNESIUM SERPL-MCNC: 1.3 MG/DL (ref 1.7–2.3)
MAGNESIUM SERPL-MCNC: 2.2 MG/DL (ref 1.7–2.3)
P AXIS - MUSE: NORMAL DEGREES
P AXIS - MUSE: NORMAL DEGREES
PHOSPHATE SERPL-MCNC: 1.6 MG/DL (ref 2.5–4.5)
POTASSIUM SERPL-SCNC: 3.9 MMOL/L (ref 3.4–5.3)
POTASSIUM SERPL-SCNC: 3.9 MMOL/L (ref 3.4–5.3)
PR INTERVAL - MUSE: NORMAL MS
PR INTERVAL - MUSE: NORMAL MS
QRS DURATION - MUSE: 74 MS
QRS DURATION - MUSE: 76 MS
QT - MUSE: 204 MS
QT - MUSE: 408 MS
QTC - MUSE: 387 MS
QTC - MUSE: 461 MS
R AXIS - MUSE: -12 DEGREES
R AXIS - MUSE: -6 DEGREES
SCANNED LAB RESULT: ABNORMAL
SCANNED LAB RESULT: NORMAL
SODIUM SERPL-SCNC: 139 MMOL/L (ref 135–145)
SYSTOLIC BLOOD PRESSURE - MUSE: NORMAL MMHG
SYSTOLIC BLOOD PRESSURE - MUSE: NORMAL MMHG
T AXIS - MUSE: -1 DEGREES
T AXIS - MUSE: 220 DEGREES
VENTRICULAR RATE- MUSE: 217 BPM
VENTRICULAR RATE- MUSE: 77 BPM

## 2024-06-17 PROCEDURE — 120N000001 HC R&B MED SURG/OB

## 2024-06-17 PROCEDURE — 99232 SBSQ HOSP IP/OBS MODERATE 35: CPT | Performed by: STUDENT IN AN ORGANIZED HEALTH CARE EDUCATION/TRAINING PROGRAM

## 2024-06-17 PROCEDURE — 36415 COLL VENOUS BLD VENIPUNCTURE: CPT | Performed by: STUDENT IN AN ORGANIZED HEALTH CARE EDUCATION/TRAINING PROGRAM

## 2024-06-17 PROCEDURE — 83735 ASSAY OF MAGNESIUM: CPT | Performed by: STUDENT IN AN ORGANIZED HEALTH CARE EDUCATION/TRAINING PROGRAM

## 2024-06-17 PROCEDURE — 93320 DOPPLER ECHO COMPLETE: CPT | Mod: 26 | Performed by: INTERNAL MEDICINE

## 2024-06-17 PROCEDURE — 84100 ASSAY OF PHOSPHORUS: CPT | Performed by: STUDENT IN AN ORGANIZED HEALTH CARE EDUCATION/TRAINING PROGRAM

## 2024-06-17 PROCEDURE — 82374 ASSAY BLOOD CARBON DIOXIDE: CPT | Performed by: STUDENT IN AN ORGANIZED HEALTH CARE EDUCATION/TRAINING PROGRAM

## 2024-06-17 PROCEDURE — 99152 MOD SED SAME PHYS/QHP 5/>YRS: CPT | Performed by: INTERNAL MEDICINE

## 2024-06-17 PROCEDURE — 93320 DOPPLER ECHO COMPLETE: CPT

## 2024-06-17 PROCEDURE — 93325 DOPPLER ECHO COLOR FLOW MAPG: CPT

## 2024-06-17 PROCEDURE — 93325 DOPPLER ECHO COLOR FLOW MAPG: CPT | Mod: 26 | Performed by: INTERNAL MEDICINE

## 2024-06-17 PROCEDURE — 258N000001 HC RX 258: Performed by: STUDENT IN AN ORGANIZED HEALTH CARE EDUCATION/TRAINING PROGRAM

## 2024-06-17 PROCEDURE — 93312 ECHO TRANSESOPHAGEAL: CPT | Mod: 26 | Performed by: INTERNAL MEDICINE

## 2024-06-17 PROCEDURE — 250N000011 HC RX IP 250 OP 636: Performed by: STUDENT IN AN ORGANIZED HEALTH CARE EDUCATION/TRAINING PROGRAM

## 2024-06-17 PROCEDURE — 250N000009 HC RX 250: Performed by: INTERNAL MEDICINE

## 2024-06-17 PROCEDURE — 250N000013 HC RX MED GY IP 250 OP 250 PS 637: Performed by: INTERNAL MEDICINE

## 2024-06-17 PROCEDURE — 250N000011 HC RX IP 250 OP 636: Performed by: INTERNAL MEDICINE

## 2024-06-17 PROCEDURE — 250N000013 HC RX MED GY IP 250 OP 250 PS 637: Performed by: STUDENT IN AN ORGANIZED HEALTH CARE EDUCATION/TRAINING PROGRAM

## 2024-06-17 PROCEDURE — 80048 BASIC METABOLIC PNL TOTAL CA: CPT | Performed by: STUDENT IN AN ORGANIZED HEALTH CARE EDUCATION/TRAINING PROGRAM

## 2024-06-17 RX ORDER — NALOXONE HYDROCHLORIDE 0.4 MG/ML
0.2 INJECTION, SOLUTION INTRAMUSCULAR; INTRAVENOUS; SUBCUTANEOUS
Status: DISCONTINUED | OUTPATIENT
Start: 2024-06-17 | End: 2024-06-19

## 2024-06-17 RX ORDER — GLYCOPYRROLATE 0.2 MG/ML
0.1 INJECTION, SOLUTION INTRAMUSCULAR; INTRAVENOUS ONCE
Status: COMPLETED | OUTPATIENT
Start: 2024-06-17 | End: 2024-06-17

## 2024-06-17 RX ORDER — MAGNESIUM SULFATE HEPTAHYDRATE 40 MG/ML
4 INJECTION, SOLUTION INTRAVENOUS ONCE
Status: COMPLETED | OUTPATIENT
Start: 2024-06-17 | End: 2024-06-17

## 2024-06-17 RX ORDER — NALOXONE HYDROCHLORIDE 0.4 MG/ML
0.4 INJECTION, SOLUTION INTRAMUSCULAR; INTRAVENOUS; SUBCUTANEOUS
Status: DISCONTINUED | OUTPATIENT
Start: 2024-06-17 | End: 2024-06-19

## 2024-06-17 RX ORDER — FENTANYL CITRATE 50 UG/ML
25 INJECTION, SOLUTION INTRAMUSCULAR; INTRAVENOUS
Status: DISCONTINUED | OUTPATIENT
Start: 2024-06-17 | End: 2024-06-18

## 2024-06-17 RX ORDER — LIDOCAINE HYDROCHLORIDE 20 MG/ML
SOLUTION OROPHARYNGEAL
Status: COMPLETED
Start: 2024-06-17 | End: 2024-06-17

## 2024-06-17 RX ORDER — METOPROLOL SUCCINATE 50 MG/1
50 TABLET, EXTENDED RELEASE ORAL DAILY
Status: DISCONTINUED | OUTPATIENT
Start: 2024-06-17 | End: 2024-06-20 | Stop reason: HOSPADM

## 2024-06-17 RX ORDER — LIDOCAINE HYDROCHLORIDE 20 MG/ML
15 SOLUTION OROPHARYNGEAL ONCE
Status: COMPLETED | OUTPATIENT
Start: 2024-06-17 | End: 2024-06-17

## 2024-06-17 RX ORDER — FENTANYL CITRATE 50 UG/ML
INJECTION, SOLUTION INTRAMUSCULAR; INTRAVENOUS
Status: DISCONTINUED
Start: 2024-06-17 | End: 2024-06-17 | Stop reason: WASHOUT

## 2024-06-17 RX ORDER — GLYCOPYRROLATE 0.2 MG/ML
INJECTION, SOLUTION INTRAMUSCULAR; INTRAVENOUS
Status: COMPLETED
Start: 2024-06-17 | End: 2024-06-17

## 2024-06-17 RX ORDER — FLUMAZENIL 0.1 MG/ML
0.2 INJECTION, SOLUTION INTRAVENOUS
Status: DISCONTINUED | OUTPATIENT
Start: 2024-06-17 | End: 2024-06-18

## 2024-06-17 RX ORDER — LIDOCAINE HYDROCHLORIDE 40 MG/ML
1.5 SOLUTION TOPICAL ONCE
Status: COMPLETED | OUTPATIENT
Start: 2024-06-17 | End: 2024-06-17

## 2024-06-17 RX ORDER — DEXTROSE MONOHYDRATE 25 G/50ML
9.5 INJECTION, SOLUTION INTRAVENOUS
Status: DISCONTINUED | OUTPATIENT
Start: 2024-06-17 | End: 2024-06-19

## 2024-06-17 RX ORDER — FENTANYL CITRATE 50 UG/ML
INJECTION, SOLUTION INTRAMUSCULAR; INTRAVENOUS
Status: COMPLETED
Start: 2024-06-17 | End: 2024-06-17

## 2024-06-17 RX ORDER — SODIUM CHLORIDE 9 MG/ML
INJECTION, SOLUTION INTRAVENOUS CONTINUOUS PRN
Status: DISCONTINUED | OUTPATIENT
Start: 2024-06-17 | End: 2024-06-19

## 2024-06-17 RX ORDER — LIDOCAINE 50 MG/G
OINTMENT TOPICAL ONCE
Status: COMPLETED | OUTPATIENT
Start: 2024-06-17 | End: 2024-06-17

## 2024-06-17 RX ADMIN — POTASSIUM & SODIUM PHOSPHATES POWDER PACK 280-160-250 MG 2 PACKET: 280-160-250 PACK at 18:55

## 2024-06-17 RX ADMIN — POTASSIUM & SODIUM PHOSPHATES POWDER PACK 280-160-250 MG 2 PACKET: 280-160-250 PACK at 22:47

## 2024-06-17 RX ADMIN — DEXTROSE MONOHYDRATE, SODIUM CHLORIDE, SODIUM LACTATE, POTASSIUM CHLORIDE, CALCIUM CHLORIDE: 5; 600; 310; 179; 20 INJECTION, SOLUTION INTRAVENOUS at 10:24

## 2024-06-17 RX ADMIN — GABAPENTIN 900 MG: 300 CAPSULE ORAL at 12:47

## 2024-06-17 RX ADMIN — PANTOPRAZOLE SODIUM 40 MG: 40 TABLET, DELAYED RELEASE ORAL at 08:13

## 2024-06-17 RX ADMIN — FENTANYL CITRATE 50 MCG: 50 INJECTION, SOLUTION INTRAMUSCULAR; INTRAVENOUS at 10:55

## 2024-06-17 RX ADMIN — MAGNESIUM SULFATE HEPTAHYDRATE 4 G: 40 INJECTION, SOLUTION INTRAVENOUS at 12:46

## 2024-06-17 RX ADMIN — TOPICAL ANESTHETIC 1 ML: 200 SPRAY DENTAL; PERIODONTAL at 10:54

## 2024-06-17 RX ADMIN — GABAPENTIN 900 MG: 300 CAPSULE ORAL at 20:46

## 2024-06-17 RX ADMIN — METOPROLOL TARTRATE 25 MG: 25 TABLET, FILM COATED ORAL at 08:12

## 2024-06-17 RX ADMIN — THIAMINE HYDROCHLORIDE 500 MG: 100 INJECTION, SOLUTION INTRAMUSCULAR; INTRAVENOUS at 08:12

## 2024-06-17 RX ADMIN — METOPROLOL SUCCINATE 50 MG: 50 TABLET, EXTENDED RELEASE ORAL at 18:54

## 2024-06-17 RX ADMIN — FOLIC ACID 1 MG: 1 TABLET ORAL at 08:13

## 2024-06-17 RX ADMIN — GABAPENTIN 900 MG: 300 CAPSULE ORAL at 04:31

## 2024-06-17 RX ADMIN — Medication 1 TABLET: at 08:12

## 2024-06-17 RX ADMIN — MIDAZOLAM 2 MG: 1 INJECTION INTRAMUSCULAR; INTRAVENOUS at 10:55

## 2024-06-17 RX ADMIN — DEXTROSE MONOHYDRATE, SODIUM CHLORIDE, SODIUM LACTATE, POTASSIUM CHLORIDE, CALCIUM CHLORIDE: 5; 600; 310; 179; 20 INJECTION, SOLUTION INTRAVENOUS at 21:07

## 2024-06-17 RX ADMIN — METOPROLOL TARTRATE 25 MG: 25 TABLET, FILM COATED ORAL at 15:11

## 2024-06-17 RX ADMIN — POTASSIUM & SODIUM PHOSPHATES POWDER PACK 280-160-250 MG 2 PACKET: 280-160-250 PACK at 15:35

## 2024-06-17 RX ADMIN — THIAMINE HYDROCHLORIDE 500 MG: 100 INJECTION, SOLUTION INTRAMUSCULAR; INTRAVENOUS at 15:36

## 2024-06-17 RX ADMIN — PANTOPRAZOLE SODIUM 40 MG: 40 TABLET, DELAYED RELEASE ORAL at 15:36

## 2024-06-17 RX ADMIN — GLYCOPYRROLATE 0.1 MG: 0.2 INJECTION, SOLUTION INTRAMUSCULAR; INTRAVENOUS at 10:39

## 2024-06-17 RX ADMIN — LIDOCAINE HYDROCHLORIDE 15 ML: 20 SOLUTION OROPHARYNGEAL at 10:37

## 2024-06-17 RX ADMIN — LIDOCAINE HYDROCHLORIDE 15 ML: 20 SOLUTION ORAL at 10:37

## 2024-06-17 RX ADMIN — THIAMINE HYDROCHLORIDE 500 MG: 100 INJECTION, SOLUTION INTRAMUSCULAR; INTRAVENOUS at 22:48

## 2024-06-17 ASSESSMENT — ACTIVITIES OF DAILY LIVING (ADL)
ADLS_ACUITY_SCORE: 38

## 2024-06-17 NOTE — PROGRESS NOTES
IAIN performed without complication.  Pt tolerated procedure well.  Pt transferred back to PCU; report given to receiving nurse.

## 2024-06-17 NOTE — PROGRESS NOTES
[facility]    Medicine Progress Note - Hospitalist Service    Date of Admission:  6/15/2024    Assessment & Plan     SVT  Atrial fibrillation with RVR  S/p Watchman device placement 7/2022   On presenting EKG in ER, converted to atrial fibrillation after 6 mg IV adenosine. Was on diltiazem gtt, lopressor started with plan to wean off dilt and consolidate metoprolol.  -diltiazem gtt with goal HR <100, stop when HR goal  -lopressor 25 mg q6 h , will consolidate when HR controlled  -TTE with EF 60-65%; does note significant LA dilation, watchman device seen prominently in LA. Discussed with cardiology on call, Dr. Jain, will further characterize with IAIN in AM. IAIN ordered, on schedule for today    Starvation Ketoacidosis  Hypokalemia  Hypomagnesemia  Hypophosphatemia   Likely related to decreased PO intake, alcohol use, likely refeeding.   -Nutrition consult  -D5LR with K, 100 ml/hr  -RN managed replacement protocols  -high dose thiamine, folate    Alcohol use disorder  Daily use, no hx withdrawal per patient. She has been cutting down recently.  -CIWA protocol ordered   -lyte and nutrition as above  -addiction medicine consult placed given severity, high risk comorbidities, previously sober in structured living environment       Diet: Regular Diet Adult  Snacks/Supplements Adult: Ensure Enlive; Between Meals    DVT Prophylaxis: Pneumatic Compression Devices  Billings Catheter: Not present  Lines: None     Cardiac Monitoring: None  Code Status: Full Code      Clinically Significant Risk Factors        # Hypokalemia: Lowest K = 3 mmol/L in last 2 days, will replace as needed     # Hypomagnesemia: Lowest Mg = 1.3 mg/dL in last 2 days, will replace as needed  # Anion Gap Metabolic Acidosis: Highest Anion Gap = 27 mmol/L in last 2 days, will monitor and treat as appropriate                      # Moderate Malnutrition: based on nutrition assessment, PRESENT ON ADMISSION          Disposition Plan       Expected Discharge Date: 2024        Discharge Comments: will need placement          Staci Cruz DO  Hospitalist Service  [facility]  Securely message with [employer] (more info)  Text page via [employer] Paging/Directory   ______________________________________________________________________      Physical Exam   Vital Signs: Temp: 98.7  F (37.1  C) Temp src: Oral BP: 109/71 Pulse: 96   Resp: 16 SpO2: 96 % O2 Device: None (Room air) Oxygen Delivery: 4 LPM  Weight: 122 lbs 11.2 oz    General: Very pleasant female , NAD  HEENT:  Sclerae anicteric.  Mucous membranes moist.  Cardiac: rate controlled.  Respiratory: Normal work of breathing.    GI:  Abdomen soft, nontender, nondistended.  Neurologic: Alert. No tremors  Psychologic: Appropriate mood and affect.      Medical Decision Making       45 MINUTES SPENT BY ME on the date of service doing chart review, history, exam, documentation & further activities per the note.        Data     I have personally reviewed the following data over the past 24 hrs:    N/A  \   N/A   / N/A     139 109 (H) 6.6 (L) /  171 (H)   3.9; 3.9 18 (L) 0.49 (L) \       Imaging results reviewed over the past 24 hrs:   Recent Results (from the past 24 hour(s))   Transesophageal Echocardiogram    Narrative    [id number]  [id number]  [id number]  [id number]^STACI     [facility]  Echocardiography Laboratory  [address] [place] [address]     Name: JUAN ANTONIO HAMPTON  MRN: [id number]  : 1950  Study Date: 2024 10:54 AM  Age: 73 yrs  Gender: Female  Patient Location: [facility]  Reason For Study: S/p watchman  Ordering Physician: STACI CRUZ  Performed By: Candice Goodman  Height: 66 in     HR: 94  ______________________________________________________________________________  Procedure  Complete IAIN Adult. IAIN Probe serial #[id number] (R) was used during the  procedure. The heart rate, respiratory rate and response to care were  monitored  throughout the procedure with the assistance of the nurse.  ______________________________________________________________________________  Interpretation Summary     There is mild (1+) mitral regurgitation.  Left ventricular systolic function is normal.  There is mod-severe biatrial enlargement.  Left atrial appendage occlusion device is very seated and there is no flow  detected in the appendage.  ______________________________________________________________________________  IAIN  I determined this patient to be an appropriate candidate for the planned  sedation and procedure and have reassessed the patient immediately prior to  sedation and procedure. Total sedation time: 7 minutes of continuous bedside  1:1 monitoring. Versed (2mg) was given intravenously. Fentanyl (50mcg) was  given intravenously. Prior to the exam, the oral cavity was checked and no  overcrowding was noted. Consent to the procedure was obtained prior to  sedation. There were no complications associated with this procedure. The  transesophageal probe was passed without difficulty.     Left Ventricle  The left ventricle is normal in size. There is normal left ventricular wall  thickness. Left ventricular systolic function is normal.     Right Ventricle  The right ventricle is mildly dilated. The right ventricular systolic function  is normal.     Atria  There is mod-severe biatrial enlargement. Intact atrial septum. There is no  color Doppler evidence of an atrial shunt. A contrast injection (Bubble Study)  was performed that was negative for flow across the interatrial septum. The  left atrial appendage was well visualized and free of thrombus.     Mitral Valve  The mitral valve leaflets are mildly thickened. There is mild (1+) mitral  regurgitation.     Tricuspid Valve  Normal tricuspid valve. There is mild (1+) tricuspid regurgitation.     Aortic Valve  The aortic valve is normal in structure and function.     Pulmonic Valve  Normal pulmonic  valve.     Vessels  The aortic root is normal size.     Pericardial/Pleural  There is no pericardial effusion.     Rhythm  Sinus rhythm was noted.  ______________________________________________________________________________  Report approved by: Lia Rivas 06/17/2024 12:21 PM     ______________________________________________________________________________

## 2024-06-17 NOTE — PLAN OF CARE
"VSS.  Alert & Oriented x4. Incontinent of urine.  IAIN performed today.  Left PIV: D5 LR + Kcl 20 mEq/L @ 100 ml/hr.  Discharge pending.    Goal Outcome Evaluation:      Plan of Care Reviewed With: patient    Overall Patient Progress: improvingOverall Patient Progress: improving    Outcome Evaluation: TTE performed. NSR. Mag & Phos replaced.      Problem: Heart Failure  Goal: Stable Heart Rate and Rhythm  Outcome: Not Progressing  Goal: Fluid and Electrolyte Balance  Outcome: Not Progressing     Problem: Adult Inpatient Plan of Care  Goal: Plan of Care Review  Description: The Plan of Care Review/Shift note should be completed every shift.  The Outcome Evaluation is a brief statement about your assessment that the patient is improving, declining, or no change.  This information will be displayed automatically on your shift  note.  Outcome: Progressing  Flowsheets (Taken 6/17/2024 1725)  Outcome Evaluation: TTE performed. NSR. Mag & Phos replaced.  Plan of Care Reviewed With: patient  Overall Patient Progress: improving  Goal: Patient-Specific Goal (Individualized)  Description: You can add care plan individualizations to a care plan. Examples of Individualization might be:  \"Parent requests to be called daily at 9am for status\", \"I have a hard time hearing out of my right ear\", or \"Do not touch me to wake me up as it startles  me\".  Outcome: Progressing  Goal: Absence of Hospital-Acquired Illness or Injury  Outcome: Progressing  Intervention: Identify and Manage Fall Risk  Recent Flowsheet Documentation  Taken 6/17/2024 0815 by Rj Chong, RN  Safety Promotion/Fall Prevention: safety round/check completed  Intervention: Prevent and Manage VTE (Venous Thromboembolism) Risk  Recent Flowsheet Documentation  Taken 6/17/2024 0815 by Rj Chong, RN  VTE Prevention/Management: SCDs (sequential compression devices) off  Intervention: Prevent Infection  Recent Flowsheet Documentation  Taken 6/17/2024 0815 by " Rj Chong RN  Infection Prevention: hand hygiene promoted  Goal: Optimal Comfort and Wellbeing  Outcome: Progressing  Goal: Readiness for Transition of Care  Outcome: Progressing     Problem: Dysrhythmia  Goal: Normalized Cardiac Rhythm  Outcome: Progressing  Intervention: Monitor and Manage Cardiac Rhythm Effect  Recent Flowsheet Documentation  Taken 6/17/2024 0815 by Rj Chong RN  VTE Prevention/Management: SCDs (sequential compression devices) off     Problem: Skin Injury Risk Increased  Goal: Skin Health and Integrity  Outcome: Progressing  Intervention: Plan: Nurse Driven Intervention: Moisture Management  Recent Flowsheet Documentation  Taken 6/17/2024 0815 by Rj Chong RN  Moisture Interventions:   Encourage regular toileting   No brief in bed   Incontinence pad  Intervention: Plan: Nurse Driven Intervention: Friction and Shear  Recent Flowsheet Documentation  Taken 6/17/2024 0815 by Rj Chong RN  Friction/Shear Interventions: HOB 30 degrees or less     Problem: Heart Failure  Goal: Optimal Coping  Outcome: Progressing  Goal: Optimal Cardiac Output  Outcome: Progressing  Goal: Optimal Functional Ability  Outcome: Progressing  Goal: Improved Oral Intake  Outcome: Progressing  Goal: Effective Oxygenation and Ventilation  Outcome: Progressing  Goal: Effective Breathing Pattern During Sleep  Outcome: Progressing  Intervention: Monitor and Manage Obstructive Sleep Apnea  Recent Flowsheet Documentation  Taken 6/17/2024 0815 by Rj Chong RN  Medication Review/Management: medications reviewed     Problem: Comorbidity Management  Goal: Blood Pressure in Desired Range  Outcome: Progressing  Intervention: Maintain Blood Pressure Management  Recent Flowsheet Documentation  Taken 6/17/2024 0815 by Rj Chong RN  Medication Review/Management: medications reviewed     Problem: Fall Injury Risk  Goal: Absence of Fall and Fall-Related Injury  Outcome:  Progressing  Intervention: Identify and Manage Contributors  Recent Flowsheet Documentation  Taken 6/17/2024 0815 by Rj Chong, RN  Medication Review/Management: medications reviewed  Intervention: Promote Injury-Free Environment  Recent Flowsheet Documentation  Taken 6/17/2024 0815 by Rj Chong, RN  Safety Promotion/Fall Prevention: safety round/check completed

## 2024-06-17 NOTE — CONSULTS
"CLINICAL NUTRITION SERVICES  -  ASSESSMENT NOTE    Recommendations Ordered by Registered Dietitian (RD):   Diet per MD   Ordered Ensure Enlive PRN (patient to order when she is unable to consume a full meal)   Continue MVI+M as ordered   Malnutrition:   % Weight Loss:  Weight loss does not meet criteria for malnutrition - 3.1% in one month  % Intake:  </= 50% for >/= 5 days (severe malnutrition)  Subcutaneous Fat Loss:  Orbital region mild-moderate depletion and Upper arm region moderate depletion  Muscle Loss:  Temporal region mild depletion, Clavicle bone region mild-moderate depletion, Acromion bone region mild depletion, Scapular bone region mild depletion, Anterior thigh region mild-moderate depletion, and Posterior calf region moderate depletion  Fluid Retention:  None noted    Malnutrition Diagnosis: Moderate malnutrition  In Context of:  Acute illness or injury  Chronic illness or disease  Environmental or social circumstances      REASON FOR ASSESSMENT  Rizwana Aguilar is a 73 year old female seen by Registered Dietitian for Provider Order - \"severe malnutrition\"     Past medical history:  paroxysymal afib s/p Watchman's device and not on anticoagulation, HTN, alcohol use disorder with daily alcohol use, alcoholic liver disease, pancreatitis, breast cancer, memory loss, erosive gastropathy     Admitted for:   SVT  Atrial fibrillation with RVR  S/p Watchman device placement 7/2022   Starvation Ketoacidosis  Hypokalemia  Hypomagnesemia  Hypophosphatemia     NUTRITION HISTORY  - Information obtained from patient and chart   - Typical food/fluid intake PTA: pt reports that she wasn't eating very much for ~ 4 days PTA. Notes that she didn't have an appetite and endorses nausea. Usually tries to consume 3 meals per day however she admits that this is not always the case. Most days she is consuming an ensure in place of a meal.   - Supplements: ensure enlive TID if skipping meals.   - Food allergies: NKFA    CURRENT " "NUTRITION ORDERS  Diet Order:     NPO for IAIN     Current Intake/Tolerance:  One meal documented at 75% consumed.    Obtained from Chart/Interdisciplinary Team:  - Reviewed stooling patterns - none to comment on on   - Please refer to flowsheets for more information on skin     ANTHROPOMETRICS  Height: 5' 6\" --> taken on 4/27/2024  Weight: 55.7 kg ( 122 lbs 11.2 oz)   Body mass index is 19.8 kg/m .  Weight Status:  Normal BMI  Weight History: Pt reports weight loss over the past few days? Weights are difficult to read as there are very few to read from. Question if weight from 4/27 is reported? Weight loss of 1.8 kg (3.1%) over the past < 1 month  Wt Readings from Last 10 Encounters:   06/15/24 55.7 kg (122 lb 11.2 oz)   04/27/24 63.5 kg (140 lb)   12/04/22 59 kg (130 lb)   05/09/21 63.1 kg (139 lb 3.2 oz)   01/08/20 56 kg (123 lb 7.3 oz)     Per care everywhere:  57.5 kg (126 lb 12.8 oz) 05/23/2024 1:19 PM CDT     LABS  Labs reviewed    Labs:  Electrolytes  Potassium (mmol/L)   Date Value   06/17/2024 3.9   06/16/2024 4.2   06/16/2024 3.0 (L)   05/09/2021 3.1 (L)   05/08/2021 3.2 (L)   05/08/2021 3.1 (L)     Phosphorus (mg/dL)   Date Value   06/17/2024 1.6 (L)   06/16/2024 2.1 (L)   06/15/2024 2.0 (L)   05/01/2024 3.0   04/30/2024 2.9   05/09/2021 0.9 (LL)   05/08/2021 1.2 (L)   05/07/2021 2.6   01/11/2020 3.1   01/10/2020 1.9 (L)    Blood Glucose  Glucose (mg/dL)   Date Value   06/16/2024 104 (H)   06/15/2024 127 (H)   06/15/2024 197 (H)   04/28/2024 147 (H)   04/27/2024 111 (H)   05/09/2021 126 (H)   05/08/2021 142 (H)   05/08/2021 85   05/07/2021 95   01/11/2020 103 (H)    Inflammatory Markers  WBC (10e9/L)   Date Value   05/09/2021 4.2   05/08/2021 7.5   05/07/2021 6.8     WBC Count (10e3/uL)   Date Value   06/16/2024 3.9 (L)   06/15/2024 6.3   04/29/2024 3.6 (L)     Albumin (g/dL)   Date Value   06/16/2024 3.6   06/15/2024 4.2   04/28/2024 3.5   05/09/2021 2.9 (L)   05/08/2021 3.4   05/07/2021 4.3    "   Magnesium (mg/dL)   Date Value   06/17/2024 1.3 (L)   06/16/2024 1.9   06/15/2024 2.2   05/09/2021 1.0 (L)   05/08/2021 1.4 (L)   05/07/2021 1.4 (L)     Sodium (mmol/L)   Date Value   06/16/2024 139   06/15/2024 136   06/15/2024 136   05/09/2021 137   05/08/2021 138   05/08/2021 139    Renal  Urea Nitrogen (mg/dL)   Date Value   06/16/2024 6.6 (L)   06/15/2024 6.0 (L)   06/15/2024 6.7 (L)   05/09/2021 2 (L)   05/08/2021 5 (L)   05/08/2021 8     Creatinine (mg/dL)   Date Value   06/16/2024 0.49 (L)   06/15/2024 0.54   06/15/2024 0.69   05/09/2021 0.45 (L)   05/08/2021 0.54   05/08/2021 0.71     Additional  Ketones Urine (mg/dL)   Date Value   04/27/2024 >150 (A)   01/08/2020 >150 (A)        MEDICATIONS  Medications reviewed    Current Facility-Administered Medications   Medication Dose Route Frequency Provider Last Rate Last Admin    folic acid (FOLVITE) tablet 1 mg  1 mg Oral Daily Jean-Pierre Carnes MD   1 mg at 06/17/24 0813    [START ON 6/23/2024] gabapentin (NEURONTIN) capsule 100 mg  100 mg Oral Q8H Staci Cruz DO        [START ON 6/21/2024] gabapentin (NEURONTIN) capsule 300 mg  300 mg Oral Q8H Staci Cruz DO        [START ON 6/19/2024] gabapentin (NEURONTIN) capsule 600 mg  600 mg Oral Q8H Staci Cruz DO        gabapentin (NEURONTIN) capsule 900 mg  900 mg Oral Q8H Staci Cruz DO   900 mg at 06/17/24 0431    magnesium sulfate 4 g in 100 mL sterile water intermittent infusion  4 g Intravenous Once Staci Cruz DO        metoprolol tartrate (LOPRESSOR) tablet 25 mg  25 mg Oral Q6H Jean-Pierre Carnes MD   25 mg at 06/17/24 0812    multivitamin w/minerals (THERA-VIT-M) tablet 1 tablet  1 tablet Oral Daily Jean-Pierre Carnes MD   1 tablet at 06/17/24 0812    pantoprazole (PROTONIX) EC tablet 40 mg  40 mg Oral BID AC Jean-Pierre Carnes MD   40 mg at 06/17/24 0813    sodium chloride (PF) 0.9% PF flush 3 mL  3 mL Intravenous Q8H Staci Cruz DO        sodium chloride (PF)  0.9% PF flush 3 mL  3 mL Intracatheter Q8H Jean-Pierre Carnes MD   3 mL at 06/16/24 0352    sodium phosphate 15 mmol in NS 250mL intermittent infusion  15 mmol Intravenous Once Staci Cruz DO        thiamine (B-1) injection 500 mg  500 mg Intravenous TID Staci Cruz DO   500 mg at 06/17/24 0812    Followed by    [START ON 6/19/2024] thiamine (B-1) injection 250 mg  250 mg Intravenous Daily Staci Cruz DO        Followed by    [START ON 6/24/2024] thiamine (B-1) tablet 100 mg  100 mg Oral Daily Staci Cruz DO          Current Facility-Administered Medications   Medication Dose Route Frequency Provider Last Rate Last Admin    dextrose 5% in lactated ringers + KCl 20 mEq/L infusion   Intravenous Continuous Staci Cruz  mL/hr at 06/16/24 2334 New Bag at 06/16/24 2334    Give   of usual dose of LONG ACTING insulin AM of procedure IF diabetic   Does not apply Continuous PRN Staci Cruz DO        May take oral meds with a sip of water, the morning of IAIN procedure.   Does not apply Continuous PRN Staci Cruz DO          Current Facility-Administered Medications   Medication Dose Route Frequency Provider Last Rate Last Admin    acetaminophen (TYLENOL) tablet 650 mg  650 mg Oral Q4H PRN Jean-Pierre Carnes MD        Or    acetaminophen (TYLENOL) Suppository 650 mg  650 mg Rectal Q4H PRN Jean-Pierre Carnse MD        calcium carbonate (TUMS) chewable tablet 1,000 mg  1,000 mg Oral 4x Daily PRN Jean-Pierre Carnes MD        diazepam (VALIUM) tablet 10 mg  10 mg Oral Q30 Min PRN Staci Cruz DO        Or    diazepam (VALIUM) injection 5-10 mg  5-10 mg Intravenous Q30 Min PRN Staci Cruz DO        flumazenil (ROMAZICON) injection 0.2 mg  0.2 mg Intravenous q1 min prn Staci Cruz DO        Give   of usual dose of LONG ACTING insulin AM of procedure IF diabetic   Does not apply Continuous PRN Staci Cruz, DO        OLANZapine zydis (zyPREXA) ODT tab 5-10 mg  5-10  mg Oral Q6H PRN Staci Cruz DO        Or    haloperidol lactate (HALDOL) injection 2.5-5 mg  2.5-5 mg Intravenous Q6H PRN Staci Cruz DO        HOLD: Insulin - RAPID/SHORT acting AM of procedure IF diabetic   Does not apply HOLD Staci Cruz DO        HOLD: Insulin - REGULAR AM of procedure IF diabetic   Does not apply HOLD Staci Cruz DO        HOLD: Oral hypoglycemics AM of procedure IF diabetic   Does not apply HOLD Staci Cruz DO        lidocaine (LMX4) cream   Topical Q1H PRN Jean-Pierre Carnes MD        lidocaine 1 % 0.1-1 mL  0.1-1 mL Other Q1H PRN Jean-Pierre Carnes MD        May take oral meds with a sip of water, the morning of IAIN procedure.   Does not apply Continuous PRN Staci Cruz DO        melatonin tablet 5 mg  5 mg Oral QPM PRN Staci Cruz DO   5 mg at 06/16/24 2027    ondansetron (ZOFRAN ODT) ODT tab 4 mg  4 mg Oral Q6H PRN Jean-Pierre Carnes MD        Or    ondansetron (ZOFRAN) injection 4 mg  4 mg Intravenous Q6H PRN Jean-Pierre Carnes MD        senna-docusate (SENOKOT-S/PERICOLACE) 8.6-50 MG per tablet 1 tablet  1 tablet Oral BID PRN Jean-Pierre Carnes MD        Or    senna-docusate (SENOKOT-S/PERICOLACE) 8.6-50 MG per tablet 2 tablet  2 tablet Oral BID PRN Jean-Pierre Carnes MD        sodium chloride (PF) 0.9% PF flush 3 mL  3 mL Intravenous Q1H PRN Staci Cruz DO        sodium chloride (PF) 0.9% PF flush 3 mL  3 mL Intracatheter q1 min prn Jean-Pierre Carnes MD   3 mL at 06/17/24 0815        ASSESSED NUTRITION NEEDS PER APPROVED PRACTICE GUIDELINES:    Dosing Weight 55.7 kg   Estimated Energy Needs: 9446-1475+ kcals (25-30+ Kcal/Kg)  Justification: minimum maintenance  Estimated Protein Needs: 67-84 grams protein (1.2-1.5 g pro/Kg)  Justification: preservation of lean body mass  Estimated Fluid Needs: per MD     NUTRITION DIAGNOSIS:  Inadequate oral intake related to nausea/lack of appetite, suspect also in part to EtOH consumption  as evidenced by patient likely consuming <50% of nutritional needs over the past >5 days     NUTRITION INTERVENTIONS  Recommendations / Nutrition Prescription  Diet per MD   Ordered Ensure Enlive PRN (patient to order when she is unable to consume a full meal)   Continue MVI+M as ordered    Implementation  Nutrition education: Provided education on the different oral nutritional supplements available + indications for use. Encouraged protein sources with meals   Medical Food Supplement and Multivitamin/Mineral: as above     Nutrition Goals  Patient to consume 75% of meals or oral nutritional supplements ordered TID    MONITORING AND EVALUATION:  Progress towards goals will be monitored and evaluated per protocol and Practice Guidelines    Rachael Gamble RD, LD  Clinical Dietitian     3rd floor/ICU: [phone]  All other floors: [phone]  Weekend/holiday: [phone]  Office: [phone]

## 2024-06-17 NOTE — PRE-PROCEDURE
GENERAL PRE-PROCEDURE:   Procedure:  IAIN  Date/Time:  6/17/2024 10:53 AM    Verbal consent obtained?: Yes    Written consent obtained?: Yes    Risks and benefits: Risks, benefits and alternatives were discussed    Consent given by:  Patient  Patient states understanding of procedure being performed: Yes    Patient's understanding of procedure matches consent: Yes    Procedure consent matches procedure scheduled: Yes    Expected level of sedation:  Moderate  Appropriately NPO:  Yes  ASA Class:  2  Mallampati  :  Grade 2- soft palate, base of uvula, tonsillar pillars, and portion of posterior pharyngeal wall visible  Lungs:  Lungs clear with good breath sounds bilaterally  Heart:  Normal heart sounds and rate  History & Physical reviewed:  History and physical reviewed and no updates needed  Statement of review:  I have reviewed the lab findings, diagnostic data, medications, and the plan for sedation

## 2024-06-17 NOTE — PLAN OF CARE
"Pt alert and oriented x 4. Denies pain. On RA. IVF infusing. Incontinent of bladder. NPO.     Problem: Adult Inpatient Plan of Care  Goal: Plan of Care Review  Description: The Plan of Care Review/Shift note should be completed every shift.  The Outcome Evaluation is a brief statement about your assessment that the patient is improving, declining, or no change.  This information will be displayed automatically on your shift  note.  Outcome: Progressing  Flowsheets (Taken 6/17/2024 [id number])  Plan of Care Reviewed With: patient  Overall Patient Progress: improving  Goal: Patient-Specific Goal (Individualized)  Description: You can add care plan individualizations to a care plan. Examples of Individualization might be:  \"Parent requests to be called daily at 9am for status\", \"I have a hard time hearing out of my right ear\", or \"Do not touch me to wake me up as it startles  me\".  Outcome: Progressing  Goal: Absence of Hospital-Acquired Illness or Injury  Outcome: Progressing  Intervention: Identify and Manage Fall Risk  Recent Flowsheet Documentation  Taken 6/16/2024 2026 by Della Hernandez RN  Safety Promotion/Fall Prevention:   activity supervised   clutter free environment maintained   increased rounding and observation   nonskid shoes/slippers when out of bed   patient and family education   room near nurse's station   safety round/check completed  Intervention: Prevent Skin Injury  Recent Flowsheet Documentation  Taken 6/16/2024 2026 by Della Hernandez RN  Body Position: position changed independently  Device Skin Pressure Protection: absorbent pad utilized/changed  Intervention: Prevent and Manage VTE (Venous Thromboembolism) Risk  Recent Flowsheet Documentation  Taken 6/16/2024 2026 by Della Hernandez RN  VTE Prevention/Management: SCDs (sequential compression devices) off  Intervention: Prevent Infection  Recent Flowsheet Documentation  Taken 6/16/2024 2026 by Della Hernandez, " RN  Infection Prevention:   hand hygiene promoted   single patient room provided  Goal: Optimal Comfort and Wellbeing  Outcome: Progressing  Goal: Readiness for Transition of Care  Outcome: Progressing     Problem: Dysrhythmia  Goal: Normalized Cardiac Rhythm  Outcome: Progressing  Intervention: Monitor and Manage Cardiac Rhythm Effect  Recent Flowsheet Documentation  Taken 6/16/2024 2026 by Della Hernandez RN  VTE Prevention/Management: SCDs (sequential compression devices) off     Problem: Skin Injury Risk Increased  Goal: Skin Health and Integrity  Outcome: Progressing  Intervention: Plan: Nurse Driven Intervention: Moisture Management  Recent Flowsheet Documentation  Taken 6/16/2024 2026 by Della Hernandez RN  Moisture Interventions: Incontinence pad  Taken 6/16/2024 2000 by Della Hernandez RN  Moisture Interventions: Incontinence pad  Bathing/Skin Care: incontinence care  Intervention: Plan: Nurse Driven Intervention: Friction and Shear  Recent Flowsheet Documentation  Taken 6/16/2024 2026 by Della Hernandez RN  Friction/Shear Interventions: HOB 30 degrees or less  Intervention: Optimize Skin Protection  Recent Flowsheet Documentation  Taken 6/16/2024 2026 by Della Hernandez RN  Pressure Reduction Techniques: weight shift assistance provided  Activity Management: activity adjusted per tolerance  Head of Bed (HOB) Positioning: HOB at 30-45 degrees     Problem: Heart Failure  Goal: Optimal Coping  Outcome: Progressing  Goal: Optimal Cardiac Output  Outcome: Progressing  Goal: Stable Heart Rate and Rhythm  Outcome: Progressing  Goal: Optimal Functional Ability  Outcome: Progressing  Intervention: Optimize Functional Ability  Recent Flowsheet Documentation  Taken 6/16/2024 2026 by Della Hernandez, RN  Activity Management: activity adjusted per tolerance  Goal: Fluid and Electrolyte Balance  Outcome: Progressing  Goal: Improved Oral Intake  Outcome: Progressing  Goal:  Effective Oxygenation and Ventilation  Outcome: Progressing  Intervention: Promote Airway Secretion Clearance  Recent Flowsheet Documentation  Taken 6/16/2024 2026 by Della Hernandez, RN  Cough And Deep Breathing: done independently per patient  Activity Management: activity adjusted per tolerance  Intervention: Optimize Oxygenation and Ventilation  Recent Flowsheet Documentation  Taken 6/16/2024 2026 by Della Hernandez, RN  Head of Bed (HOB) Positioning: HOB at 30-45 degrees  Goal: Effective Breathing Pattern During Sleep  Outcome: Progressing  Intervention: Monitor and Manage Obstructive Sleep Apnea  Recent Flowsheet Documentation  Taken 6/16/2024 2026 by Della Hernandez, RN  Medication Review/Management:   medications reviewed   high-risk medications identified     Problem: Comorbidity Management  Goal: Blood Pressure in Desired Range  Outcome: Progressing  Intervention: Maintain Blood Pressure Management  Recent Flowsheet Documentation  Taken 6/16/2024 2026 by Della Hernandez, RN  Medication Review/Management:   medications reviewed   high-risk medications identified     Problem: Fall Injury Risk  Goal: Absence of Fall and Fall-Related Injury  Outcome: Progressing  Intervention: Identify and Manage Contributors  Recent Flowsheet Documentation  Taken 6/16/2024 2026 by Dlela Hernandez, RN  Medication Review/Management:   medications reviewed   high-risk medications identified  Intervention: Promote Injury-Free Environment  Recent Flowsheet Documentation  Taken 6/16/2024 2026 by Della Hernandez, RN  Safety Promotion/Fall Prevention:   activity supervised   clutter free environment maintained   increased rounding and observation   nonskid shoes/slippers when out of bed   patient and family education   room near nurse's station   safety round/check completed   Goal Outcome Evaluation:      Plan of Care Reviewed With: patient    Overall Patient Progress: improvingOverall Patient  Progress: improving

## 2024-06-18 ENCOUNTER — APPOINTMENT (OUTPATIENT)
Dept: PHYSICAL THERAPY | Facility: CLINIC | Age: 74
DRG: 309 | End: 2024-06-18
Attending: STUDENT IN AN ORGANIZED HEALTH CARE EDUCATION/TRAINING PROGRAM
Payer: COMMERCIAL

## 2024-06-18 LAB
MAGNESIUM SERPL-MCNC: 1.6 MG/DL (ref 1.7–2.3)
PHOSPHATE SERPL-MCNC: 3 MG/DL (ref 2.5–4.5)
POTASSIUM SERPL-SCNC: 4.4 MMOL/L (ref 3.4–5.3)

## 2024-06-18 PROCEDURE — 99232 SBSQ HOSP IP/OBS MODERATE 35: CPT | Performed by: STUDENT IN AN ORGANIZED HEALTH CARE EDUCATION/TRAINING PROGRAM

## 2024-06-18 PROCEDURE — 97161 PT EVAL LOW COMPLEX 20 MIN: CPT | Mod: GP | Performed by: PHYSICAL THERAPIST

## 2024-06-18 PROCEDURE — 258N000001 HC RX 258: Performed by: STUDENT IN AN ORGANIZED HEALTH CARE EDUCATION/TRAINING PROGRAM

## 2024-06-18 PROCEDURE — 120N000001 HC R&B MED SURG/OB

## 2024-06-18 PROCEDURE — 83735 ASSAY OF MAGNESIUM: CPT | Performed by: STUDENT IN AN ORGANIZED HEALTH CARE EDUCATION/TRAINING PROGRAM

## 2024-06-18 PROCEDURE — 97116 GAIT TRAINING THERAPY: CPT | Mod: GP | Performed by: PHYSICAL THERAPIST

## 2024-06-18 PROCEDURE — 99222 1ST HOSP IP/OBS MODERATE 55: CPT

## 2024-06-18 PROCEDURE — 250N000013 HC RX MED GY IP 250 OP 250 PS 637: Performed by: INTERNAL MEDICINE

## 2024-06-18 PROCEDURE — 250N000011 HC RX IP 250 OP 636: Performed by: STUDENT IN AN ORGANIZED HEALTH CARE EDUCATION/TRAINING PROGRAM

## 2024-06-18 PROCEDURE — 36415 COLL VENOUS BLD VENIPUNCTURE: CPT | Performed by: STUDENT IN AN ORGANIZED HEALTH CARE EDUCATION/TRAINING PROGRAM

## 2024-06-18 PROCEDURE — 84132 ASSAY OF SERUM POTASSIUM: CPT | Performed by: STUDENT IN AN ORGANIZED HEALTH CARE EDUCATION/TRAINING PROGRAM

## 2024-06-18 PROCEDURE — 97530 THERAPEUTIC ACTIVITIES: CPT | Mod: GP | Performed by: PHYSICAL THERAPIST

## 2024-06-18 PROCEDURE — 84100 ASSAY OF PHOSPHORUS: CPT | Performed by: STUDENT IN AN ORGANIZED HEALTH CARE EDUCATION/TRAINING PROGRAM

## 2024-06-18 PROCEDURE — 250N000013 HC RX MED GY IP 250 OP 250 PS 637: Performed by: STUDENT IN AN ORGANIZED HEALTH CARE EDUCATION/TRAINING PROGRAM

## 2024-06-18 RX ADMIN — GABAPENTIN 900 MG: 300 CAPSULE ORAL at 13:11

## 2024-06-18 RX ADMIN — PANTOPRAZOLE SODIUM 40 MG: 40 TABLET, DELAYED RELEASE ORAL at 17:26

## 2024-06-18 RX ADMIN — FOLIC ACID 1 MG: 1 TABLET ORAL at 09:31

## 2024-06-18 RX ADMIN — PANTOPRAZOLE SODIUM 40 MG: 40 TABLET, DELAYED RELEASE ORAL at 06:45

## 2024-06-18 RX ADMIN — GABAPENTIN 900 MG: 300 CAPSULE ORAL at 04:00

## 2024-06-18 RX ADMIN — METOPROLOL SUCCINATE 50 MG: 50 TABLET, EXTENDED RELEASE ORAL at 09:32

## 2024-06-18 RX ADMIN — Medication 1 TABLET: at 09:31

## 2024-06-18 RX ADMIN — GABAPENTIN 900 MG: 300 CAPSULE ORAL at 19:33

## 2024-06-18 RX ADMIN — DEXTROSE MONOHYDRATE, SODIUM CHLORIDE, SODIUM LACTATE, POTASSIUM CHLORIDE, CALCIUM CHLORIDE: 5; 600; 310; 179; 20 INJECTION, SOLUTION INTRAVENOUS at 07:27

## 2024-06-18 RX ADMIN — THIAMINE HYDROCHLORIDE 500 MG: 100 INJECTION, SOLUTION INTRAMUSCULAR; INTRAVENOUS at 09:32

## 2024-06-18 RX ADMIN — Medication 5 MG: at 21:21

## 2024-06-18 ASSESSMENT — ACTIVITIES OF DAILY LIVING (ADL)
ADLS_ACUITY_SCORE: 38
ADLS_ACUITY_SCORE: 38
ADLS_ACUITY_SCORE: 35
ADLS_ACUITY_SCORE: 38
ADLS_ACUITY_SCORE: 35
ADLS_ACUITY_SCORE: 38
ADLS_ACUITY_SCORE: 39
ADLS_ACUITY_SCORE: 38
ADLS_ACUITY_SCORE: 38
ADLS_ACUITY_SCORE: 39
ADLS_ACUITY_SCORE: 38
ADLS_ACUITY_SCORE: 35

## 2024-06-18 NOTE — CONSULTS
Initial Psychiatric Consult   Consult date: June 18, 2024         Reason for Consult, requesting source:    Severe alcohol use disorder- addiction med consult     Requesting source: Jean-Pierre Carnes    Labs and imaging reviewed. Blake Cruz. PADMINI consult ordered.         HPI:   Rizwana Aguilar is a 73 year old female with a hx notable for alcohol use disorder, paroxysymal afib s/p Watchman's device- not on anticoagulation, HTN, pancreatitis, breast cancer, erosive gastropathy, and mild neurocognitive disorder exacerbated by alcohol use, who presented to the ED on 6/15/24 for assessment of an elevated heart rate, low blood pressure, dizziness, and poor oral intake. Subsequent lab work noted profound acidosis likely related to ongoing alcohol use. She was admitted to medical for ongoing monitoring given electrolyte abnormalities and tachycardia. Magnesium 1.4, anion gap of 31 with bicarbonate of 10, potassium 3.2, ALT 65, AST 87, and ketones greater than 9 with a glucose of 197. No detectable alcohol level upon admission. Psychiatry asked to consult for addiction medicine and recommendations given ongoing alcohol use.     Rizwana was sitting in bed when I came to meet with her.  She tells me that she was able to sleep last night and is feeling better since coming to the hospital.  She tells me her appetite is returning and she has been able to drink juice.  She tells me she came to the hospital because she was feeling nauseous and throwing up.  She tells me that she recently transitioned from living at [facility] to home 3 to 4 weeks ago following rehabilitation for her knee.  She tells me that she uses a cane for walking and is able to navigate her house well.  She lives with her spouse who assists with cooking, household chores, and driving.  She tells me that she no longer drives.  When asked about concerns and reports of recent alcohol use, she tells me that she usually drinks wine with dinner.  She tells me that she  usually drinks 1 to 2 glasses and that she does not believe this is problematic.  She denies any current outpatient mental health supports and denies any current mental health concerns.  She denies feeling depressed and denies feeling anxious.  Denies SI/HI/AH/VH. She is not currently taking any psychiatric medications.  She denies any current symptoms of withdrawal.  She identifies her spouse and then her 2 children and grandchildren as her primary supports.  She tells me that one of her daughters lives in [place] and the other 1 in [place].  She tells me that she has been retired for many years but held many different jobs and lived around the world growing up as her  taught for the [employer].  She says her  will be coming to bring her newspaper this morning.    Her  Blas arrived and Rizwana was agreeable to continue meeting with him present.  Blas provides additional feedback regarding caries recent diagnosis from a neurologist.  He says that she was diagnosed with mild cognitive disorder exacerbated by alcohol use.  He tells me that she drinks at least 2 alcoholic beverages with dinner and then will continue to drink daniel until she falls asleep.  He says that at home she often struggles to eat its and has limited oral intake.  Despite a decreased appetite which he thinks is due to reported ongoing nausea, she will continue to drink alcohol.  He then explains that this leads to more physical discomfort due to stomach ulcers and makes her nausea worse resulting in visits to the hospital.  Rizwana does acknowledge this.  Blas says that she has done well when she is living in more controlled environments in which she does not have access to alcohol.  He says that the family has tried to do interventions and remove alcohol from the house however Rizwana is insistent that she has a drink. It is unclear if family then bought alcohol or how she regained access after it was removed from the house.   "Blas says that they have not found a a overly helpful and that [facility] would not be able to treat her when they inquired last due to TCU needs.  I did discuss my concerns with Rizwana and Isaak about Rizwana's ongoing alcohol use.  Although initially hesitant, Rizwana was agreeable to completing a substance use disorder assessment to ideally obtain additional outpatient supports.  She did request that this assessment be completed tomorrow as she was feeling tired after meeting with me. Her spouse, Blas, offered validation and encouragement to her and told me that she had refused to discuss this in the past yet he feels as though she is open to additional help following our discussion and repeated hospital visits.           Past Psychiatric History:   Patient reports possible past dx of depression. Denies past IP mental health hospitalizations, denies any past suicide attempts. No current OP mental health supports.             Substance Use and History:   Ongoing alcohol use disorder.  reports recent dx by neurology of mild cognitive disorder exacerbated by alcohol use.     Patient denies and previous PADMINI Treatment or supports.    Collateral from  reports patient has previously participated in AA.  reports that patient does well in structured settings in which access to alcohol is limited.  reports patient drinks \"2 alcoholic drinks with dinner\" and then drinks daniel until she falls asleep nightly.         Past Medical History:   PAST MEDICAL HISTORY:   Past Medical History:   Diagnosis Date    Hypertension        PAST SURGICAL HISTORY:   Past Surgical History:   Procedure Laterality Date    GYN SURGERY      IR LUMBAR PUNCTURE  1/16/2024             Family History:   FAMILY HISTORY: No family history on file.        Social History:   SOCIAL HISTORY:   Social History     Tobacco Use    Smoking status: Never    Smokeless tobacco: Never   Substance Use Topics    Alcohol use: Not on file "        and lives with spouse in home. No longer drives. Has to children and 3 grand children. Reports she worked 11+ different jobs and lived internationally as her  used to teach for the .          Physical ROS:   The 10 point Review of Systems is negative other than noted in the HPI or here.           Medications:     Current Facility-Administered Medications   Medication Dose Route Frequency Provider Last Rate Last Admin    folic acid (FOLVITE) tablet 1 mg  1 mg Oral Daily Jean-Pierre Carnes MD   1 mg at 06/17/24 0813    [START ON 6/23/2024] gabapentin (NEURONTIN) capsule 100 mg  100 mg Oral Q8H Staci Cruz, DO        [START ON 6/21/2024] gabapentin (NEURONTIN) capsule 300 mg  300 mg Oral Q8H Staci Cruz, DO        [START ON 6/19/2024] gabapentin (NEURONTIN) capsule 600 mg  600 mg Oral Q8H Staci Cruz, DO        gabapentin (NEURONTIN) capsule 900 mg  900 mg Oral Q8H Staci Cruz DO   900 mg at 06/18/24 0400    metoprolol succinate ER (TOPROL XL) 24 hr tablet 50 mg  50 mg Oral Daily Staci Cruz DO   50 mg at 06/17/24 1854    multivitamin w/minerals (THERA-VIT-M) tablet 1 tablet  1 tablet Oral Daily Jean-Pierre Carnes MD   1 tablet at 06/17/24 0812    pantoprazole (PROTONIX) EC tablet 40 mg  40 mg Oral BID AC Jean-Pierre Carnes MD   40 mg at 06/18/24 0645    sodium chloride (PF) 0.9% PF flush 3 mL  3 mL Intravenous Q8H Staci Cruz DO   3 mL at 06/17/24 2251    sodium chloride (PF) 0.9% PF flush 3 mL  3 mL Intracatheter Q8H Jean-Pierre Carnes MD   3 mL at 06/16/24 0352    thiamine (B-1) injection 500 mg  500 mg Intravenous TID Staci Cruz DO   500 mg at 06/17/24 2248    Followed by    [START ON 6/19/2024] thiamine (B-1) injection 250 mg  250 mg Intravenous Daily Staci Cruz DO        Followed by    [START ON 6/24/2024] thiamine (B-1) tablet 100 mg  100 mg Oral Daily Staci Cruz                   Allergies:     Allergies   Allergen Reactions     Penicillins Unknown          Labs:     Recent Results (from the past 48 hour(s))   Potassium    Collection Time: 06/16/24  3:32 PM   Result Value Ref Range    Potassium 4.2 3.4 - 5.3 mmol/L   Magnesium    Collection Time: 06/17/24  6:13 AM   Result Value Ref Range    Magnesium 1.3 (L) 1.7 - 2.3 mg/dL   Phosphorus    Collection Time: 06/17/24  6:13 AM   Result Value Ref Range    Phosphorus 1.6 (L) 2.5 - 4.5 mg/dL   Potassium    Collection Time: 06/17/24  6:13 AM   Result Value Ref Range    Potassium 3.9 3.4 - 5.3 mmol/L   Basic metabolic panel    Collection Time: 06/17/24  6:13 AM   Result Value Ref Range    Sodium 139 135 - 145 mmol/L    Potassium 3.9 3.4 - 5.3 mmol/L    Chloride 109 (H) 98 - 107 mmol/L    Carbon Dioxide (CO2) 18 (L) 22 - 29 mmol/L    Anion Gap 12 7 - 15 mmol/L    Urea Nitrogen 6.6 (L) 8.0 - 23.0 mg/dL    Creatinine 0.49 (L) 0.51 - 0.95 mg/dL    GFR Estimate >90 >60 mL/min/1.73m2    Calcium 9.0 8.8 - 10.2 mg/dL    Glucose 171 (H) 70 - 99 mg/dL   Magnesium    Collection Time: 06/17/24  5:50 PM   Result Value Ref Range    Magnesium 2.2 1.7 - 2.3 mg/dL   Phosphorus    Collection Time: 06/18/24  2:16 AM   Result Value Ref Range    Phosphorus 3.0 2.5 - 4.5 mg/dL   Potassium    Collection Time: 06/18/24  8:03 AM   Result Value Ref Range    Potassium 4.4 3.4 - 5.3 mmol/L   Magnesium    Collection Time: 06/18/24  8:03 AM   Result Value Ref Range    Magnesium 1.6 (L) 1.7 - 2.3 mg/dL          Physical and Psychiatric Examination:     /69 (BP Location: Right arm)   Pulse 67   Temp 99.3  F (37.4  C) (Oral)   Resp 20   Wt 56.2 kg (123 lb 14.4 oz)   SpO2 100%   BMI 20.00 kg/m    Weight is 123 lbs 14.38 oz  Body mass index is 20 kg/m .    Physical Exam:  I have reviewed the physical exam as documented by by the medical team and agree with findings and assessment and have no additional findings to add at this time.    Mental Status Exam:    Appearance: awake, alert, adequately groomed, dressed in  "hospital scrubs, and appeared as age stated  Attitude:  cooperative  Eye Contact:  fair  Mood:   'I'm fine\"  Affect:  mood congruent  Speech:  clear, coherent and normal prosody  Psychomotor Behavior:  no evidence of tardive dyskinesia, dystonia, or tics  Thought Process:  linear  Associations:  no loose associations  Thought Content:  no evidence of suicidal ideation or homicidal ideation and no evidence of psychotic thought  Insight:  limited  Judgement:  fair  Oriented to:  time, person, and place  Attention Span and Concentration:  fair  Recent and Remote Memory:  fair               DSM-5 Diagnosis:   Substance-Related & Addictive Disorders Alcohol Use Disorder   303.90 (F10.20) Severe dependence   Mild neurocognitive disorder exacerbated by alcohol use           Assessment:   Rizwana Aguilar is a 73 year old female with a hx notable for alcohol use disorder, paroxysymal afib s/p Watchman's device- not on anticoagulation, HTN, pancreatitis, breast cancer, erosive gastropathy, and mild neurocognitive disorder exacerbated by alcohol use, who presented to the ED on 6/15/24 for assessment of an elevated heart rate, low blood pressure, dizziness, and poor oral intake. Subsequent lab work noted profound acidosis likely related to ongoing alcohol use. She was admitted to medical for ongoing monitoring given electrolyte abnormalities and tachycardia. Current presentation does appear similar to past presentations and is largely due to ongoing alcohol use.     Rizwana was agreeable to meet with me, she continues to minimize her alcohol use yet did acknowledge it has lead to her to the hospital many times. She denies all mental health symptoms. Her spouse provides significant collateral information and discusses how she has done quite well in controlled settings and when she does not have access to alcohol. Family has attempted to remove alcohol from the home following several previous admissions. Discussed recommendations to " complete PADMINI assessment for additional OP resources which Rizwana was agreeable to. Spouse reports that he is willing to remove alcohol from the household to minimize access yet he feels as though she would benefit from additional OP supports. Rizwana declines any medications targeting cravings. Denies SI/HI and no evidence of psychosis. Patient has established neurologist and should continue following with them OP for ongoing management of neurocognitive disorder.           Summary of Recommendations:   Abstain from alcohol use  PADMINI assessment ordered, patient is interested in OP resources  Spouse/family report patient has done well in controlled environments with limited access to alcohol, spouse willing to remove alcohol from the home.  Follow-up with established OP neurologist following discharge  At time of assessment patient does not warrant inpatient mental health stabilization. Psychiatrically cleared to discharge once medically stable         BELGICA Cameron CNP    Consult/Liaison Psychiatry   [facility]    Contact information available via [facility] Paging/Directory  If I am not available, then [facility] CL line ([phone]) should know who is covering our consult service.

## 2024-06-18 NOTE — PLAN OF CARE
"VSS on RA. A&Ox4. Denies pain. Pt reports dyspnea on exertion. K, Mg, Phos protocol. D5 LR w/20 KCl infusing at 100 ml/hr. 1 incontinent BM. CIWA 0, 0, 1. Ax1 GB walker. Discharge TBD.    Goal Outcome Evaluation:      Plan of Care Reviewed With: patient    Overall Patient Progress: improving    Outcome Evaluation: No longer on tele. BP stable. Dyspnea on exertion.    Problem: Adult Inpatient Plan of Care  Goal: Plan of Care Review  Description: The Plan of Care Review/Shift note should be completed every shift.  The Outcome Evaluation is a brief statement about your assessment that the patient is improving, declining, or no change.  This information will be displayed automatically on your shift  note.  Outcome: Progressing  Flowsheets (Taken 6/18/2024 0608)  Outcome Evaluation: No longer on tele. BP stable. Dyspnea on exertion.  Plan of Care Reviewed With: patient  Overall Patient Progress: improving  Goal: Patient-Specific Goal (Individualized)  Description: You can add care plan individualizations to a care plan. Examples of Individualization might be:  \"Parent requests to be called daily at 9am for status\", \"I have a hard time hearing out of my right ear\", or \"Do not touch me to wake me up as it startles  me\".  Outcome: Progressing  Goal: Absence of Hospital-Acquired Illness or Injury  Outcome: Progressing  Intervention: Identify and Manage Fall Risk  Recent Flowsheet Documentation  Taken 6/17/2024 2043 by Carey Wilson RN  Safety Promotion/Fall Prevention:   assistive device/personal items within reach   clutter free environment maintained   increased rounding and observation   increase visualization of patient   lighting adjusted   mobility aid in reach   nonskid shoes/slippers when out of bed   patient and family education   room organization consistent   safety round/check completed   treat underlying cause  Intervention: Prevent Skin Injury  Recent Flowsheet Documentation  Taken 6/17/2024 2043 by " Carey Wilson RN  Body Position: position changed independently  Intervention: Prevent and Manage VTE (Venous Thromboembolism) Risk  Recent Flowsheet Documentation  Taken 6/17/2024 2043 by Carey Wilson RN  VTE Prevention/Management: SCDs (sequential compression devices) off  Intervention: Prevent Infection  Recent Flowsheet Documentation  Taken 6/17/2024 2043 by Carey Wilson RN  Infection Prevention:   hand hygiene promoted   rest/sleep promoted   single patient room provided  Goal: Optimal Comfort and Wellbeing  Outcome: Progressing  Goal: Readiness for Transition of Care  Outcome: Progressing     Problem: Dysrhythmia  Goal: Normalized Cardiac Rhythm  Outcome: Progressing  Intervention: Monitor and Manage Cardiac Rhythm Effect  Recent Flowsheet Documentation  Taken 6/17/2024 2043 by Carey Wilson RN  VTE Prevention/Management: SCDs (sequential compression devices) off     Problem: Skin Injury Risk Increased  Goal: Skin Health and Integrity  Outcome: Progressing  Intervention: Plan: Nurse Driven Intervention: Moisture Management  Recent Flowsheet Documentation  Taken 6/17/2024 2043 by Carey Wilson RN  Moisture Interventions:   Encourage regular toileting   Incontinence pad  Intervention: Plan: Nurse Driven Intervention: Friction and Shear  Recent Flowsheet Documentation  Taken 6/17/2024 2043 by Carey Wilson RN  Friction/Shear Interventions: HOB 30 degrees or less  Intervention: Optimize Skin Protection  Recent Flowsheet Documentation  Taken 6/17/2024 2043 by Carey Wilson RN  Head of Bed (HOB) Positioning: HOB at 30-45 degrees     Problem: Heart Failure  Goal: Optimal Coping  Outcome: Progressing  Goal: Optimal Cardiac Output  Outcome: Progressing  Goal: Stable Heart Rate and Rhythm  Outcome: Progressing  Goal: Optimal Functional Ability  Outcome: Progressing  Goal: Fluid and Electrolyte Balance  Outcome: Progressing  Goal: Improved Oral Intake  Outcome: Progressing  Goal:  Effective Oxygenation and Ventilation  Outcome: Progressing  Intervention: Promote Airway Secretion Clearance  Recent Flowsheet Documentation  Taken 6/17/2024 2043 by Carey Wilson RN  Cough And Deep Breathing: done independently per patient  Intervention: Optimize Oxygenation and Ventilation  Recent Flowsheet Documentation  Taken 6/17/2024 2043 by Carey Wilson RN  Head of Bed (HOB) Positioning: HOB at 30-45 degrees  Goal: Effective Breathing Pattern During Sleep  Outcome: Progressing  Intervention: Monitor and Manage Obstructive Sleep Apnea  Recent Flowsheet Documentation  Taken 6/17/2024 2043 by Carey Wilson RN  Medication Review/Management: medications reviewed     Problem: Comorbidity Management  Goal: Blood Pressure in Desired Range  Outcome: Progressing  Intervention: Maintain Blood Pressure Management  Recent Flowsheet Documentation  Taken 6/17/2024 2043 by Carey Wilson RN  Medication Review/Management: medications reviewed     Problem: Fall Injury Risk  Goal: Absence of Fall and Fall-Related Injury  Outcome: Progressing  Intervention: Identify and Manage Contributors  Recent Flowsheet Documentation  Taken 6/17/2024 2043 by Carey Wilson RN  Medication Review/Management: medications reviewed  Intervention: Promote Injury-Free Environment  Recent Flowsheet Documentation  Taken 6/17/2024 2043 by Carey Wilson RN  Safety Promotion/Fall Prevention:   assistive device/personal items within reach   clutter free environment maintained   increased rounding and observation   increase visualization of patient   lighting adjusted   mobility aid in reach   nonskid shoes/slippers when out of bed   patient and family education   room organization consistent   safety round/check completed   treat underlying cause

## 2024-06-18 NOTE — PROGRESS NOTES
06/18/24 1200   Appointment Info   Signing Clinician's Name / Credentials (PT) Jessi Estevez PT   Living Environment   People in Home spouse   Current Living Arrangements house   Self-Care   Usual Activity Tolerance moderate   Current Activity Tolerance fair   Equipment Currently Used at Home cane, quad   Fall history within last six months yes   Number of times patient has fallen within last six months 2   Activity/Exercise/Self-Care Comment reports indep w ADLS, using quad cane at home   General Information   Onset of Illness/Injury or Date of Surgery 06/17/24   Pertinent History of Current Problem (include personal factors and/or comorbidities that impact the POC) 73 year old female with PMH significant for paroxysymal afib s/p Watchman's device and not on anticoagulation, HTN, alcohol use disorder with daily alcohol use, alcoholic liver disease, pancreatitis, breast cancer, memory loss, erosive gastropathy who presented to the ER today for concerns about dizziness and difficult time getting up/weakness.  No loss of consciousness.  She endorses poor oral intake recently   Existing Precautions/Restrictions fall   Cognition   Affect/Mental Status (Cognition) flat/blunted affect   Orientation Status (Cognition) oriented x 3   Follows Commands (Cognition) follows one-step commands;75-90% accuracy   Safety Deficit (Cognition) ability to follow commands;at risk behavior observed   Posture    Posture Forward head position;Protracted shoulders   Range of Motion (ROM)   Range of Motion ROM is WFL   Strength (Manual Muscle Testing)   Strength (Manual Muscle Testing) Deficits observed during functional mobility   Bed Mobility   Comment, (Bed Mobility) CGA HOB elevated bed rails   Transfers   Comment, (Transfers) min A from EOE SEC poor usage of AD   Gait/Stairs (Locomotion)   Comment, (Gait/Stairs) amb into BR w min A/HHA and quad cane, freq not placing cane on floor, small steps w general unsteadiness   Balance    Balance Comments impaired standing and walking, req UE support,uses QC which seems insufficient   Clinical Impression   Criteria for Skilled Therapeutic Intervention Yes, treatment indicated   PT Diagnosis (PT) Difficulty walking   Influenced by the following impairments impaired gait, dec indep transfer dec strength   Functional limitations due to impairments impaired mobility   Clinical Presentation (PT Evaluation Complexity) evolving   Clinical Decision Making (Complexity) low complexity   Planned Therapy Interventions (PT) balance training;bed mobility training;gait training;neuromuscular re-education;patient/family education;stair training;strengthening;transfer training;progressive activity/exercise   Risk & Benefits of therapy have been explained evaluation/treatment results reviewed;care plan/treatment goals reviewed;risks/benefits reviewed   PT Discharge Planning   PT Discharge Recommendation (DC Rec) Transitional Care Facility   PT Rationale for DC Rec Pt mobilizing below a mod indep baseline with QC, unsteady and at risk of falling w/o A, will benefit from PT during stay with cont PT in rehab setting.

## 2024-06-19 LAB
HOLD SPECIMEN: NORMAL
MAGNESIUM SERPL-MCNC: 1.5 MG/DL (ref 1.7–2.3)
MAGNESIUM SERPL-MCNC: 2.3 MG/DL (ref 1.7–2.3)
PHOSPHATE SERPL-MCNC: 3.4 MG/DL (ref 2.5–4.5)
POTASSIUM SERPL-SCNC: 4 MMOL/L (ref 3.4–5.3)

## 2024-06-19 PROCEDURE — 250N000011 HC RX IP 250 OP 636: Mod: JZ | Performed by: INTERNAL MEDICINE

## 2024-06-19 PROCEDURE — 84100 ASSAY OF PHOSPHORUS: CPT | Performed by: STUDENT IN AN ORGANIZED HEALTH CARE EDUCATION/TRAINING PROGRAM

## 2024-06-19 PROCEDURE — 36415 COLL VENOUS BLD VENIPUNCTURE: CPT | Performed by: STUDENT IN AN ORGANIZED HEALTH CARE EDUCATION/TRAINING PROGRAM

## 2024-06-19 PROCEDURE — 36415 COLL VENOUS BLD VENIPUNCTURE: CPT | Performed by: INTERNAL MEDICINE

## 2024-06-19 PROCEDURE — 120N000001 HC R&B MED SURG/OB

## 2024-06-19 PROCEDURE — 250N000011 HC RX IP 250 OP 636: Performed by: STUDENT IN AN ORGANIZED HEALTH CARE EDUCATION/TRAINING PROGRAM

## 2024-06-19 PROCEDURE — 250N000013 HC RX MED GY IP 250 OP 250 PS 637: Performed by: STUDENT IN AN ORGANIZED HEALTH CARE EDUCATION/TRAINING PROGRAM

## 2024-06-19 PROCEDURE — 83735 ASSAY OF MAGNESIUM: CPT | Performed by: STUDENT IN AN ORGANIZED HEALTH CARE EDUCATION/TRAINING PROGRAM

## 2024-06-19 PROCEDURE — 99232 SBSQ HOSP IP/OBS MODERATE 35: CPT | Performed by: INTERNAL MEDICINE

## 2024-06-19 PROCEDURE — 83735 ASSAY OF MAGNESIUM: CPT | Performed by: INTERNAL MEDICINE

## 2024-06-19 PROCEDURE — 84132 ASSAY OF SERUM POTASSIUM: CPT | Performed by: STUDENT IN AN ORGANIZED HEALTH CARE EDUCATION/TRAINING PROGRAM

## 2024-06-19 PROCEDURE — 250N000013 HC RX MED GY IP 250 OP 250 PS 637: Performed by: INTERNAL MEDICINE

## 2024-06-19 RX ORDER — MAGNESIUM SULFATE HEPTAHYDRATE 40 MG/ML
4 INJECTION, SOLUTION INTRAVENOUS ONCE
Status: COMPLETED | OUTPATIENT
Start: 2024-06-19 | End: 2024-06-19

## 2024-06-19 RX ADMIN — FOLIC ACID 1 MG: 1 TABLET ORAL at 08:43

## 2024-06-19 RX ADMIN — PANTOPRAZOLE SODIUM 40 MG: 40 TABLET, DELAYED RELEASE ORAL at 06:15

## 2024-06-19 RX ADMIN — GABAPENTIN 600 MG: 300 CAPSULE ORAL at 20:15

## 2024-06-19 RX ADMIN — GABAPENTIN 900 MG: 300 CAPSULE ORAL at 11:59

## 2024-06-19 RX ADMIN — THIAMINE HYDROCHLORIDE 250 MG: 100 INJECTION, SOLUTION INTRAMUSCULAR; INTRAVENOUS at 08:39

## 2024-06-19 RX ADMIN — Medication 5 MG: at 20:15

## 2024-06-19 RX ADMIN — METOPROLOL SUCCINATE 50 MG: 50 TABLET, EXTENDED RELEASE ORAL at 08:43

## 2024-06-19 RX ADMIN — PANTOPRAZOLE SODIUM 40 MG: 40 TABLET, DELAYED RELEASE ORAL at 16:57

## 2024-06-19 RX ADMIN — Medication 1 TABLET: at 08:43

## 2024-06-19 RX ADMIN — GABAPENTIN 900 MG: 300 CAPSULE ORAL at 03:35

## 2024-06-19 RX ADMIN — MAGNESIUM SULFATE HEPTAHYDRATE 4 G: 4 INJECTION, SOLUTION INTRAVENOUS at 08:45

## 2024-06-19 ASSESSMENT — ACTIVITIES OF DAILY LIVING (ADL)
ADLS_ACUITY_SCORE: 37
ADLS_ACUITY_SCORE: 35
ADLS_ACUITY_SCORE: 37
ADLS_ACUITY_SCORE: 35
ADLS_ACUITY_SCORE: 37
ADLS_ACUITY_SCORE: 35
ADLS_ACUITY_SCORE: 37
ADLS_ACUITY_SCORE: 35
ADLS_ACUITY_SCORE: 35
ADLS_ACUITY_SCORE: 37
ADLS_ACUITY_SCORE: 35
ADLS_ACUITY_SCORE: 37
ADLS_ACUITY_SCORE: 35
ADLS_ACUITY_SCORE: 37
ADLS_ACUITY_SCORE: 35
ADLS_ACUITY_SCORE: 35
ADLS_ACUITY_SCORE: 37
ADLS_ACUITY_SCORE: 35

## 2024-06-19 NOTE — PLAN OF CARE
"Vss, no co pain/cp/sob. Alarms on for safety as pt can be impulsive and not call for assistance at times, forgetful, falls history PTA, up with Ax1+gb+cane, PT following, gen wkns. K+/Phos WDL with rechecks ordered for am, mag replaced with recheck ordered for 1400. Scattered bruising and scabs noted to lips, moisturizer applied. Pt declined CD and TCU. Continue poc and monitoring.     2:51 PM  Mag 2.3, recheck ordered for am.      Goal Outcome Evaluation:      Plan of Care Reviewed With: patient    Overall Patient Progress: improvingOverall Patient Progress: improving    Outcome Evaluation: refuses CD and TCU, possible d/c later today or tomorrow.      Problem: Adult Inpatient Plan of Care  Goal: Plan of Care Review  Description: The Plan of Care Review/Shift note should be completed every shift.  The Outcome Evaluation is a brief statement about your assessment that the patient is improving, declining, or no change.  This information will be displayed automatically on your shift  note.  Outcome: Progressing  Flowsheets (Taken 6/19/2024 1306)  Outcome Evaluation: refuses CD and TCU, possible d/c later today or tomorrow.  Plan of Care Reviewed With: patient  Overall Patient Progress: improving  Goal: Patient-Specific Goal (Individualized)  Description: You can add care plan individualizations to a care plan. Examples of Individualization might be:  \"Parent requests to be called daily at 9am for status\", \"I have a hard time hearing out of my right ear\", or \"Do not touch me to wake me up as it startles  me\".  Outcome: Progressing  Goal: Absence of Hospital-Acquired Illness or Injury  Outcome: Progressing  Intervention: Identify and Manage Fall Risk  Recent Flowsheet Documentation  Taken 6/19/2024 1304 by Aruna Zavala, RN  Safety Promotion/Fall Prevention: safety round/check completed  Taken 6/19/2024 1202 by Aruna Zavala, RN  Safety Promotion/Fall Prevention: safety round/check completed  Taken 6/19/2024 1115 " by Schweim, Aruna K, RN  Safety Promotion/Fall Prevention: safety round/check completed  Taken 6/19/2024 1030 by Aruna Zavala RN  Safety Promotion/Fall Prevention: safety round/check completed  Taken 6/19/2024 0901 by Aruna Zavala RN  Safety Promotion/Fall Prevention: safety round/check completed  Taken 6/19/2024 0837 by Aruna Zavala RN  Safety Promotion/Fall Prevention:   activity supervised   assistive device/personal items within reach   treat underlying cause   treat reversible contributory factors   supervised activity   safety round/check completed   room organization consistent   room near nurse's station   patient and family education   nonskid shoes/slippers when out of bed   mobility aid in reach   lighting adjusted   increase visualization of patient   increased rounding and observation   clutter free environment maintained  Taken 6/19/2024 0728 by Aruna Zavala RN  Safety Promotion/Fall Prevention: safety round/check completed  Taken 6/19/2024 0726 by Aruna Zavala RN  Safety Promotion/Fall Prevention: safety round/check completed  Intervention: Prevent Skin Injury  Recent Flowsheet Documentation  Taken 6/19/2024 0837 by Aruna Zavala RN  Body Position: position changed independently  Intervention: Prevent and Manage VTE (Venous Thromboembolism) Risk  Recent Flowsheet Documentation  Taken 6/19/2024 1115 by Aruna Zavala RN  VTE Prevention/Management: (reminded DALE CHOI to apply PCDs) other (see comments)  Taken 6/19/2024 0837 by Aruna Zavala RN  VTE Prevention/Management: (delegated to DALE to place on the pt) other (see comments)  Goal: Optimal Comfort and Wellbeing  Outcome: Progressing  Goal: Readiness for Transition of Care  Outcome: Progressing     Problem: Dysrhythmia  Goal: Normalized Cardiac Rhythm  Outcome: Progressing  Intervention: Monitor and Manage Cardiac Rhythm Effect  Recent Flowsheet Documentation  Taken 6/19/2024 1115 by Aruna Zavala RN  VTE  Prevention/Management: (reminded DALE Dwight CHOI to apply PCDs) other (see comments)  Taken 6/19/2024 0837 by Aruna Zavala RN  VTE Prevention/Management: (delegated to NA to place on the pt) other (see comments)     Problem: Skin Injury Risk Increased  Goal: Skin Health and Integrity  Outcome: Progressing  Intervention: Optimize Skin Protection  Recent Flowsheet Documentation  Taken 6/19/2024 0837 by Aruna Zavala RN  Pressure Reduction Techniques: weight shift assistance provided  Activity Management:   activity adjusted per tolerance   activity encouraged  Head of Bed (HOB) Positioning: HOB at 20-30 degrees     Problem: Comorbidity Management  Goal: Blood Pressure in Desired Range  Outcome: Progressing  Intervention: Maintain Blood Pressure Management  Recent Flowsheet Documentation  Taken 6/19/2024 0837 by Aruna Zavala RN  Medication Review/Management:   medications reviewed   high-risk medications identified     Problem: Fall Injury Risk  Goal: Absence of Fall and Fall-Related Injury  Outcome: Progressing  Intervention: Identify and Manage Contributors  Recent Flowsheet Documentation  Taken 6/19/2024 0837 by Aruna Zavala RN  Medication Review/Management:   medications reviewed   high-risk medications identified  Intervention: Promote Injury-Free Environment  Recent Flowsheet Documentation  Taken 6/19/2024 1304 by Aruna Zavala RN  Safety Promotion/Fall Prevention: safety round/check completed  Taken 6/19/2024 1202 by Aruna Zavala RN  Safety Promotion/Fall Prevention: safety round/check completed  Taken 6/19/2024 1115 by Aruna Zavala RN  Safety Promotion/Fall Prevention: safety round/check completed  Taken 6/19/2024 1030 by Aruna Zavala RN  Safety Promotion/Fall Prevention: safety round/check completed  Taken 6/19/2024 0901 by Aruna Zavala RN  Safety Promotion/Fall Prevention: safety round/check completed  Taken 6/19/2024 0837 by Aruna Zavala RN  Safety Promotion/Fall  Prevention:   activity supervised   assistive device/personal items within reach   treat underlying cause   treat reversible contributory factors   supervised activity   safety round/check completed   room organization consistent   room near nurse's station   patient and family education   nonskid shoes/slippers when out of bed   mobility aid in reach   lighting adjusted   increase visualization of patient   increased rounding and observation   clutter free environment maintained  Taken 6/19/2024 0728 by Aruna Zavala, RN  Safety Promotion/Fall Prevention: safety round/check completed  Taken 6/19/2024 0726 by Aruna Zavala, RN  Safety Promotion/Fall Prevention: safety round/check completed

## 2024-06-19 NOTE — CONSULTS
"Met with pt for CD Consult and introduced self and role in pt's care.  Inquired about pt's interest in PADMINI Assessment for referrals to Tx or any additional community resources.  Pt reports she is \"not interested\" in any CD services and declined all PADMINI assistance.    Relayed to pt that if they change their mind while admitted and would like to complete a CD Assessment for referrals to treatment or need any additional CD Resources they may alert unit staff who will assist in having CD Consult re-ordered.  If pt has active insurance they may also schedule an assessment on an outpatient basis by calling [facility] at [phone].    TRUDY London, [facility]  Substance Use Disorder Evaluation Counselor  Email: [email or web address]    "

## 2024-06-19 NOTE — PROGRESS NOTES
[facility]    Medicine Progress Note - Hospitalist Service    Date of Admission:  6/15/2024    Assessment & Plan     Ms. Aguilar is a 73 year old female with PMH significant for paroxysymal afib s/p Watchman's device and not on anticoagulation, HTN, alcohol use disorder with daily alcohol use, alcoholic liver disease, pancreatitis, breast cancer, memory loss, erosive gastropathy who presented to the ER today for concerns about dizziness and difficult time getting up/weakness.  No loss of consciousness.  She endorses poor oral intake recently.  Found to have a supraventricular tachycardia with multiple electrolyte abnormalities.    Atrial fibrillation with rapid ventricular response.  Previous watchman's procedure.  -Initially requiring continuous IV diltiazem infusion.  -Heart rate now under much better control.  -Continue metoprolol succinate 50 mg a day.  -Watchman device well-seated per cardiology.    Alcohol use disorder.  Acute alcohol withdrawals.  -Needs long-term alcohol cessation.  -Has been on gabapentin taper protocol.  -Continue multivitamin, folic acid, thiamine.    Hypokalemia.  Hypomagnesemia.  Hypophosphatemia.  -Potassium replacement protocol.  -Magnesium replacement protocol.  -Phosphorus replacement protocol.    Moderate malnutrition.  Starvation ketosis.  -Appreciate registered dietitian input.  -Continue diet supplements.    GERD.  -Continue pantoprazole 40 mg twice a day.    Deconditioning.  Previous mechanical falls.  -Continue to work with therapy.            Diet: Regular Diet Adult  Snacks/Supplements Adult: Ensure Enlive; Between Meals    DVT Prophylaxis: Pneumatic Compression Devices  Billings Catheter: Not present  Lines: None     Cardiac Monitoring: None  Code Status: Full Code      Clinically Significant Risk Factors            # Hypomagnesemia: Lowest Mg = 1.5 mg/dL in last 2 days, will replace as needed                     # Moderate Malnutrition: based on nutrition  assessment           Disposition Plan     Medically Ready for Discharge: Anticipated Tomorrow             Erwin Pulido DO  Hospitalist Service  [facility]  Securely message with [employer] (more info)  Text page via [employer] Paging/Directory   ______________________________________________________________________    Interval History   Denies chest pain, shortness of breath, fevers, chills, nausea, vomiting.    Physical Exam   Vital Signs: Temp: 97.7  F (36.5  C) Temp src: Axillary BP: (!) 141/77 Pulse: 77   Resp: 16 SpO2: 94 % O2 Device: None (Room air)    Weight: 123 lbs 14.38 oz    Gen:  NAD, A&Ox3.  Eyes:  PERRL, sclera anicteric.  OP:  MMM, no lesions.  Neck:  Supple.  CV: Fairly regular, no loud murmurs.  Lung:  CTA b/l, normal effort.  Ab:  +BS, soft.  Skin:  Warm, dry to touch.  No rash.  Ext:  No pitting edema LE b/l.      Medical Decision Making       40 MINUTES SPENT BY ME on the date of service doing chart review, history, exam, documentation & further activities per the note.      Data     I have personally reviewed the following data over the past 24 hrs:    N/A  \   N/A   / N/A     N/A N/A N/A /  N/A   4.0 N/A N/A \       Imaging results reviewed over the past 24 hrs:   No results found for this or any previous visit (from the past 24 hour(s)).

## 2024-06-19 NOTE — PLAN OF CARE
"VSS, denies pian, A1 cane, regular diet. AO x4 very forgetful, RA denies SOB, apical pulse regular. Continent of bowel and bladder, K4.4 Mg1.6 P3.0 AM draw. Rehab and psych consulted today (see notes), plan to discharge as soon as tomorrow      Goal Outcome Evaluation:    Plan of Care Reviewed With: patient, spouse  Overall Patient Progress: improvingOverall Patient Progress: improving  Outcome Evaluation: CIWAs discontinued, possible discharge tomorrow    Problem: Adult Inpatient Plan of Care  Goal: Plan of Care Review  Description: The Plan of Care Review/Shift note should be completed every shift.  The Outcome Evaluation is a brief statement about your assessment that the patient is improving, declining, or no change.  This information will be displayed automatically on your shift  note.  Outcome: Progressing  Flowsheets (Taken 6/18/2024 1900)  Outcome Evaluation: CIWAs discontinued, possible discharge tomorrow  Plan of Care Reviewed With:   patient   spouse  Overall Patient Progress: improving  Goal: Patient-Specific Goal (Individualized)  Description: You can add care plan individualizations to a care plan. Examples of Individualization might be:  \"Parent requests to be called daily at 9am for status\", \"I have a hard time hearing out of my right ear\", or \"Do not touch me to wake me up as it startles  me\".  Outcome: Progressing  Goal: Absence of Hospital-Acquired Illness or Injury  Outcome: Progressing  Intervention: Identify and Manage Fall Risk  Recent Flowsheet Documentation  Taken 6/18/2024 0900 by Bebe Grayson, RN  Safety Promotion/Fall Prevention:   safety round/check completed   supervised activity  Goal: Optimal Comfort and Wellbeing  Outcome: Progressing  Goal: Readiness for Transition of Care  Outcome: Progressing     Problem: Dysrhythmia  Goal: Normalized Cardiac Rhythm  Outcome: Progressing     Problem: Skin Injury Risk Increased  Goal: Skin Health and Integrity  Outcome: Progressing     Problem: " Heart Failure  Goal: Optimal Coping  Outcome: Progressing  Goal: Optimal Cardiac Output  Outcome: Progressing  Goal: Stable Heart Rate and Rhythm  Outcome: Progressing  Goal: Optimal Functional Ability  Outcome: Progressing  Goal: Fluid and Electrolyte Balance  Outcome: Progressing  Goal: Improved Oral Intake  Outcome: Progressing  Goal: Effective Oxygenation and Ventilation  Outcome: Progressing  Goal: Effective Breathing Pattern During Sleep  Outcome: Progressing     Problem: Comorbidity Management  Goal: Blood Pressure in Desired Range  Outcome: Progressing     Problem: Fall Injury Risk  Goal: Absence of Fall and Fall-Related Injury  Outcome: Progressing  Intervention: Promote Injury-Free Environment  Recent Flowsheet Documentation  Taken 6/18/2024 0900 by Bebe Grayson, RN  Safety Promotion/Fall Prevention:   safety round/check completed   supervised activity

## 2024-06-19 NOTE — PLAN OF CARE
"Goal Outcome Evaluation:    Time: 1900 - 0730  Reason for Admission: A-fib with RVR, fall, and weakness  Activity: Assist x1 using a cane, pt needs frequent encouragement to move and OOB.   Neuro: A&O x3, forgetful at times. CMS intact. Denies any numbness or tingling.   Cardiac: WDL; Denies any chest pain or palpations.   Respiratory: Lung sounds diminished, has an infrequent non-productive cough. O2 sat 96% on room air.   GI/: Pt continue of urine, was incontinent of stool x1 this evening.   Skin: See flowsheet. Pt noted to be bite lower lip at times, has old scabs in place. Pt reported to writer she will at times pick her skin. Lip balm given to help moisturize her lips, pt declines any other interventions.   Diet: Regular diet, tolerating PO intake  IV Access: 22 gauge in the right forearm. Saline locked.   Vitals: /62 (BP Location: Right arm, Patient Position: Semi-Motley's, Cuff Size: Adult Small)   Pulse 76   Temp 98  F (36.7  C) (Oral)   Resp 16   Wt 56.2 kg (123 lb 14.4 oz)   SpO2 99%   BMI 20.00 kg/m    Pain: Denies any pain.   Plan: TCU vs home. Chem dep consult pending. Pt has reported not being interested in treatment for ETOH.     Problem: Adult Inpatient Plan of Care  Goal: Plan of Care Review  Description: The Plan of Care Review/Shift note should be completed every shift.  The Outcome Evaluation is a brief statement about your assessment that the patient is improving, declining, or no change.  This information will be displayed automatically on your shift  note.  Outcome: Progressing  Flowsheets (Taken 6/19/2024 0348)  Outcome Evaluation: Denies any dizziness, pt's denies any pain, able to make her needs be known. Plan TCU vs home.  Goal: Patient-Specific Goal (Individualized)  Description: You can add care plan individualizations to a care plan. Examples of Individualization might be:  \"Parent requests to be called daily at 9am for status\", \"I have a hard time hearing out of my right " "ear\", or \"Do not touch me to wake me up as it startles  me\".  Outcome: Progressing  Goal: Absence of Hospital-Acquired Illness or Injury  Outcome: Progressing  Intervention: Identify and Manage Fall Risk  Recent Flowsheet Documentation  Taken 6/19/2024 0128 by Brenda Durham  Safety Promotion/Fall Prevention:   assistive device/personal items within reach   clutter free environment maintained   nonskid shoes/slippers when out of bed   safety round/check completed  Taken 6/18/2024 2124 by Brenda Durham  Safety Promotion/Fall Prevention:   assistive device/personal items within reach   clutter free environment maintained   nonskid shoes/slippers when out of bed   safety round/check completed  Intervention: Prevent Skin Injury  Recent Flowsheet Documentation  Taken 6/19/2024 0128 by Brenda Durham  Body Position: position changed independently  Device Skin Pressure Protection: adhesive use limited  Taken 6/18/2024 2124 by Brenda Durham  Body Position: position changed independently  Device Skin Pressure Protection: adhesive use limited  Intervention: Prevent Infection  Recent Flowsheet Documentation  Taken 6/19/2024 0128 by Brenda Durham  Infection Prevention:   hand hygiene promoted   rest/sleep promoted  Taken 6/18/2024 2124 by Brenda Durham  Infection Prevention:   hand hygiene promoted   rest/sleep promoted  Goal: Optimal Comfort and Wellbeing  Outcome: Progressing  Goal: Readiness for Transition of Care  Outcome: Progressing     Problem: Dysrhythmia  Goal: Normalized Cardiac Rhythm  Outcome: Progressing     Problem: Skin Injury Risk Increased  Goal: Skin Health and Integrity  Outcome: Progressing  Intervention: Plan: Nurse Driven Intervention: Moisture Management  Recent Flowsheet Documentation  Taken 6/19/2024 0128 by Brenda Durham  Moisture Interventions: Encourage regular toileting  Taken 6/18/2024 2124 by Brenda Durham  Moisture Interventions: Encourage regular " toileting  Intervention: Plan: Nurse Driven Intervention: Friction and Shear  Recent Flowsheet Documentation  Taken 6/19/2024 0128 by Brenda Durham  Friction/Shear Interventions: HOB 30 degrees or less  Taken 6/18/2024 2124 by Brenda Durham  Friction/Shear Interventions: HOB 30 degrees or less  Intervention: Optimize Skin Protection  Recent Flowsheet Documentation  Taken 6/19/2024 0128 by Brenda Durham  Pressure Reduction Techniques: weight shift assistance provided  Activity Management: activity adjusted per tolerance  Head of Bed (HOB) Positioning: HOB at 30 degrees  Taken 6/18/2024 2124 by Brenda Durham  Pressure Reduction Techniques: weight shift assistance provided  Activity Management: activity adjusted per tolerance  Head of Bed (HOB) Positioning: HOB at 30-45 degrees     Problem: Heart Failure  Goal: Optimal Coping  Outcome: Progressing  Intervention: Support Psychosocial Response  Recent Flowsheet Documentation  Taken 6/19/2024 0128 by Brenda Durham  Supportive Measures:   active listening utilized   relaxation techniques promoted  Taken 6/18/2024 2124 by Brenda Durham  Supportive Measures:   active listening utilized   relaxation techniques promoted  Goal: Optimal Cardiac Output  Outcome: Progressing  Intervention: Optimize Cardiac Output  Recent Flowsheet Documentation  Taken 6/19/2024 0128 by Brenda Durham  Environmental Support:   personal routine supported   calm environment promoted  Taken 6/18/2024 2124 by Brenda Durham  Environmental Support:   personal routine supported   calm environment promoted  Goal: Stable Heart Rate and Rhythm  Outcome: Progressing  Goal: Optimal Functional Ability  Outcome: Progressing  Intervention: Optimize Functional Ability  Recent Flowsheet Documentation  Taken 6/19/2024 0128 by Brenda Durham  Activity Management: activity adjusted per tolerance  Taken 6/18/2024 2124 by Brenda Durham  Activity Management: activity  adjusted per tolerance  Goal: Fluid and Electrolyte Balance  Outcome: Progressing  Goal: Improved Oral Intake  Outcome: Progressing  Goal: Effective Oxygenation and Ventilation  Outcome: Progressing  Intervention: Promote Airway Secretion Clearance  Recent Flowsheet Documentation  Taken 6/19/2024 0128 by Brenda Durham  Cough And Deep Breathing: done independently per patient  Activity Management: activity adjusted per tolerance  Taken 6/18/2024 2124 by Brenda Durham  Cough And Deep Breathing: done independently per patient  Activity Management: activity adjusted per tolerance  Intervention: Optimize Oxygenation and Ventilation  Recent Flowsheet Documentation  Taken 6/19/2024 0128 by Brenda Durham  Head of Bed (HOB) Positioning: HOB at 30 degrees  Taken 6/18/2024 2124 by Brenda Durham  Head of Bed (HOB) Positioning: HOB at 30-45 degrees  Goal: Effective Breathing Pattern During Sleep  Outcome: Progressing  Intervention: Monitor and Manage Obstructive Sleep Apnea  Recent Flowsheet Documentation  Taken 6/19/2024 0128 by Brenda Durham  Medication Review/Management: medications reviewed  Taken 6/18/2024 2124 by Brenda Durham  Medication Review/Management: medications reviewed     Problem: Comorbidity Management  Goal: Blood Pressure in Desired Range  Outcome: Progressing  Intervention: Maintain Blood Pressure Management  Recent Flowsheet Documentation  Taken 6/19/2024 0128 by Brenda Durham  Medication Review/Management: medications reviewed  Taken 6/18/2024 2124 by Brenda Durham  Medication Review/Management: medications reviewed     Problem: Fall Injury Risk  Goal: Absence of Fall and Fall-Related Injury  Outcome: Progressing  Intervention: Identify and Manage Contributors  Recent Flowsheet Documentation  Taken 6/19/2024 0128 by Brenda Durham  Medication Review/Management: medications reviewed  Taken 6/18/2024 2124 by Brenda Durham  Medication Review/Management:  medications reviewed  Intervention: Promote Injury-Free Environment  Recent Flowsheet Documentation  Taken 6/19/2024 0128 by Brenda Durham  Safety Promotion/Fall Prevention:   assistive device/personal items within reach   clutter free environment maintained   nonskid shoes/slippers when out of bed   safety round/check completed  Taken 6/18/2024 2124 by Brenda Durham  Safety Promotion/Fall Prevention:   assistive device/personal items within reach   clutter free environment maintained   nonskid shoes/slippers when out of bed   safety round/check completed     Outcome Evaluation: Denies any dizziness, pt's denies any pain, able to make her needs be known. Plan TCU vs home.

## 2024-06-19 NOTE — PROGRESS NOTES
Care Management Discharge Note    Discharge Date: 06/20/2024     Discharge Disposition:  Home    Discharge Services:  HC PT    Discharge DME:      Discharge Transportation:  spouse    Private pay costs discussed: Not applicable    Education Provided on the Discharge Plan:  yes  Persons Notified of Discharge Plans: pt, MD, nursing  Patient/Family in Agreement with the Plan:  yes    Handoff Referral Completed: No    Additional Information:  SW following for discharge planning.     Per chart, pt declined CD services/assessment.     Noted PT recommending TCU. Met with pt this date and introduced self and social work role. Pt declines TCU recommendation. Pt agreeable to home PT and does not have preference for agency. Pt would like to discharge home today. Spouse will transport.     Referral sent to  [facility] for home PT. Paged MD with update.     ADDENDUM @ 2507:     Pt has been accepted by [facility] for PT. CARMENCITA followed up with pt per her request. Pt requested SW call her  as he wanted to speak with SW. Per pt, MD wants pt to stay one more night.     Call placed to Blas p:[phone]. Blas inquired discharge plan. CARMENCITA provided update on pt's refusal of TCU recommendation. Discussed home PT plan with [facility]. Blas is agreeable to plan. Blas plans to transport pt home tomorrow at 1300.     Updated pt.     Social work will continue to follow and assist with discharge planning as needed.    FAB Patel, LSW  Care Coordination    [facility]

## 2024-06-19 NOTE — PROGRESS NOTES
[facility]    Medicine Progress Note - Hospitalist Service    Date of Admission:  6/15/2024    Assessment & Plan     Starvation Ketosis, dehydration  Hypokalemia  Hypomagnesemia  Hypophosphatemia   Mild cognitive disorder exacerbated by alcohol use  Likely related to decreased PO intake, alcohol use, likely refeeding.   -Nutrition consulted  -RN managed replacement protocols  -high dose thiamine, folate, likely discontinue IV tomorrow    Alcohol use disorder  Alcohol gastritis  Possible pancreatitis exacerbation  Daily use, no hx withdrawal per patient. She has been cutting down recently, but continues to struggle with cessation. Rizwana reports 2x weekly alcohol use, collateral from family with 2 alcoholic drinks with dinner, followed by daniel until she falls asleep nightly.  She has previously done well living in more controlled environments where her access to alcohol is limited; unfortunately at home she is insistent on having a drink, to the point of ordering alcohol to the house despite family interventions.  -CIWA protocol discontinued 6/18, continue gabapentin taper   -lyte and nutrition as above  -pantoprazole  -addiction medicine consult placed given severity, high risk comorbidities, previously sober in structured living environment    SVT  Atrial fibrillation with RVR  S/p Watchman device placement 7/2022   On presenting EKG in ER, converted to atrial fibrillation after 6 mg IV adenosine. Was on diltiazem gtt, lopressor started with plan to wean off dilt and consolidate metoprolol.  -diltiazem gtt with goal HR <100, stop when HR goal  -lopressor 25 mg q6 h , will consolidate when HR controlled  -TTE with EF 60-65%; does note significant LA dilation, watchman device seen prominently in LA. Discussed with cardiology on call, Dr. Jain, will further characterize with IAIN in AM. IAIN completed, device well seated.           Diet: Regular Diet Adult  Snacks/Supplements Adult: Ensure  Enlive; Between Meals    DVT Prophylaxis: Pneumatic Compression Devices  Billings Catheter: Not present  Lines: None     Cardiac Monitoring: None  Code Status: Full Code      Clinically Significant Risk Factors            # Hypomagnesemia: Lowest Mg = 1.3 mg/dL in last 2 days, will replace as needed                       # Moderate Malnutrition: based on nutrition assessment, PRESENT ON ADMISSION          Disposition Plan      Expected Discharge Date: 06/20/2024        Discharge Comments: will need placement          Staci Cruz DO  Hospitalist Service  [facility]  Securely message with [employer] (more info)  Text page via [employer] Paging/Directory   ______________________________________________________________________      Physical Exam   Vital Signs: Temp: 98.1  F (36.7  C) Temp src: Oral BP: 103/63 Pulse: 77   Resp: 20 SpO2: 98 % O2 Device: None (Room air)    Weight: 123 lbs 14.38 oz    General: Very pleasant female , NAD  HEENT:  Sclerae anicteric.  Mucous membranes moist.  Cardiac: rate controlled.  Respiratory: Normal work of breathing.    GI:  Abdomen soft, nontender, nondistended.  Neurologic: Alert. No tremors  Psychologic: Appropriate mood and affect.      Medical Decision Making       45 MINUTES SPENT BY ME on the date of service doing chart review, history, exam, documentation & further activities per the note.        Data     I have personally reviewed the following data over the past 24 hrs:    N/A  \   N/A   / N/A     N/A N/A N/A /  N/A   4.4 N/A N/A \       Imaging results reviewed over the past 24 hrs:   No results found for this or any previous visit (from the past 24 hour(s)).

## 2024-06-20 VITALS
OXYGEN SATURATION: 98 % | HEART RATE: 76 BPM | RESPIRATION RATE: 20 BRPM | WEIGHT: 123.9 LBS | BODY MASS INDEX: 20 KG/M2 | SYSTOLIC BLOOD PRESSURE: 115 MMHG | TEMPERATURE: 98 F | DIASTOLIC BLOOD PRESSURE: 68 MMHG

## 2024-06-20 LAB
ALBUMIN SERPL BCG-MCNC: 3 G/DL (ref 3.5–5.2)
ALP SERPL-CCNC: 90 U/L (ref 40–150)
ALT SERPL W P-5'-P-CCNC: 30 U/L (ref 0–50)
ANION GAP SERPL CALCULATED.3IONS-SCNC: 12 MMOL/L (ref 7–15)
AST SERPL W P-5'-P-CCNC: 40 U/L (ref 0–45)
BACTERIA BLD CULT: NO GROWTH
BACTERIA BLD CULT: NO GROWTH
BASOPHILS # BLD AUTO: 0.1 10E3/UL (ref 0–0.2)
BASOPHILS NFR BLD AUTO: 1 %
BILIRUB SERPL-MCNC: 0.2 MG/DL
BUN SERPL-MCNC: 9.2 MG/DL (ref 8–23)
CALCIUM SERPL-MCNC: 8.9 MG/DL (ref 8.8–10.2)
CHLORIDE SERPL-SCNC: 103 MMOL/L (ref 98–107)
CREAT SERPL-MCNC: 0.59 MG/DL (ref 0.51–0.95)
DEPRECATED HCO3 PLAS-SCNC: 23 MMOL/L (ref 22–29)
EGFRCR SERPLBLD CKD-EPI 2021: >90 ML/MIN/1.73M2
EOSINOPHIL # BLD AUTO: 0.1 10E3/UL (ref 0–0.7)
EOSINOPHIL NFR BLD AUTO: 3 %
ERYTHROCYTE [DISTWIDTH] IN BLOOD BY AUTOMATED COUNT: 14.2 % (ref 10–15)
GLUCOSE SERPL-MCNC: 114 MG/DL (ref 70–99)
HCT VFR BLD AUTO: 36.2 % (ref 35–47)
HGB BLD-MCNC: 12 G/DL (ref 11.7–15.7)
IMM GRANULOCYTES # BLD: 0 10E3/UL
IMM GRANULOCYTES NFR BLD: 1 %
LYMPHOCYTES # BLD AUTO: 1 10E3/UL (ref 0.8–5.3)
LYMPHOCYTES NFR BLD AUTO: 19 %
MAGNESIUM SERPL-MCNC: 1.8 MG/DL (ref 1.7–2.3)
MCH RBC QN AUTO: 34.8 PG (ref 26.5–33)
MCHC RBC AUTO-ENTMCNC: 33.1 G/DL (ref 31.5–36.5)
MCV RBC AUTO: 105 FL (ref 78–100)
MONOCYTES # BLD AUTO: 0.7 10E3/UL (ref 0–1.3)
MONOCYTES NFR BLD AUTO: 14 %
NEUTROPHILS # BLD AUTO: 3.3 10E3/UL (ref 1.6–8.3)
NEUTROPHILS NFR BLD AUTO: 63 %
NRBC # BLD AUTO: 0 10E3/UL
NRBC BLD AUTO-RTO: 0 /100
PHOSPHATE SERPL-MCNC: 4.4 MG/DL (ref 2.5–4.5)
PLATELET # BLD AUTO: 170 10E3/UL (ref 150–450)
POTASSIUM SERPL-SCNC: 4 MMOL/L (ref 3.4–5.3)
PROT SERPL-MCNC: 5.6 G/DL (ref 6.4–8.3)
RBC # BLD AUTO: 3.45 10E6/UL (ref 3.8–5.2)
SODIUM SERPL-SCNC: 138 MMOL/L (ref 135–145)
WBC # BLD AUTO: 5.2 10E3/UL (ref 4–11)

## 2024-06-20 PROCEDURE — 80053 COMPREHEN METABOLIC PANEL: CPT | Performed by: INTERNAL MEDICINE

## 2024-06-20 PROCEDURE — 84100 ASSAY OF PHOSPHORUS: CPT | Performed by: INTERNAL MEDICINE

## 2024-06-20 PROCEDURE — 250N000013 HC RX MED GY IP 250 OP 250 PS 637: Performed by: STUDENT IN AN ORGANIZED HEALTH CARE EDUCATION/TRAINING PROGRAM

## 2024-06-20 PROCEDURE — 36415 COLL VENOUS BLD VENIPUNCTURE: CPT | Performed by: INTERNAL MEDICINE

## 2024-06-20 PROCEDURE — 83735 ASSAY OF MAGNESIUM: CPT | Performed by: INTERNAL MEDICINE

## 2024-06-20 PROCEDURE — 99239 HOSP IP/OBS DSCHRG MGMT >30: CPT | Performed by: INTERNAL MEDICINE

## 2024-06-20 PROCEDURE — 85004 AUTOMATED DIFF WBC COUNT: CPT | Performed by: INTERNAL MEDICINE

## 2024-06-20 PROCEDURE — 250N000013 HC RX MED GY IP 250 OP 250 PS 637: Performed by: INTERNAL MEDICINE

## 2024-06-20 PROCEDURE — 250N000011 HC RX IP 250 OP 636: Performed by: STUDENT IN AN ORGANIZED HEALTH CARE EDUCATION/TRAINING PROGRAM

## 2024-06-20 RX ORDER — LANOLIN ALCOHOL/MO/W.PET/CERES
100 CREAM (GRAM) TOPICAL DAILY
Qty: 30 TABLET | Refills: 0 | Status: SHIPPED | OUTPATIENT
Start: 2024-06-24

## 2024-06-20 RX ORDER — MULTIPLE VITAMINS W/ MINERALS TAB 9MG-400MCG
1 TAB ORAL DAILY
COMMUNITY
Start: 2024-06-20

## 2024-06-20 RX ADMIN — METOPROLOL SUCCINATE 50 MG: 50 TABLET, EXTENDED RELEASE ORAL at 08:26

## 2024-06-20 RX ADMIN — Medication 1 TABLET: at 08:26

## 2024-06-20 RX ADMIN — GABAPENTIN 600 MG: 300 CAPSULE ORAL at 12:35

## 2024-06-20 RX ADMIN — PANTOPRAZOLE SODIUM 40 MG: 40 TABLET, DELAYED RELEASE ORAL at 08:26

## 2024-06-20 RX ADMIN — GABAPENTIN 600 MG: 300 CAPSULE ORAL at 04:09

## 2024-06-20 RX ADMIN — FOLIC ACID 1 MG: 1 TABLET ORAL at 08:26

## 2024-06-20 RX ADMIN — THIAMINE HYDROCHLORIDE 250 MG: 100 INJECTION, SOLUTION INTRAMUSCULAR; INTRAVENOUS at 08:29

## 2024-06-20 ASSESSMENT — ACTIVITIES OF DAILY LIVING (ADL)
ADLS_ACUITY_SCORE: 35

## 2024-06-20 NOTE — DISCHARGE SUMMARY
[facility]  Hospitalist Discharge Summary      Date of Admission:  6/15/2024  Date of Discharge:  6/20/2024  Discharging Provider: Erwin Pulido DO  Discharge Service: Hospitalist Service    Discharge Diagnoses   Atrial fibrillation with rapid ventricular response.  Previous watchman's device placement.  Alcohol use disorder.  Acute alcohol withdrawals.  Hypokalemia.  Hypomagnesemia.  Hypophosphatemia.  Moderate malnutrition.  Starvation ketosis.  GERD.  Deconditioning.  Previous mechanical falls.    Clinically Significant Risk Factors     # Moderate Malnutrition: based on nutrition assessment      Follow-ups Needed After Discharge   Follow-up Appointments     Follow-up and recommended labs and tests       Follow up with primary care provider, Kenyatta Slaughter, within 7 days   for hospital follow- up.  The following labs/tests are recommended: CBC   and BMP within 7 days.            Discharge Disposition   Discharged to home  Condition at discharge: Stable    Hospital Course   Ms. Aguilar is a 73 year old female with PMH significant for paroxysymal afib s/p Watchman's device and not on anticoagulation, HTN, alcohol use disorder with daily alcohol use, alcoholic liver disease, pancreatitis, breast cancer, memory loss, erosive gastropathy who presented to the ER today for concerns about dizziness and difficult time getting up/weakness.  No loss of consciousness.  She endorses poor oral intake recently.  Found to have a supraventricular tachycardia with multiple electrolyte abnormalities.  She initially had a dose of adenosine.  Found to clearly have atrial fibrillation with rapid ventricular response.  Started on continuous IV diltiazem infusion.  Heart rate has returned to much better control.  Continue metoprolol succinate and diltiazem.  She does have a previous watchman's device placement.  Long history of alcohol use disorder.  She did go through acute alcohol withdrawals during  hospital stay.  No longer in any significant withdrawals by time of discharge.  She was seen in consultation by therapy during hospital stay.  It was recommended that she would benefit from a transitional care unit for continued intense work with therapy.  Patient is refusing TCU placement at time of discharge.  She is going to discharge home with home physical and Occupational Therapy to work with her to build strength.  She does need long-term alcohol cessation.  I have discussed need for long-term alcohol cessation with patient on 6/19 and again on 6/20/2024.  She should follow-up with her primary care provider within 1 week.    Consultations This Hospital Stay   ADDICTION MEDICINE INPATIENT CONSULT  NUTRITION SERVICES ADULT IP CONSULT  CARE MANAGEMENT / SOCIAL WORK IP CONSULT  PSYCHIATRY IP CONSULT  PHYSICAL THERAPY ADULT IP CONSULT  CHEMICAL DEPENDENCY IP CONSULT    Code Status   Full Code    Time Spent on this Encounter   I spent 40 minutes with Ms. Aguilar and working on discharge on 6/20/2024.       Erwin Pulido [place] [address] [facility]  [address] [place] [address]  Phone: [phone]  Fax: [phone]  ______________________________________________________________________    Physical Exam   Vital Signs: Temp: 98  F (36.7  C) Temp src: Oral BP: 115/68 Pulse: 76   Resp: 20 SpO2: 98 % O2 Device: None (Room air)    Weight: 123 lbs 14.38 oz  Gen:  NAD, A&Ox3.  Eyes:  PERRL, sclera anicteric.  OP:  MMM, no lesions.  Neck:  Supple.  CV:  Regular, no murmurs.  Lung:  CTA b/l, normal effort.  Ab:  +BS, soft.  Skin:  Warm, dry to touch.  No rash.  Ext:  No pitting edema LE b/l.         Primary Care Physician   Kenyatta Slaughter    Discharge Orders      CBC with platelets     Basic metabolic panel     Home Care Referral      Reason for your hospital stay    Atrial fibrillation with rapid ventricular response, alcohol withdrawals.     Follow-up and recommended labs and  tests     Follow up with primary care provider, Kenyatta Slaughter, within 7 days for hospital follow- up.  The following labs/tests are recommended: CBC and BMP within 7 days.     Activity    Your activity upon discharge: activity as tolerated     Diet    Follow this diet upon discharge: Regular           Discharge Medications   Current Discharge Medication List        START taking these medications    Details   thiamine (B-1) 100 MG tablet Take 1 tablet (100 mg) by mouth daily  Qty: 30 tablet, Refills: 0    Associated Diagnoses: Alcohol withdrawal syndrome without complication (H)           CONTINUE these medications which have CHANGED    Details   multivitamin w/minerals (THERA-VIT-M) tablet Take 1 tablet by mouth daily    Associated Diagnoses: Alcohol withdrawal syndrome without complication (H)           CONTINUE these medications which have NOT CHANGED    Details   aspirin 81 MG EC tablet Take 81 mg by mouth daily      diltiazem ER (DILT-XR) 120 MG 24 hr capsule Take 120 mg by mouth daily      folic acid (FOLVITE) 1 MG tablet Take 1 tablet (1 mg) by mouth daily    Associated Diagnoses: Closed nondisplaced fracture of acromial end of left clavicle, initial encounter; Alcohol use disorder      magnesium oxide (MAG-OX) 400 MG tablet Take 400 mg by mouth daily      meclizine 25 MG CHEW Take 50 mg by mouth 2 times daily as needed for dizziness      melatonin 5 MG tablet Take 5-10 mg by mouth nightly as needed for sleep      metoprolol succinate ER (TOPROL XL) 50 MG 24 hr tablet Take 50 mg by mouth daily      omeprazole (PRILOSEC) 20 MG DR capsule Take 20 mg by mouth 2 times daily      vitamin B-Complex Take 1 tablet by mouth daily           Allergies   Allergies   Allergen Reactions    Penicillins Unknown

## 2024-06-20 NOTE — PLAN OF CARE
"Goal Outcome Evaluation:      Plan of Care Reviewed With: patient    Overall Patient Progress: improvingOverall Patient Progress: improving    Outcome Evaluation: Plan to discharge to home possibly today.        Problem: Adult Inpatient Plan of Care  Goal: Plan of Care Review  Description: The Plan of Care Review/Shift note should be completed every shift.  The Outcome Evaluation is a brief statement about your assessment that the patient is improving, declining, or no change.  This information will be displayed automatically on your shift  note.  Outcome: Progressing  Flowsheets (Taken 6/20/2024 0517)  Outcome Evaluation: Plan to discharge to home possibly today.  Plan of Care Reviewed With: patient  Overall Patient Progress: improving  Goal: Patient-Specific Goal (Individualized)  Description: You can add care plan individualizations to a care plan. Examples of Individualization might be:  \"Parent requests to be called daily at 9am for status\", \"I have a hard time hearing out of my right ear\", or \"Do not touch me to wake me up as it startles  me\".  Outcome: Progressing  Goal: Absence of Hospital-Acquired Illness or Injury  Outcome: Progressing  Intervention: Prevent Skin Injury  Recent Flowsheet Documentation  Taken 6/20/2024 0050 by Neil Tan, RN  Body Position: position changed independently  Intervention: Prevent Infection  Recent Flowsheet Documentation  Taken 6/20/2024 0050 by Neil Tan RN  Infection Prevention:   hand hygiene promoted   rest/sleep promoted  Goal: Optimal Comfort and Wellbeing  Outcome: Progressing  Goal: Readiness for Transition of Care  Outcome: Progressing     Problem: Dysrhythmia  Goal: Normalized Cardiac Rhythm  Outcome: Progressing     Problem: Skin Injury Risk Increased  Goal: Skin Health and Integrity  Outcome: Progressing  Intervention: Optimize Skin Protection  Recent Flowsheet Documentation  Taken 6/20/2024 0050 by Neil Tan, RN  Activity Management:   ambulated to " bathroom   back to bed  Head of Bed (HOB) Positioning: HOB at 20-30 degrees     Problem: Heart Failure  Goal: Optimal Coping  Outcome: Progressing  Intervention: Support Psychosocial Response  Recent Flowsheet Documentation  Taken 6/20/2024 0050 by Neil Tan RN  Supportive Measures:   active listening utilized   relaxation techniques promoted  Goal: Optimal Cardiac Output  Outcome: Progressing  Intervention: Optimize Cardiac Output  Recent Flowsheet Documentation  Taken 6/20/2024 0050 by Neil Tan RN  Environmental Support:   personal routine supported   calm environment promoted  Goal: Stable Heart Rate and Rhythm  Outcome: Progressing  Goal: Optimal Functional Ability  Outcome: Progressing  Intervention: Optimize Functional Ability  Recent Flowsheet Documentation  Taken 6/20/2024 0050 by Neil Tan RN  Activity Management:   ambulated to bathroom   back to bed  Goal: Fluid and Electrolyte Balance  Outcome: Progressing  Goal: Improved Oral Intake  Outcome: Progressing  Goal: Effective Oxygenation and Ventilation  Outcome: Progressing  Intervention: Promote Airway Secretion Clearance  Recent Flowsheet Documentation  Taken 6/20/2024 0050 by Neil Tan RN  Activity Management:   ambulated to bathroom   back to bed  Intervention: Optimize Oxygenation and Ventilation  Recent Flowsheet Documentation  Taken 6/20/2024 0050 by Neil Tan RN  Head of Bed (HOB) Positioning: HOB at 20-30 degrees  Goal: Effective Breathing Pattern During Sleep  Outcome: Progressing     Problem: Comorbidity Management  Goal: Blood Pressure in Desired Range  Outcome: Progressing     Problem: Fall Injury Risk  Goal: Absence of Fall and Fall-Related Injury  Outcome: Progressing

## 2024-06-20 NOTE — PLAN OF CARE
Up ax1. Vss on room air. Tolerating regular diet. Reviewed discharge instructions with patient. Questions answered. Patient discharged to home with  ,,discharge meds, discharge instructions, and belongings.

## 2024-06-20 NOTE — PLAN OF CARE
"VSS, denies pain, A1 cane gaitbelt, regular diet. AO x4 forgetful, RA WOOD, apical pulse regular. Incontinent of bowel and bladder, K4.0 Mg2.3 and P3.4 AM draws. Chem Dep consulted today (see note), plan to discharge home tomorrow.      Goal Outcome Evaluation:    Plan of Care Reviewed With: patient  Overall Patient Progress: improvingOverall Patient Progress: improving  Outcome Evaluation: medically stable for discharge as soon as tomorrow AM    Problem: Adult Inpatient Plan of Care  Goal: Plan of Care Review  Description: The Plan of Care Review/Shift note should be completed every shift.  The Outcome Evaluation is a brief statement about your assessment that the patient is improving, declining, or no change.  This information will be displayed automatically on your shift  note.  Outcome: Progressing  Flowsheets (Taken 6/19/2024 1948)  Outcome Evaluation: medically stable for discharge as soon as tomorrow AM  Plan of Care Reviewed With: patient  Overall Patient Progress: improving  Goal: Patient-Specific Goal (Individualized)  Description: You can add care plan individualizations to a care plan. Examples of Individualization might be:  \"Parent requests to be called daily at 9am for status\", \"I have a hard time hearing out of my right ear\", or \"Do not touch me to wake me up as it startles  me\".  Outcome: Progressing  Goal: Absence of Hospital-Acquired Illness or Injury  Outcome: Progressing  Intervention: Identify and Manage Fall Risk  Recent Flowsheet Documentation  Taken 6/19/2024 1700 by Bebe Grayson RN  Safety Promotion/Fall Prevention: safety round/check completed  Goal: Optimal Comfort and Wellbeing  Outcome: Progressing  Goal: Readiness for Transition of Care  Outcome: Progressing     Problem: Dysrhythmia  Goal: Normalized Cardiac Rhythm  Outcome: Progressing     Problem: Skin Injury Risk Increased  Goal: Skin Health and Integrity  Outcome: Progressing  Intervention: Plan: Nurse Driven Intervention: Moisture " Management  Recent Flowsheet Documentation  Taken 6/19/2024 1700 by Bebe Grayson, RN  Moisture Interventions: Encourage regular toileting  Bathing/Skin Care:   incontinence care   dressed/undressed  Intervention: Optimize Skin Protection  Recent Flowsheet Documentation  Taken 6/19/2024 1700 by Bebe Grayson RN  Activity Management:   activity adjusted per tolerance   ambulated to bathroom   back to bed     Problem: Heart Failure  Goal: Optimal Coping  Outcome: Progressing  Goal: Optimal Cardiac Output  Outcome: Progressing  Goal: Stable Heart Rate and Rhythm  Outcome: Progressing  Goal: Optimal Functional Ability  Outcome: Progressing  Intervention: Optimize Functional Ability  Recent Flowsheet Documentation  Taken 6/19/2024 1700 by Bebe Grayson RN  Activity Management:   activity adjusted per tolerance   ambulated to bathroom   back to bed  Goal: Fluid and Electrolyte Balance  Outcome: Progressing  Goal: Improved Oral Intake  Outcome: Progressing  Goal: Effective Oxygenation and Ventilation  Outcome: Progressing  Intervention: Promote Airway Secretion Clearance  Recent Flowsheet Documentation  Taken 6/19/2024 1700 by Bebe Grayson RN  Activity Management:   activity adjusted per tolerance   ambulated to bathroom   back to bed  Goal: Effective Breathing Pattern During Sleep  Outcome: Progressing     Problem: Comorbidity Management  Goal: Blood Pressure in Desired Range  Outcome: Progressing     Problem: Fall Injury Risk  Goal: Absence of Fall and Fall-Related Injury  Outcome: Progressing  Intervention: Promote Injury-Free Environment  Recent Flowsheet Documentation  Taken 6/19/2024 1700 by Bebe Grayson RN  Safety Promotion/Fall Prevention: safety round/check completed

## 2024-06-22 ENCOUNTER — PATIENT OUTREACH (OUTPATIENT)
Dept: CARE COORDINATION | Facility: CLINIC | Age: 74
End: 2024-06-22
Payer: COMMERCIAL

## 2024-06-22 NOTE — PROGRESS NOTES
Connected Care Resource Center:   [facility] Contact  [facility]/Voicemail     Clinical Data: Post-Discharge Outreach     Outreach attempted x 2.  Left message on patient's voicemail, providing [facility]'s central phone number of [phone] ([phone]) for questions/concerns and/or to schedule an appt with an [facility] provider, if they do not have a PCP.      Plan:  [facility] will do no further outreaches at this time.       Indu Moreno MA  Connected Care Resource Center, [facility]    *Connected Care Resource Team does NOT follow patient ongoing. Referrals are identified based on internal discharge reports and the outreach is to ensure patient has an understanding of their discharge instructions.

## 2024-10-03 ENCOUNTER — HOSPITAL ENCOUNTER (EMERGENCY)
Facility: CLINIC | Age: 74
Discharge: HOME | End: 2024-10-03
Attending: EMERGENCY MEDICINE | Admitting: EMERGENCY MEDICINE
Payer: COMMERCIAL

## 2024-10-03 ENCOUNTER — APPOINTMENT (OUTPATIENT)
Dept: CT IMAGING | Facility: CLINIC | Age: 74
End: 2024-10-03
Attending: EMERGENCY MEDICINE
Payer: COMMERCIAL

## 2024-10-03 VITALS
TEMPERATURE: 98.1 F | RESPIRATION RATE: 20 BRPM | HEART RATE: 79 BPM | SYSTOLIC BLOOD PRESSURE: 130 MMHG | OXYGEN SATURATION: 98 % | DIASTOLIC BLOOD PRESSURE: 78 MMHG

## 2024-10-03 DIAGNOSIS — S00.93XA TRAUMATIC CEPHALOHEMATOMA, INITIAL ENCOUNTER: ICD-10-CM

## 2024-10-03 DIAGNOSIS — S00.81XA ABRASION OF FOREHEAD, INITIAL ENCOUNTER: ICD-10-CM

## 2024-10-03 DIAGNOSIS — F10.920 ALCOHOLIC INTOXICATION WITHOUT COMPLICATION (H): ICD-10-CM

## 2024-10-03 LAB
ALBUMIN SERPL BCG-MCNC: 5.2 G/DL (ref 3.5–5.2)
ALP SERPL-CCNC: 109 U/L (ref 40–150)
ALT SERPL W P-5'-P-CCNC: 61 U/L (ref 0–50)
ANION GAP SERPL CALCULATED.3IONS-SCNC: 24 MMOL/L (ref 7–15)
AST SERPL W P-5'-P-CCNC: 163 U/L (ref 0–45)
BASOPHILS # BLD AUTO: 0.1 10E3/UL (ref 0–0.2)
BASOPHILS NFR BLD AUTO: 2 %
BILIRUB SERPL-MCNC: 0.4 MG/DL
BUN SERPL-MCNC: 4.8 MG/DL (ref 8–23)
CALCIUM SERPL-MCNC: 10.5 MG/DL (ref 8.8–10.4)
CHLORIDE SERPL-SCNC: 92 MMOL/L (ref 98–107)
CREAT SERPL-MCNC: 0.6 MG/DL (ref 0.51–0.95)
EGFRCR SERPLBLD CKD-EPI 2021: >90 ML/MIN/1.73M2
EOSINOPHIL # BLD AUTO: 0.1 10E3/UL (ref 0–0.7)
EOSINOPHIL NFR BLD AUTO: 1 %
ERYTHROCYTE [DISTWIDTH] IN BLOOD BY AUTOMATED COUNT: 12.8 % (ref 10–15)
ETHANOL SERPL-MCNC: 0.29 G/DL
ETHANOL SERPL-MCNC: 0.3 G/DL
GLUCOSE SERPL-MCNC: 112 MG/DL (ref 70–99)
HCO3 SERPL-SCNC: 20 MMOL/L (ref 22–29)
HCT VFR BLD AUTO: 47.7 % (ref 35–47)
HGB BLD-MCNC: 16.6 G/DL (ref 11.7–15.7)
HOLD SPECIMEN: NORMAL
HOLD SPECIMEN: NORMAL
IMM GRANULOCYTES # BLD: 0.1 10E3/UL
IMM GRANULOCYTES NFR BLD: 1 %
LYMPHOCYTES # BLD AUTO: 1.7 10E3/UL (ref 0.8–5.3)
LYMPHOCYTES NFR BLD AUTO: 27 %
MCH RBC QN AUTO: 34.7 PG (ref 26.5–33)
MCHC RBC AUTO-ENTMCNC: 34.8 G/DL (ref 31.5–36.5)
MCV RBC AUTO: 100 FL (ref 78–100)
MONOCYTES # BLD AUTO: 0.5 10E3/UL (ref 0–1.3)
MONOCYTES NFR BLD AUTO: 7 %
NEUTROPHILS # BLD AUTO: 3.8 10E3/UL (ref 1.6–8.3)
NEUTROPHILS NFR BLD AUTO: 61 %
NRBC # BLD AUTO: 0 10E3/UL
NRBC BLD AUTO-RTO: 0 /100
PLATELET # BLD AUTO: 240 10E3/UL (ref 150–450)
POTASSIUM SERPL-SCNC: 3.3 MMOL/L (ref 3.4–5.3)
PROT SERPL-MCNC: 9.1 G/DL (ref 6.4–8.3)
RBC # BLD AUTO: 4.78 10E6/UL (ref 3.8–5.2)
SODIUM SERPL-SCNC: 136 MMOL/L (ref 135–145)
WBC # BLD AUTO: 6.2 10E3/UL (ref 4–11)

## 2024-10-03 PROCEDURE — 85004 AUTOMATED DIFF WBC COUNT: CPT | Performed by: EMERGENCY MEDICINE

## 2024-10-03 PROCEDURE — 80053 COMPREHEN METABOLIC PANEL: CPT | Performed by: EMERGENCY MEDICINE

## 2024-10-03 PROCEDURE — 82077 ASSAY SPEC XCP UR&BREATH IA: CPT | Performed by: EMERGENCY MEDICINE

## 2024-10-03 PROCEDURE — 70450 CT HEAD/BRAIN W/O DYE: CPT

## 2024-10-03 PROCEDURE — 99285 EMERGENCY DEPT VISIT HI MDM: CPT | Mod: 25

## 2024-10-03 PROCEDURE — 36415 COLL VENOUS BLD VENIPUNCTURE: CPT | Performed by: EMERGENCY MEDICINE

## 2024-10-03 PROCEDURE — 70486 CT MAXILLOFACIAL W/O DYE: CPT

## 2024-10-03 PROCEDURE — 72125 CT NECK SPINE W/O DYE: CPT

## 2024-10-03 ASSESSMENT — ACTIVITIES OF DAILY LIVING (ADL)
ADLS_ACUITY_SCORE: 38

## 2024-10-03 NOTE — ED PROVIDER NOTES
I briefly evaluated the patient prior to arrival of oncoming ED physician.  This is a 74-year-old female who is currently intoxicated who had a witnessed fall.  She reports head trauma and hematoma from the left orbit.  Patient is overtly intoxicated.  Patient will require CT scan of her head, facial bones and cervical spine.       Arya Jain MD  10/03/24 2232

## 2024-10-03 NOTE — ED PROVIDER NOTES
"  Emergency Department Note      History of Present Illness     Chief Complaint   Fall and Alcohol Intoxication    History limited due to clinical intoxication.    HPI   Rizwana Aguilar is a 74 year old female with a history as noted below who presents to the emergency department for a fall and ETOH intoxication. EMS states that tonight, the patient was at home with her daughter when the daughter left for 5 minutes, reporting that when she returned inside, the patient was found on the floor. They report that the patient his the left side of her head on some drywall with active bleeding. Patient also has swelling around her left eye. Denies loss of consciousness. No blood thinners. Patient blew a 0.317 per EMS. They noticed 3/4 a bottle of wine empty (approximately a 750 mL bottle). They add that the patient had a \"teaspoon of daniel this morning. SBP was 150's and the patient was tachycardic at 109 bpm. O2 saturations on room air was 98%.    The patient states that she fell two days ago walking up some stairs. She reports that she drinks nightly and when asked if she drank tonight, patient reports \"nope, there was none of that.\" She notes that she drank a \"half of a small glass\" of wine last night. Patient declines wanting sutures. Denies any pain or injuries. Denies neck pain or back pain. Denies pelvic or hip pain. Denies lower extremity pain. Denies taking blood thinners.    Patient's daughter states that she turned away for 1 moment and the patient fell on her face.  States patient has been drinking and they will be looking into outpatient treatment options.    Independent Historian   Daughter as detailed above.    Review of External Notes   None    Past Medical History     Medical History and Problem List   AFIB with RVR  Alcohol withdrawal  Alcoholic ketoacidosis   Acute pancreatitis  Hypertension  Hyperlipidemia  MDD  T-11-T12 fracture  Medications   aspirin 81 MG EC tablet  diltiazem ER (DILT-XR) 120 MG 24 hr " capsule  meclizine 25 MG CHEW  metoprolol succinate ER (TOPROL XL) 50 MG 24 hr tablet  omeprazole (PRILOSEC) 20 MG DR capsule    Surgical History   Ovary removal  Cataract removal, bilateral  Breast lumpectomy  IR lumbar puncture    Physical Exam     Patient Vitals for the past 24 hrs:   BP Temp Temp src Pulse Resp SpO2   10/03/24 1838 130/78 -- -- 79 -- 98 %   10/03/24 1835 130/78 98.1  F (36.7  C) Oral 75 20 98 %     Physical Exam  General: Laying on the ED bed  HEENT: Normocephalic, hematoma/swelling over the left eyebrow with a 1 cm linear laceration  Cardiac: Warm and well perfused, regular rate and rhythm  Pulm: Breathing comfortably, no accessory muscle usage, no conversational dyspnea, and lungs clear bilaterally  GI: Abdomen soft, nontender, no rigidity or guarding  MSK: C-spine nontender to palpation, no step-offs.  Pelvis stable.  Extremities x4 without bony deformity, no instability, tenderness to palpation, or painful range of motion.  Skin: Warm and dry  Neuro: Awake and alert, clinically intoxicated  Psych: Pleasant mood and affect      Diagnostics     Lab Results   Labs Ordered and Resulted from Time of ED Arrival to Time of ED Departure   COMPREHENSIVE METABOLIC PANEL - Abnormal       Result Value    Sodium 136      Potassium 3.3 (*)     Carbon Dioxide (CO2) 20 (*)     Anion Gap 24 (*)     Urea Nitrogen 4.8 (*)     Creatinine 0.60      GFR Estimate >90      Calcium 10.5 (*)     Chloride 92 (*)     Glucose 112 (*)     Alkaline Phosphatase 109       (*)     ALT 61 (*)     Protein Total 9.1 (*)     Albumin 5.2      Bilirubin Total 0.4     ETHYL ALCOHOL LEVEL - Abnormal    Alcohol ethyl 0.29 (*)    CBC WITH PLATELETS AND DIFFERENTIAL - Abnormal    WBC Count 6.2      RBC Count 4.78      Hemoglobin 16.6 (*)     Hematocrit 47.7 (*)           MCH 34.7 (*)     MCHC 34.8      RDW 12.8      Platelet Count 240      % Neutrophils 61      % Lymphocytes 27      % Monocytes 7      % Eosinophils 1       % Basophils 2      % Immature Granulocytes 1      NRBCs per 100 WBC 0      Absolute Neutrophils 3.8      Absolute Lymphocytes 1.7      Absolute Monocytes 0.5      Absolute Eosinophils 0.1      Absolute Basophils 0.1      Absolute Immature Granulocytes 0.1      Absolute NRBCs 0.0     ETHYL ALCOHOL LEVEL - Abnormal    Alcohol ethyl 0.30 (*)      Imaging   CT Facial Bones without Contrast   Final Result   IMPRESSION:    1.  No facial bone fracture or malalignment.         CT Cervical Spine w/o Contrast   Final Result   IMPRESSION:   1.  No fracture or posttraumatic subluxation.         Head CT w/o contrast   Final Result   IMPRESSION:   1.  No CT evidence for acute intracranial process.   2.  Brain atrophy and presumed chronic microvascular ischemic changes as above.        Independent Interpretation   CT Head: No intracranial hemorrhage.    ED Course      Medications Administered   Medications - No data to display    Discussion of Management   None    ED Course   ED Course as of 10/03/24 2027   Thu Oct 03, 2024   1826 I obtained history and examined the patient as noted above.      2019 I rechecked the patient. I discussed findings and discharge with the patient. All questions answered.        Additional Documentation  None    Medical Decision Making / Diagnosis     CMS Diagnoses: None    MIPS       None    MDM   Rizwana Aguilar is a 74 year old female who presents after a fall associated with alcohol intoxication.  Vital signs are stable.  Patient's exam is remarkable for a septal hematoma and small laceration over the left eyebrow.  The patient declines suture repair, states that the laceration could be as much is 2 days old which would make suturing contraindicated anyway.  CT of the head thankfully shows no ICH.  Facial bone CT shows no facial fractures.  CT cervical spine shows no fractures.  The patient's family arrived to the ED and was willing to care for at home while she hussein up which seems quite reasonable  given the reassuring imaging above.  Patient discharged in the care of her daughter.    Disposition   The patient was discharged.     Diagnosis     ICD-10-CM    1. Alcoholic intoxication without complication (H)  F10.920       2. Abrasion of forehead, initial encounter  S00.81XA       3. Traumatic cephalohematoma, initial encounter  S00.93XA          Scribe Disclosure:  Abdirashid PEREZ, am serving as a scribe at 6:21 PM on 10/3/2024 to document services personally performed by Sedrick Montero MD based on my observations and the provider's statements to me.        Sedrick Montero MD  10/03/24 2027

## 2024-10-03 NOTE — ED TRIAGE NOTES
BIBA; pt has a Hx on ETOH. Pt was at home with daughter and daughter stepped outside for 5 minutes. Daughter came back from outside and found pt on the floor. Pt hit left side of head on dry wall with active bleeding from site. Pt denies LOC. No blood thinners. EMS was called. Pt has swelling above left eye. Bleeding is controled. Pt is A&Ox4 but will repeat questions over. Pt blew a 0.317 (weak blow). EMS noticed 3/4 bottle of wine empty (750mL size). Pt also had a teaspoon of daniel this morning. Pt SBP 150s. Tachycardiac (109s). On RA o2 at 98%.